# Patient Record
Sex: FEMALE | Race: WHITE | Employment: FULL TIME | URBAN - METROPOLITAN AREA
[De-identification: names, ages, dates, MRNs, and addresses within clinical notes are randomized per-mention and may not be internally consistent; named-entity substitution may affect disease eponyms.]

---

## 2017-01-17 ENCOUNTER — OFFICE VISIT (OUTPATIENT)
Dept: ENDOCRINOLOGY | Age: 37
End: 2017-01-17

## 2017-01-17 VITALS
HEIGHT: 62 IN | HEART RATE: 106 BPM | WEIGHT: 293 LBS | DIASTOLIC BLOOD PRESSURE: 99 MMHG | SYSTOLIC BLOOD PRESSURE: 147 MMHG | BODY MASS INDEX: 53.92 KG/M2

## 2017-01-17 DIAGNOSIS — E11.9 TYPE 2 DIABETES MELLITUS WITHOUT COMPLICATION, WITH LONG-TERM CURRENT USE OF INSULIN (HCC): Primary | ICD-10-CM

## 2017-01-17 DIAGNOSIS — Z79.4 TYPE 2 DIABETES MELLITUS WITHOUT COMPLICATION, WITH LONG-TERM CURRENT USE OF INSULIN (HCC): Primary | ICD-10-CM

## 2017-01-17 DIAGNOSIS — I10 ESSENTIAL HYPERTENSION: ICD-10-CM

## 2017-01-17 RX ORDER — BLOOD SUGAR DIAGNOSTIC
STRIP MISCELLANEOUS
Qty: 100 STRIP | Refills: 10 | Status: SHIPPED | OUTPATIENT
Start: 2017-01-17 | End: 2019-01-30 | Stop reason: ALTCHOICE

## 2017-01-17 RX ORDER — PEN NEEDLE, DIABETIC 31 GX3/16"
NEEDLE, DISPOSABLE MISCELLANEOUS
Qty: 150 PEN NEEDLE | Refills: 11 | Status: SHIPPED | OUTPATIENT
Start: 2017-01-17

## 2017-01-17 NOTE — PATIENT INSTRUCTIONS
Diabetes type II  - Work on diet. Avoid sugared beverages and lower carbohydrate intake     Lantus 100 units daily (two 50 unit injections) -   - Take EVERY day    Novolog -   Continue current dosing for meals    Monitor glucoses before meals and at bedtime to help determine insulin adjustments  Goals for blood sugars:  Fasting  (less than 150)  Other times -  (less than 180).     Blood pressure:  Continue losartan 50 mg twice daily

## 2017-01-17 NOTE — MR AVS SNAPSHOT
Visit Information Date & Time Provider Department Dept. Phone Encounter #  
 1/17/2017 11:50 AM Sandra Juarez, Deborah [facility] Diabetes and Endocrinology [id number] Upcoming Health Maintenance Date Due Pneumococcal 19-64 Medium Risk (1 of 1 - PPSV23) 1/11/2001 DTaP/Tdap/Td series (1 - Tdap) 1/11/2003 PAP AKA CERVICAL CYTOLOGY 1/11/2003 HEMOGLOBIN A1C Q6M 9/16/2015 MICROALBUMIN Q1 3/16/2016 LIPID PANEL Q1 3/16/2016 EYE EXAM RETINAL OR DILATED Q1 5/12/2016 FOOT EXAM Q1 6/23/2016 INFLUENZA AGE 9 TO ADULT 8/1/2016 Allergies as of 1/17/2017  Review Complete On: 1/17/2017 By: Sandra Juarez MD  
  
 Severity Noted Reaction Type Reactions Celexa [Citalopram]  06/25/2012    Anxiety Current Immunizations  Never Reviewed No immunizations on file. Not reviewed this visit You Were Diagnosed With   
  
 Codes Comments Type 2 diabetes mellitus without complication, with long-term current use of insulin (HCC)    -  Primary ICD-10-CM: E11.9, Z79.4 ICD-9-CM: 250.00, V58.67 BMI 50.0-59.9, adult [facility])     ICD-10-CM: H66.07 
ICD-9-CM: V85.43 Essential hypertension     ICD-10-CM: I10 
ICD-9-CM: 401.9 Vitals BP Pulse Height(growth percentile) Weight(growth percentile) BMI OB Status (!) 147/99 (!) 106 5' 2\" (1.575 m) 302 lb (137 kg) 55.24 kg/m2 Having regular periods Smoking Status Never Smoker Vitals History BMI and BSA Data Body Mass Index Body Surface Area  
 55.24 kg/m 2 2.45 m 2 Preferred Pharmacy Pharmacy Name Phone [id number]- [place], [place] - [address] [phone] Your Updated Medication List  
  
   
This list is accurate as of: 1/17/17 12:54 PM.  Always use your most recent med list.  
  
  
  
  
 * Blood-Glucose Meter monitoring kit Commonly known as:  CommonBond IQ METER As directed * Blood-Glucose Meter Misc Commonly known as:  [address]  
1 Each by Does Not Apply route three (3) times daily. * glucose blood VI test strips strip Commonly known as:  ONETOUCH VERIO  
by Does Not Apply route five (5) times daily. * glucose blood VI test strips strip Commonly known as:  Edith Kidney Monitor blood glucose up to 3 times daily. insulin aspart 100 unit/mL Inpn Commonly known as:  Katerin Barnacle Inject 30-50 units with each meal and for high blood glucoses-up to 150 units/day. Follow-up needed for refills. Last visit > 1 year ago. Insulin Needles (Disposable) 32 gauge x 5/32\" Ndle Commonly known as:  Trina Pen Needle 5 times daily Insulin Syringe-Needle U-100 1 mL 31 gauge x 5/16 Syrg Commonly known as:  BD INSULIN SYRINGE ULT-FINE II  
1 Each by Does Not Apply route three (3) times daily. Lancets Misc Commonly known as: One Touch Delica  
by Does Not Apply route three (3) times daily. LANTUS SOLOSTAR 100 unit/mL (3 mL) pen Generic drug:  insulin glargine INJECT 100 UNITS (TWO 50 UNIT INJECTIONS) BY SUBCUTANEOUS ROUTE DAILY AS DIRECTED  
  
 losartan 50 mg tablet Commonly known as:  COZAAR Take 1 Tab by mouth two (2) times a day. Last office visit in 6/2015. Please call to schedule follow-up * Notice: This list has 4 medication(s) that are the same as other medications prescribed for you. Read the directions carefully, and ask your doctor or other care provider to review them with you. We Performed the Following HEMOGLOBIN A1C WITH EAG [81224 CPT(R)] LIPID PANEL [66847 CPT(R)] METABOLIC PANEL, COMPREHENSIVE [90620 CPT(R)] MICROALBUMIN, UR, RAND W/ MICROALBUMIN/CREA RATIO [id number] CPT(R)] TSH 3RD GENERATION [64228 CPT(R)] Patient Instructions Diabetes type II 
- Work on diet. Avoid sugared beverages and lower carbohydrate intake Lantus 100 units daily (two 50 unit injections) -  
- Take EVERY day Novolog -  
Continue current dosing for meals Monitor glucoses before meals and at bedtime to help determine insulin adjustments Goals for blood sugars: 
Fasting  (less than 150) Other times -  (less than 180). Blood pressure: 
Continue losartan 50 mg twice daily Introducing [facility]! Dear Georges Perez: Thank you for requesting a [employer] account. Our records indicate that you already have an active D[employer] account. You can access your account anytime at [email or web address] Did you know that you can access your hospital and ER discharge instructions at any time in [employer]pinion? You can also review all of your test results from your hospital stay or ER visit. Additional Information If you have questions, please visit the Frequently Asked Questions section of the [employer] website at [email or web address] Remember, D[employer]pinion is NOT to be used for urgent needs. For medical emergencies, dial 911. Now available from your iPhone and Android! Please provide this summary of care documentation to your next provider. Your primary care clinician is listed as Manuelito Vigil. If you have any questions after today's visit, please call [phone].

## 2017-01-17 NOTE — PROGRESS NOTES
History of Present Illness: Robert Willis is a 30 y.o. female presents for follow-up of diabetes. Last visit was in 3/2015    Current diabetes regimen:  Lantus 100 units daily. Misses doses often - about 1/4 to 1/2 of the time. Novolo-60 units with meals - typically after meal    Glucoses:   Checking infrequently - 1-5 times a month. Lowest 175. Most in 300s  Denies sx of lows. Diet:   Breakfast skips typically or eggs, arriaga, sausage. Coffee + sugar - 30 units Novolog  Lunch: Fast food - may get bowl from [employer].  sweet tea or soda. May take up to 60units. Dinner: meat and 1 carb. HTN: losartan- 50 mg at bedtime    Obesity - wt stable and has been very stable over last few years  Menses are regular. Not currently using birth control    Past Medical History   Diagnosis Date    [facility] 50.[facility]-59.[facility]    Diabetes mellitus type II      Current Outpatient Prescriptions   Medication Sig    insulin aspart (NOVOLOG FLEXPEN) 100 unit/mL inpn Inject 30-50 units with each meal and for high blood glucoses-up to 150 units/day. Follow-up needed for refills. Last visit > 1 year ago.  losartan (COZAAR) 50 mg tablet Take 1 Tab by mouth two (2) times a day. Last office visit in 2015. Please call to schedule follow-up    HYDROcodone-acetaminophen (NORCO) 5-325 mg per tablet Take 1-2 Tabs by mouth every four (4) hours as needed for Pain. Max Daily Amount: 12 Tabs.  LANTUS SOLOSTAR 100 unit/mL (3 mL) pen INJECT 100 UNITS (TWO 50 UNIT INJECTIONS) BY SUBCUTANEOUS ROUTE DAILY AS DIRECTED    glucose blood VI test strips (ONETOUCH VERIO) strip Monitor blood glucose up to 3 times daily.  Blood-Glucose Meter (ONETOUCH VERIO SYSTEM) misc 1 Each by Does Not Apply route three (3) times daily.  Insulin Needles, Disposable, (PAMELLA PEN NEEDLE) 32 x 5/32 \" ndle 5 times daily    glucose blood VI test strips (ONE TOUCH VERIO) strip by Does Not Apply route five (5) times daily.     Insulin Syringe-Needle U-100 (BD INSULIN SYRINGE ULT-FINE II) 1 mL 31 x 5/16\" Syrg 1 Each by Does Not Apply route three (3) times daily.  Blood-Glucose Meter (ONE TOUCH VERIO IQ) monitoring kit As directed    Lancets (ONE TOUCH DELICA) Misc by Does Not Apply route three (3) times daily.  oxyCODONE-acetaminophen (PERCOCET) 5-325 mg per tablet Take 1 Tab by mouth every four (4) hours as needed for Pain. Max Daily Amount: 6 Tabs.  dextroamphetamine-amphetamine (ADDERALL) 10 mg tablet Take 10 mg by mouth. No current facility-administered medications for this visit. Allergies   Allergen Reactions    Celexa [Citalopram] Anxiety       Review of Systems:  - Eyes: due for exam.   - Cardiovascular: no chest pain  - Respiratory: no shortness of breath  - Musculoskeletal: no myalgias  - Neurological: no numbness/tingling in extremities    Physical Examination:  Visit Vitals    BP (!) 147/99    Pulse (!) 106    Ht 5' 2\" (1.575 m)    Wt 302 lb (137 kg)    BMI 55.24 kg/m2   -   - General: pleasant, no distress, normal gait   HEENT: hearing intact, EOMI, clear sclera without icterus  - Cardiovascular: regular, normal rate   - Respiratory: normal effort  - Integumentary: no edema. Diabetic foot exam:     Left: Filament test normal sensation with micro filament   Pulse DP: 2+ (normal)   Deformities: None    - Psychiatric: normal mood and affect    Data Reviewed:   Component      Latest Ref Rng & Units 3/16/2015 3/16/2015 3/16/2015           1:57 PM  1:57 PM  1:57 PM   Cholesterol, total      100 - 199 mg/dL 197     Triglyceride      0 - 149 mg/dL 83     HDL Cholesterol      >39 mg/dL 59     VLDL, calculated      5 - 40 mg/dL 17     LDL, calculated      0 - 99 mg/dL 121 (H)     Creatinine, urine      16.0 - 327.0 mg/dL   157.9   Microalbumin, urine      0.0 - 17.0 ug/mL   45.1 (H)   Microalbumin/Creat.  Ratio      0.0 - 30.0 mg/g creat   28.6   Hemoglobin A1c, (calculated)      4.8 - 5.6 %  10.7 (H) Assessment/Plan:   1. Type 2 diabetes mellitus without complication, with long-term current use of insulin (HCC)   - uncontrolled. - did not tolerate metformin in past  - Needs to take Lantus 100 units EVERY day. Recommend she continue novolog 20 - 60 units with meals  - glucose monitoring is needed three times daily to help us determine further adjustments needed for diabetes and    2. BMI 50.0-59.9, adult ([facility]   - encouraged dietary and exercise. Improving this would greatly help diabetes control and overall longer-term success. 3. Essential hypertension  - she is not taking losartan today. Resume and take twice daily. Greater than 50% of 25 minute visit was spent counseling the patient about above. Patient Instructions   Diabetes type II  - Work on diet. Avoid sugared beverages and lower carbohydrate intake     Lantus 100 units daily (two 50 unit injections) -   - Take EVERY day    Novolog -   Continue current dosing for meals    Monitor glucoses before meals and at bedtime to help determine insulin adjustments  Goals for blood sugars:  Fasting  (less than 150)  Other times -  (less than 180). Blood pressure:  Continue losartan 50 mg twice daily      Follow-up Disposition:  Return in about 2 months (around 3/17/2017).     Copy sent to:

## 2017-01-26 LAB
ALBUMIN SERPL-MCNC: 3.9 G/DL (ref 3.5–5.5)
ALBUMIN/CREAT UR: 34.8 MG/G CREAT (ref 0–30)
ALBUMIN/GLOB SERPL: 1.3 {RATIO} (ref 1.1–2.5)
ALP SERPL-CCNC: 100 IU/L (ref 39–117)
ALT SERPL-CCNC: 31 IU/L (ref 0–32)
AST SERPL-CCNC: 12 IU/L (ref 0–40)
BILIRUB SERPL-MCNC: 0.3 MG/DL (ref 0–1.2)
BUN SERPL-MCNC: 15 MG/DL (ref 6–20)
BUN/CREAT SERPL: 23 (ref 8–20)
CALCIUM SERPL-MCNC: 9 MG/DL (ref 8.7–10.2)
CHLORIDE SERPL-SCNC: 95 MMOL/L (ref 96–106)
CHOLEST SERPL-MCNC: 175 MG/DL (ref 100–199)
CO2 SERPL-SCNC: 24 MMOL/L (ref 18–29)
CREAT SERPL-MCNC: 0.66 MG/DL (ref 0.57–1)
CREAT UR-MCNC: 75.6 MG/DL
EST. AVERAGE GLUCOSE BLD GHB EST-MCNC: 292 MG/DL
GLOBULIN SER CALC-MCNC: 3 G/DL (ref 1.5–4.5)
GLUCOSE SERPL-MCNC: 352 MG/DL (ref 65–99)
HBA1C MFR BLD: 11.8 % (ref 4.8–5.6)
HDLC SERPL-MCNC: 63 MG/DL
LDLC SERPL CALC-MCNC: 97 MG/DL (ref 0–99)
MICROALBUMIN UR-MCNC: 26.3 UG/ML
POTASSIUM SERPL-SCNC: 4.5 MMOL/L (ref 3.5–5.2)
PROT SERPL-MCNC: 6.9 G/DL (ref 6–8.5)
SODIUM SERPL-SCNC: 135 MMOL/L (ref 134–144)
TRIGL SERPL-MCNC: 77 MG/DL (ref 0–149)
TSH SERPL DL<=0.005 MIU/L-ACNC: 1.4 UIU/ML (ref 0.45–4.5)
VLDLC SERPL CALC-MCNC: 15 MG/DL (ref 5–40)

## 2017-02-21 ENCOUNTER — HOSPITAL ENCOUNTER (OUTPATIENT)
Dept: DIABETES SERVICES | Age: 37
Discharge: HOME | End: 2017-02-21
Payer: COMMERCIAL

## 2017-02-21 DIAGNOSIS — E11.9 TYPE 2 DIABETES MELLITUS WITHOUT COMPLICATION, WITH LONG-TERM CURRENT USE OF INSULIN (HCC): Primary | ICD-10-CM

## 2017-02-21 DIAGNOSIS — Z79.4 TYPE 2 DIABETES MELLITUS WITHOUT COMPLICATION, WITH LONG-TERM CURRENT USE OF INSULIN (HCC): Primary | ICD-10-CM

## 2017-02-21 PROCEDURE — G0109 DIAB MANAGE TRN IND/GROUP: HCPCS | Performed by: DIETITIAN, REGISTERED

## 2017-02-24 NOTE — DIABETES MGMT
2/21/2017        Dear Magdiel Mccabe MD,    Thank you for your  kind referral.  Your patient, Ayad Kelley, attended  Session #1 at [place] [address] where the following topics were covered today:     *Describing diabetes disease process and treatment options    *Incorporating nutrition management into their lifestyle    *Monitoring blood glucose and other parameters and interpreting and using the results for self         management decision making    *Preventing, detecting and treating acute complications    *Incorporating physical activity into lifestyle   *Using medications safely and for maximum therapeutic effectiveness    * Developing personal strategies to promote health and behavior change    *Developing personal strategies to address psychosocial issues and concerns    Data from first visit:  Weight: 2/21/2017 305.4 #  HgbA1c:  1/25/17    11.8 %    Increased risk for diabetes: 5.7-6.4%, Diabetes >6.4%  Glycemic control for adults with diabetes:  < 7%         Elderly or multiple medical conditions  <8%    Random blood glucose: 2/21/2017 Post-Meal 203 mg/dl  Meter given: AccuChek Connect  Goal(s) set : Goal 1: eat 2 veggie servings 5 times a week    Your patient will have two (2) additional appointments to complete the ordered education. Their next visit is scheduled for 3/7/17    We look forward to assisting your patient in meeting their self-management goals.   If you have any questions, please do not hesitate to call the [facility] at [phone]    Sincerely,  Arnaud Gaines RD, [address] [facility] [address]  [place], [address]  Phone: [phone] Fax: [phone]

## 2017-03-07 ENCOUNTER — HOSPITAL ENCOUNTER (OUTPATIENT)
Dept: DIABETES SERVICES | Age: 37
Discharge: HOME | End: 2017-03-07
Payer: COMMERCIAL

## 2017-03-07 DIAGNOSIS — E11.9 TYPE 2 DIABETES MELLITUS WITHOUT COMPLICATION, UNSPECIFIED LONG TERM INSULIN USE STATUS: ICD-10-CM

## 2017-03-07 PROCEDURE — G0109 DIAB MANAGE TRN IND/GROUP: HCPCS | Performed by: DIETITIAN, REGISTERED

## 2017-03-08 NOTE — DIABETES MGMT
03/07/17     Thank you for your kind referral. Your patient Shellie Esteban attended Session #2 at [place] [address] where the following topics were covered today. * Incorporating physical activity into lifestyle  * Incorporating nutrition management into lifestyle  * Preventing , detecting and treating acute complications  * Preventing , detecting and treating chronic complications     Your patient will have one( 1) additional appointment to complete the ordered education. Their next visit is scheduled for 4/18/17      We look forward to assisting your patient in meeting their self- management goals.  If you have any questions, please do not hesitate to call the [facility] at [phone]    Sincerely,     Meme Nuñez, [address] [place] [address] , E    [address] [facility], [facility] [address]  [place]  Phone: [phone] Fax : [phone]

## 2017-03-14 RX ORDER — INSULIN ASPART 100 [IU]/ML
INJECTION, SOLUTION INTRAVENOUS; SUBCUTANEOUS
Qty: 45 ML | Refills: 5 | Status: SHIPPED | OUTPATIENT
Start: 2017-03-14 | End: 2017-04-24 | Stop reason: CLARIF

## 2017-04-11 RX ORDER — INSULIN GLARGINE 100 [IU]/ML
INJECTION, SOLUTION SUBCUTANEOUS
Qty: 30 ML | Refills: 0 | Status: SHIPPED | OUTPATIENT
Start: 2017-04-11 | End: 2017-08-14 | Stop reason: SDUPTHER

## 2017-04-24 RX ORDER — INSULIN LISPRO 100 [IU]/ML
INJECTION, SOLUTION INTRAVENOUS; SUBCUTANEOUS
Qty: 45 ML | Refills: 5 | Status: SHIPPED | OUTPATIENT
Start: 2017-04-24 | End: 2018-04-30 | Stop reason: CLARIF

## 2017-04-28 ENCOUNTER — TELEPHONE (OUTPATIENT)
Dept: DIABETES SERVICES | Age: 37
End: 2017-04-28

## 2018-06-13 RX ORDER — INSULIN GLARGINE 100 [IU]/ML
INJECTION, SOLUTION SUBCUTANEOUS
Qty: 90 ML | Refills: 0 | Status: SHIPPED | OUTPATIENT
Start: 2018-06-13 | End: 2018-10-23 | Stop reason: SDUPTHER

## 2019-01-10 DIAGNOSIS — E11.9 DIABETES MELLITUS WITHOUT COMPLICATION (HCC): Primary | ICD-10-CM

## 2019-01-10 RX ORDER — INSULIN ASPART 100 [IU]/ML
INJECTION, SOLUTION INTRAVENOUS; SUBCUTANEOUS
Qty: 45 ML | Refills: 0 | Status: SHIPPED | OUTPATIENT
Start: 2019-01-10 | End: 2019-02-18 | Stop reason: SDUPTHER

## 2019-01-10 NOTE — TELEPHONE ENCOUNTER
Called pt mobile phone. Left message in regards to making a f/u appointment since pt hasn't been seen since 1/2017.

## 2019-01-30 ENCOUNTER — OFFICE VISIT (OUTPATIENT)
Dept: ENDOCRINOLOGY | Age: 39
End: 2019-01-30

## 2019-01-30 VITALS
HEIGHT: 62 IN | SYSTOLIC BLOOD PRESSURE: 157 MMHG | DIASTOLIC BLOOD PRESSURE: 92 MMHG | WEIGHT: 293 LBS | BODY MASS INDEX: 53.92 KG/M2 | HEART RATE: 88 BPM

## 2019-01-30 DIAGNOSIS — E11.9 DIABETES MELLITUS WITHOUT COMPLICATION (HCC): Primary | ICD-10-CM

## 2019-01-30 DIAGNOSIS — I10 HTN (HYPERTENSION), BENIGN: ICD-10-CM

## 2019-01-30 LAB — HBA1C MFR BLD HPLC: 10.8 %

## 2019-01-30 RX ORDER — LOSARTAN POTASSIUM 50 MG/1
50 TABLET ORAL DAILY
Qty: 90 TAB | Refills: 3 | Status: SHIPPED | OUTPATIENT
Start: 2019-01-30 | End: 2019-01-30 | Stop reason: SDUPTHER

## 2019-01-30 RX ORDER — BLOOD-GLUCOSE METER
EACH MISCELLANEOUS
COMMUNITY

## 2019-01-30 RX ORDER — LOSARTAN POTASSIUM 50 MG/1
50 TABLET ORAL 2 TIMES DAILY
Qty: 180 TAB | Refills: 3 | Status: SHIPPED | OUTPATIENT
Start: 2019-01-30 | End: 2021-06-05 | Stop reason: SDUPTHER

## 2019-01-30 NOTE — PATIENT INSTRUCTIONS
Diabetes type II  - Work on diet. Avoid sugared beverages and lower carbohydrate intake      Lantus 100 units daily (two 50 unit injections) -   - Take EVERY day  - follow glucose trend from bedtime to fasting to see how this is working     Novolog -   Continue current dosing for meals  Follow glucose trend from before meal to 4 hours later to evaluate    Trulicity 2.16 mg weekly. This should help with glucose control around meals and lower your Novolog needs  When you add this, would take 1/2 typical Novolog dose.       Monitor glucoses before meals and at bedtime to help determine insulin adjustments  Goals for blood sugars:  Fasting  (less than 150)  Other times -  (less than 180).      Blood pressure:  Continue losartan 50 mg twice daily

## 2019-01-30 NOTE — PROGRESS NOTES
History of Present Illness: Kai Goncalves is a 42 y.o. female presents for follow-up of diabetes. Last visit was approximately 2 years ago in early 2017.      Current diabetes regimen:  Lantus 100 units daily. Novolo-40 units with meals - typically after meal    Did not tolerate metformin  Previously took Victoza, but had nausea. Bydureon - had pruritis at injection sites.      Glucoses:   170 this AM.  Not previously monitoring  Has had polyuria and some yeast infections which she suspects are due to uncontrolled diabetes.      Diet:   Admits to drinking sugared beverages.      HTN: not taking losartan due to concerns regarding recall.      BMI 54- increased. Social:      Past Medical History:   Diagnosis Date    BMI 50.0-59.9, adult ([facility] 2016    Diabetes mellitus type II      Current Outpatient Medications   Medication Sig    Blood-Glucose Meter (ACCU-CHEK SHOSHANA CONNECT METER) misc by Does Not Apply route. Checks Blood sugar once a month    glucose blood VI test strips (ACCU-CHEK SHOSHANA PLUS TEST STRP) strip by Does Not Apply route See Admin Instructions.  insulin aspart U-100 (NOVOLOG FLEXPEN U-100 INSULIN) 100 unit/mL inpn INJECT 30-50 UNITS WITH EACH MEAL AND FOR HIGH BLOOD GLUCOSES - UP  UNITS PER DAY    insulin glargine (LANTUS SOLOSTAR U-100 INSULIN) 100 unit/mL (3 mL) inpn INJECT 100 UNITS BY SUBCUTANEOUS ROUTE DAILY. FOLLOW-UP NEEDED. LAST VISIT IN 2017. No further refills without follow-up.  Insulin Needles, Disposable, (PAMELLA PEN NEEDLE) 32 gauge x 5/32\" ndle 4 times daily    Insulin Needles, Disposable, (PAMELLA PEN NEEDLE) 32 x 5/32 \" ndle 5 times daily    Insulin Syringe-Needle U-100 (BD INSULIN SYRINGE ULT-FINE II) 1 mL 31 x 5/16\" Syrg 1 Each by Does Not Apply route three (3) times daily.  Lancets (ONE TOUCH DELICA) Misc by Does Not Apply route three (3) times daily.  losartan (COZAAR) 50 mg tablet Take 1 Tab by mouth two (2) times a day.     ONETOUCH VERIO strip Monitor blood glucose 3 times daily.  Blood-Glucose Meter (ONETOUCH VERIO SYSTEM) misc 1 Each by Does Not Apply route three (3) times daily.  Blood-Glucose Meter (ONE TOUCH VERIO IQ) monitoring kit As directed     No current facility-administered medications for this visit.       Allergies   Allergen Reactions    Celexa [Citalopram] Anxiety     Review of Systems:  - Eyes: no blurry vision or double vision  - Cardiovascular: no chest pain  - Respiratory: no shortness of breath  - Musculoskeletal: no myalgias  - Neurological: no numbness/tingling in extremities    Physical Examination:  Visit Vitals  BP (!) 157/92 (BP 1 Location: Left arm, BP Patient Position: Sitting)   Pulse 88   Ht 5' 2\" (1.575 m)   Wt 297 lb 6.4 oz (134.9 kg)   BMI 54.40 kg/m²   -   - General: pleasant, no distress, normal gait   HEENT: hearing intact, EOMI, clear sclera without icterus  - Cardiovascular: regular, normal rate   - Respiratory: normal effort  - Integumentary: no edema   Diabetic foot exam:     Right Foot:   Visual Exam: normal    Pulse DP: 2+ (normal)   Filament test: normal sensation        - Psychiatric: normal mood and affect    Data Reviewed:   Component      Latest Ref Rng & Units 1/30/2019 1/25/2017 1/25/2017 1/25/2017           2:19 PM  9:52 AM  9:52 AM  9:52 AM   Glucose      65 - 99 mg/dL  352 (H)     BUN      6 - 20 mg/dL  15     Creatinine      0.57 - 1.00 mg/dL  0.66     GFR est non-AA      >59 mL/min/1.73  115     GFR est AA      >59 mL/min/1.73  132     BUN/Creatinine ratio      8 - 20  23 (H)     Sodium      134 - 144 mmol/L  135     Potassium      3.5 - 5.2 mmol/L  4.5     Chloride      96 - 106 mmol/L  95 (L)     CO2      18 - 29 mmol/L  24     Calcium      8.7 - 10.2 mg/dL  9.0     Protein, total      6.0 - 8.5 g/dL  6.9     Albumin      3.5 - 5.5 g/dL  3.9     GLOBULIN, TOTAL      1.5 - 4.5 g/dL  3.0     A-G Ratio      1.1 - 2.5  1.3     Bilirubin, total      0.0 - 1.2 mg/dL  0.3 Alk. phosphatase      39 - 117 IU/L  100     AST      0 - 40 IU/L  12     ALT (SGPT)      0 - 32 IU/L  31     Cholesterol, total      100 - 199 mg/dL       Triglyceride      0 - 149 mg/dL       HDL Cholesterol      >39 mg/dL       VLDL, calculated      5 - 40 mg/dL       LDL, calculated      0 - 99 mg/dL       Creatinine, urine      Not Estab. mg/dL   75.6    Microalbumin, urine      Not Estab. ug/mL   26.3    Microalbumin/Creat. Ratio      0.0 - 30.0 mg/g creat   34.8 (H)    Hemoglobin A1c, (calculated)      4.8 - 5.6 %    11.8 (H)   Estimated average glucose      mg/dL    292   Hemoglobin A1c (POC)      % 10.8        Component      Latest Ref Rng & Units 1/25/2017           9:52 AM   Glucose      65 - 99 mg/dL    BUN      6 - 20 mg/dL    Creatinine      0.57 - 1.00 mg/dL    GFR est non-AA      >59 mL/min/1.73    GFR est AA      >59 mL/min/1.73    BUN/Creatinine ratio      8 - 20    Sodium      134 - 144 mmol/L    Potassium      3.5 - 5.2 mmol/L    Chloride      96 - 106 mmol/L    CO2      18 - 29 mmol/L    Calcium      8.7 - 10.2 mg/dL    Protein, total      6.0 - 8.5 g/dL    Albumin      3.5 - 5.5 g/dL    GLOBULIN, TOTAL      1.5 - 4.5 g/dL    A-G Ratio      1.1 - 2.5    Bilirubin, total      0.0 - 1.2 mg/dL    Alk. phosphatase      39 - 117 IU/L    AST      0 - 40 IU/L    ALT (SGPT)      0 - 32 IU/L    Cholesterol, total      100 - 199 mg/dL 175   Triglyceride      0 - 149 mg/dL 77   HDL Cholesterol      >39 mg/dL 63   VLDL, calculated      5 - 40 mg/dL 15   LDL, calculated      0 - 99 mg/dL 97       Assessment/Plan:   1. [facility] without [facility] ([facility]   - uncontrolled  - Will check c-peptide to see if she would benefit from non-insulin therapies  - trial of Trulicity 0.38 mg once weekly. Hopefully this will be covered and she will tolerate.  If covered, advised her to decrease novolog dose in 1/2.   - continue Lantus 100 units -follow glucose trend overnight  - continue Novolog with meals  - avoid added sugars   2. BMI 50.0-59.9, adult ([facility] [address]   - will try to decrease insulin needs and doses  - adding GLP-1 will help. If glucoses improve, we can add SGLT2 inhibitor     3. HTN (hypertension), benign   - high today  - resumed losartan 50 mg BID. Patient Instructions   Diabetes type II  - Work on diet. Avoid sugared beverages and lower carbohydrate intake      Lantus 100 units daily (two 50 unit injections) -   - Take EVERY day  - follow glucose trend from bedtime to fasting to see how this is working     Novolog -   Continue current dosing for meals  Follow glucose trend from before meal to 4 hours later to evaluate    Trulicity 5.56 mg weekly. This should help with glucose control around meals and lower your Novolog needs  When you add this, would take 1/2 typical Novolog dose.       Monitor glucoses before meals and at bedtime to help determine insulin adjustments  Goals for blood sugars:  Fasting  (less than 150)  Other times -  (less than 180).    Blood pressure:  Continue losartan 50 mg twice daily    Follow-up Disposition:  Return in about 3 months (around 4/30/2019).     Copy sent to:

## 2019-01-31 LAB
ALBUMIN SERPL-MCNC: 4.2 G/DL (ref 3.5–5.5)
ALBUMIN/CREAT UR: 425.8 MG/G CREAT (ref 0–30)
ALBUMIN/GLOB SERPL: 1.3 {RATIO} (ref 1.2–2.2)
ALP SERPL-CCNC: 107 IU/L (ref 39–117)
ALT SERPL-CCNC: 33 IU/L (ref 0–32)
AST SERPL-CCNC: 12 IU/L (ref 0–40)
BILIRUB SERPL-MCNC: 0.2 MG/DL (ref 0–1.2)
BUN SERPL-MCNC: 10 MG/DL (ref 6–20)
BUN/CREAT SERPL: 14 (ref 9–23)
C PEPTIDE SERPL-MCNC: 1.1 NG/ML (ref 1.1–4.4)
CALCIUM SERPL-MCNC: 9.4 MG/DL (ref 8.7–10.2)
CHLORIDE SERPL-SCNC: 99 MMOL/L (ref 96–106)
CHOLEST SERPL-MCNC: 188 MG/DL (ref 100–199)
CO2 SERPL-SCNC: 25 MMOL/L (ref 20–29)
CREAT SERPL-MCNC: 0.7 MG/DL (ref 0.57–1)
CREAT UR-MCNC: 180.7 MG/DL
GLOBULIN SER CALC-MCNC: 3.3 G/DL (ref 1.5–4.5)
GLUCOSE SERPL-MCNC: 169 MG/DL (ref 65–99)
HDLC SERPL-MCNC: 61 MG/DL
INTERPRETATION, 910389: NORMAL
LDLC SERPL CALC-MCNC: 111 MG/DL (ref 0–99)
Lab: NORMAL
MICROALBUMIN UR-MCNC: 769.5 UG/ML
POTASSIUM SERPL-SCNC: 3.9 MMOL/L (ref 3.5–5.2)
PROT SERPL-MCNC: 7.5 G/DL (ref 6–8.5)
SODIUM SERPL-SCNC: 140 MMOL/L (ref 134–144)
TRIGL SERPL-MCNC: 81 MG/DL (ref 0–149)
VLDLC SERPL CALC-MCNC: 16 MG/DL (ref 5–40)

## 2019-02-01 ENCOUNTER — DOCUMENTATION ONLY (OUTPATIENT)
Dept: ENDOCRINOLOGY | Age: 39
End: 2019-02-01

## 2019-02-01 NOTE — PROGRESS NOTES
Started PA for Trulicity through [facility] .([id number])  Awaiting response.

## 2019-02-04 ENCOUNTER — DOCUMENTATION ONLY (OUTPATIENT)
Dept: ENDOCRINOLOGY | Age: 39
End: 2019-02-04

## 2019-04-24 ENCOUNTER — OFFICE VISIT (OUTPATIENT)
Dept: ENDOCRINOLOGY | Age: 39
End: 2019-04-24

## 2019-04-24 VITALS
DIASTOLIC BLOOD PRESSURE: 83 MMHG | HEART RATE: 110 BPM | BODY MASS INDEX: 53.37 KG/M2 | WEIGHT: 290 LBS | SYSTOLIC BLOOD PRESSURE: 129 MMHG | HEIGHT: 62 IN

## 2019-04-24 DIAGNOSIS — I10 ESSENTIAL HYPERTENSION: ICD-10-CM

## 2019-04-24 DIAGNOSIS — R80.9 TYPE 2 DIABETES MELLITUS WITH MICROALBUMINURIA, WITH LONG-TERM CURRENT USE OF INSULIN (HCC): Primary | ICD-10-CM

## 2019-04-24 DIAGNOSIS — E11.29 TYPE 2 DIABETES MELLITUS WITH MICROALBUMINURIA, WITH LONG-TERM CURRENT USE OF INSULIN (HCC): Primary | ICD-10-CM

## 2019-04-24 DIAGNOSIS — Z79.4 TYPE 2 DIABETES MELLITUS WITH MICROALBUMINURIA, WITH LONG-TERM CURRENT USE OF INSULIN (HCC): Primary | ICD-10-CM

## 2019-04-24 PROBLEM — E11.21 TYPE 2 DIABETES WITH NEPHROPATHY (HCC): Status: ACTIVE | Noted: 2019-04-24

## 2019-04-24 LAB — HBA1C MFR BLD HPLC: 9.5 %

## 2019-04-24 RX ORDER — OMEPRAZOLE 40 MG/1
20 CAPSULE, DELAYED RELEASE ORAL DAILY
Refills: 4 | COMMUNITY
Start: 2019-04-08 | End: 2022-08-17

## 2019-04-24 NOTE — PATIENT INSTRUCTIONS
Diabetes type II  - Work on diet. Avoid sugared beverages and lower carbohydrate intake      Continue Trulicity    Lantus 363 units daily (two 50 unit injections) -   - Take EVERY day  - follow glucose trend from bedtime to fasting to see how this is working     Novolog -   Continue current dosing for meals  Follow glucose trend from before meal to 4 hours later to evaluate    Monitor glucoses before meals and at bedtime to help determine insulin adjustments  Goals for blood sugars:  Fasting  (less than 150)  Other times -  (less than 180).      Blood pressure:  Continue losartan 50 mg twice daily

## 2019-04-24 NOTE — PROGRESS NOTES
History of Present Illness: Leelee Joseph is a 42 y.o. female presents for follow-up of diabetes. Current diabetes regimen:  Lantus 100 units daily. - does forget to take sometimes. Forgets 1-2 times/week. Novolo-40 units with meals if higher in carb intake. 20 units if glucoses are better. As low as 10 units. Trulicity 5.55 mg weekly. She is tolerating this and does feel like it may help her appetite. Did not tolerate metformin  Previously took Victoza, but had nausea. Bydureon - had pruritis at injection sites.      Glucoses:   No recent yeast infections. 203 is 90 day average  - checked about 33 times.      Diet:   Having less soda.  HTN: taking losartan again. 50 mg twice daily. Blood pressures improved  Reviewed her substantial microalbuminuria with her last labs.     BMI 53  Social:      Past Medical History:   Diagnosis Date    BMI 50.0-59.9, adult ([facility] [address] 2016    Diabetes mellitus type II      Current Outpatient Medications   Medication Sig    omeprazole (PRILOSEC) 40 mg capsule TAKE 1 CAPSULE BY MOUTH EVERY DAY    insulin aspart U-100 (NOVOLOG FLEXPEN U-100 INSULIN) 100 unit/mL inpn INJECT 30-50 UNITS WITH EACH MEAL AND FOR HIGH BLOOD GLUCOSES - UP  UNITS PER DAY    Blood-Glucose Meter (ACCU-CHEK SHOSHANA CONNECT METER) misc by Does Not Apply route. Checks Blood sugar once a month    glucose blood VI test strips (ACCU-CHEK SHOSHANA PLUS TEST STRP) strip by Does Not Apply route See Admin Instructions.  dulaglutide (TRULICITY) 7.72 NB/4.1 mL sub-q pen 0.5 mL by SubCUTAneous route every seven (7) days.  losartan (COZAAR) 50 mg tablet Take 1 Tab by mouth two (2) times a day.  insulin glargine (LANTUS SOLOSTAR U-100 INSULIN) 100 unit/mL (3 mL) inpn INJECT 100 UNITS BY SUBCUTANEOUS ROUTE DAILY. FOLLOW-UP NEEDED. LAST VISIT IN 2017. No further refills without follow-up.     Insulin Needles, Disposable, (PAMELLA PEN NEEDLE) 32 gauge x 5/32\" ndle 4 times daily    Insulin Needles, Disposable, (PAMELLA PEN NEEDLE) 32 x 5/32 \" ndle 5 times daily    Insulin Syringe-Needle U-100 (BD INSULIN SYRINGE ULT-FINE II) 1 mL 31 x 5/16\" Syrg 1 Each by Does Not Apply route three (3) times daily.  Lancets (ONE TOUCH DELICA) Misc by Does Not Apply route three (3) times daily. No current facility-administered medications for this visit. Allergies   Allergen Reactions    Celexa [Citalopram] Anxiety       Review of Systems:  - Eyes: no blurry vision or double vision  - Cardiovascular: no chest pain  - Respiratory: no shortness of breath  - Musculoskeletal: no myalgias  - Neurological: no numbness/tingling in extremities    Physical Examination:  Visit Vitals  /83 (BP 1 Location: Left arm, BP Patient Position: Sitting)   Pulse (!) 110   Ht 5' 2\" (1.575 m)   Wt 290 lb (131.5 kg)   BMI 53.04 kg/m²   -   - General: pleasant, no distress, normal gait   HEENT: hearing intact, EOMI, clear sclera without icterus  - Cardiovascular: regular, normal rate   - Respiratory: normal effort  - Integumentary: no edema  - Psychiatric: normal mood and affect    Data Reviewed:   No results found for: HBA1C, BAC3RPBM, EHV0TWDG   Lab Results   Component Value Date/Time    Sodium 140 01/30/2019 03:02 PM    Potassium 3.9 01/30/2019 03:02 PM    Creatinine 0.70 01/30/2019 03:02 PM    Microalb/Creat ratio (ug/mg creat.) 425.8 01/30/2019 03:02 PM        Lab Results   Component Value Date/Time    Cholesterol, total 188 01/30/2019 03:02 PM    HDL Cholesterol 61 01/30/2019 03:02 PM    LDL, calculated 111 01/30/2019 03:02 PM    Triglyceride 81 01/30/2019 03:02 PM      No results found for: TSH, FT4, TRALT, TMCLT, TSHEXT     Assessment/Plan:   1. Type 2 diabetes mellitus with microalbuminuria, with long-term current use of insulin (HCC)   Not controlled, but glucoses seem to be improving somewhat  The first of is to assure that she gets her Lantus dose every day.   Encouraged her to pare up Lantus with some other activity that she does every day, such as breast teeth, for example  Continue Trulicity. She seems to be responding well to this. Reviewed the importance of glucose monitoring. Monitor glucoses from bedtime to fasting help adjust Lantus dose  Monitor glucose before meals and 4 hours later help to adjust NovoLog dosing. With such information, I can better advise her for dose adjustments. Microhematuria: Reviewed the importance of diabetes and blood pressure control. We will reassess. 2. Essential hypertension   Improved. Continue losartan   3. BMI 50.0-59.9, adult ([address]   Encouraged smaller portions. Encouraged her to avoid liquid calories. Patient Instructions   Diabetes type II  - Work on diet. Avoid sugared beverages and lower carbohydrate intake      Continue Trulicity    Lantus 588 units daily (two 50 unit injections) -   - Take EVERY day  - follow glucose trend from bedtime to fasting to see how this is working     Novolog -   Continue current dosing for meals  Follow glucose trend from before meal to 4 hours later to evaluate    Monitor glucoses before meals and at bedtime to help determine insulin adjustments  Goals for blood sugars:  Fasting  (less than 150)  Other times -  (less than 180).    Blood pressure:  Continue losartan 50 mg twice daily    Follow-up and Dispositions    · Return in about 3 months (around 7/24/2019).            Copy sent to:

## 2019-04-25 LAB
ALBUMIN/CREAT UR: 95.8 MG/G CREAT (ref 0–30)
CREAT UR-MCNC: 338.6 MG/DL
MICROALBUMIN UR-MCNC: 324.5 UG/ML

## 2019-07-19 NOTE — TELEPHONE ENCOUNTER
7/19/2019  8:56 AM        Ms. Hailee Esteban called stating that she is completely out of herinsulin glargine (LANTUS SOLOSTAR U-100 INSULIN) 100 unit/mL (3 mL) inpn     . Please send to local pharmacy listed below. [facility] #[id number]- [address] - [address]    Thanks

## 2019-07-22 RX ORDER — INSULIN GLARGINE 100 [IU]/ML
INJECTION, SOLUTION SUBCUTANEOUS
Qty: 30 ML | Refills: 10 | Status: SHIPPED | OUTPATIENT
Start: 2019-07-22 | End: 2020-09-17 | Stop reason: SDUPTHER

## 2019-07-22 RX ORDER — INSULIN GLARGINE 100 [IU]/ML
INJECTION, SOLUTION SUBCUTANEOUS
Qty: 30 ML | Refills: 2 | OUTPATIENT
Start: 2019-07-22

## 2019-09-04 ENCOUNTER — OFFICE VISIT (OUTPATIENT)
Dept: ENDOCRINOLOGY | Age: 39
End: 2019-09-04

## 2019-09-04 VITALS
HEIGHT: 62 IN | DIASTOLIC BLOOD PRESSURE: 91 MMHG | HEART RATE: 94 BPM | WEIGHT: 288.4 LBS | SYSTOLIC BLOOD PRESSURE: 145 MMHG | BODY MASS INDEX: 53.07 KG/M2

## 2019-09-04 DIAGNOSIS — Z79.4 TYPE 2 DIABETES MELLITUS WITH MICROALBUMINURIA, WITH LONG-TERM CURRENT USE OF INSULIN (HCC): Primary | ICD-10-CM

## 2019-09-04 DIAGNOSIS — R80.9 TYPE 2 DIABETES MELLITUS WITH MICROALBUMINURIA, WITH LONG-TERM CURRENT USE OF INSULIN (HCC): Primary | ICD-10-CM

## 2019-09-04 DIAGNOSIS — E11.29 TYPE 2 DIABETES MELLITUS WITH MICROALBUMINURIA, WITH LONG-TERM CURRENT USE OF INSULIN (HCC): Primary | ICD-10-CM

## 2019-09-04 LAB — HBA1C MFR BLD HPLC: 9 %

## 2019-09-04 RX ORDER — FLASH GLUCOSE SENSOR
KIT MISCELLANEOUS
Qty: 2 KIT | Refills: 11 | Status: SHIPPED | OUTPATIENT
Start: 2019-09-04 | End: 2020-09-15 | Stop reason: SDUPTHER

## 2019-09-04 NOTE — PATIENT INSTRUCTIONS
Diabetes type II  - Work on diet. Avoid sugared beverages and lower carbohydrate intake      Continue Trulicity    Lantus 190 units daily (two 50 unit injections) -   - Take EVERY day  - follow glucose trend from bedtime to fasting to see how this is working     Novolog -   Take BEFORE you eat based on your glucose level prior to meal and anticipated carbohydrate/starch intake. Continue current dosing for meals  Follow glucose trend from before meal to 4 hours later to evaluate    Monitor glucoses before meals and at bedtime to help determine insulin adjustments  Goals for blood sugars:  Fasting  (less than 150)  Other times -  (less than 180).      Blood pressure:  Continue losartan 50 mg twice daily

## 2019-09-04 NOTE — PROGRESS NOTES
History of Present Illness: Clay Navarro is a 42 y.o. female presents for follow-up of diabetes. She has had diabetes since around 2007  In 1/2019 C-peptide was 1.1 with glucose of 169. Current diabetes regimen:  Lantus 100 units daily. Might miss once a week. May forget for 3 days every now and then. Novolog: 'winging it'  Tries to take every meal - 20-30 units/meal.   Trulicity 4.74 mg weekly. She is tolerating this and does feel like it may help her appetite. Did not tolerate metformin  Previously took Victoza, but had nausea. Bydureon - had pruritis at injection sites.      Glucoses:   Monitoring every 2-4 weeks. As low as 104 and as high as 300s. She recognizes the need to improve this. We discussed the freestyle pearl CGM. She was interested in this    Diet:   Having less soda. HTN: taking losartan again. 50 mg twice daily. Forgot this AM  Microalbuminuria-improved 3 months ago     BMI 53  Social:        Past Medical History:   Diagnosis Date    BMI 50.0-59.9, adult ([facility] [address] 5/23/2016    Diabetes mellitus type II      Current Outpatient Medications   Medication Sig    insulin glargine (LANTUS SOLOSTAR U-100 INSULIN) 100 unit/mL (3 mL) inpn INJECT 100 UNITS BY SUBCUTANEOUS ROUTE DAILY    omeprazole (PRILOSEC) 40 mg capsule TAKE 1 CAPSULE BY MOUTH EVERY DAY    insulin aspart U-100 (NOVOLOG FLEXPEN U-100 INSULIN) 100 unit/mL inpn INJECT 30-50 UNITS WITH EACH MEAL AND FOR HIGH BLOOD GLUCOSES - UP  UNITS PER DAY    Blood-Glucose Meter (ACCU-CHEK SHOSHANA CONNECT METER) misc by Does Not Apply route. Checks Blood sugar once a month    glucose blood VI test strips (ACCU-CHEK SHOSHANA PLUS TEST STRP) strip by Does Not Apply route See Admin Instructions.  dulaglutide (TRULICITY) 5.10 VU/6.6 mL sub-q pen 0.5 mL by SubCUTAneous route every seven (7) days.  losartan (COZAAR) 50 mg tablet Take 1 Tab by mouth two (2) times a day.     Insulin Needles, Disposable, (PAMELLA PEN NEEDLE) 32 gauge x 5/32\" ndle 4 times daily    Insulin Needles, Disposable, (PAMELLA PEN NEEDLE) 32 x 5/32 \" ndle 5 times daily    Insulin Syringe-Needle U-100 (BD INSULIN SYRINGE ULT-FINE II) 1 mL 31 x 5/16\" Syrg 1 Each by Does Not Apply route three (3) times daily.  Lancets (ONE TOUCH DELICA) Misc by Does Not Apply route three (3) times daily. No current facility-administered medications for this visit. Allergies   Allergen Reactions    Celexa [Citalopram] Anxiety       Review of Systems:  - Eyes: no blurry vision or double vision  - Cardiovascular: no chest pain  - Respiratory: no shortness of breath  - Musculoskeletal: no myalgias  - Neurological: no numbness/tingling in extremities    Physical Examination:  Visit Vitals  BP (!) 145/91 (BP 1 Location: Left arm, BP Patient Position: Sitting)   Pulse 94   Ht 5' 2\" (1.575 m)   Wt 288 lb 6.4 oz (130.8 kg)   BMI 52.75 kg/m²   -   - General: pleasant, no distress, normal gait   HEENT: hearing intact, EOMI, clear sclera without icterus  - Cardiovascular: regular, normal rate   - Respiratory: normal effort  - Integumentary: no edema  - Psychiatric: normal mood and affect    Data Reviewed:   Component      Latest Ref Rng & Units 9/4/2019 4/24/2019 1/30/2019           3:01 PM  4:27 PM  2:19 PM   Hemoglobin A1c (POC)      % 9.0 9.5 10.8     Lab Results   Component Value Date/Time    Sodium 140 01/30/2019 03:02 PM    Potassium 3.9 01/30/2019 03:02 PM    Creatinine 0.70 01/30/2019 03:02 PM    Microalb/Creat ratio (ug/mg creat.) 95.8 04/24/2019 04:19 PM        Lab Results   Component Value Date/Time    Cholesterol, total 188 01/30/2019 03:02 PM    HDL Cholesterol 61 01/30/2019 03:02 PM    LDL, calculated 111 01/30/2019 03:02 PM    Triglyceride 81 01/30/2019 03:02 PM      No results found for: TSH, FT4, TRALT, TMCLT, TSHEXT     Assessment/Plan:   1.  Type 2 diabetes mellitus with microalbuminuria, with long-term current use of insulin (HCC)   Not controlled  However her and I did notice the improvement in her hemoglobin A1c.,  Likely due to decreasing liquid calorie intake, and being a little bit better about consistently taking her insulin  Encouraged her to be even more consistent about taking her Lantus every day  Discussed and adjusting NovoLog prior to meals based on glucose and on anticipated carbohydrate intake  We discussed the freestyle pearl CGM. She was interested in this. Her and I agree that she has had considerable trouble monitoring her glucoses consistently over the past 5 years  Demonstrated and placed a freestyle pearl CGM on her and showed her how this will work. Hopefully the added information this will provide will help us to better adjust her her insulin and also will help her to become more aware of important variables with her diabetes management and diet   2. BMI 50.0-59.9, adult (HCC)   Weight is decreasing mildly. Encouraged continued efforts to avoid liquid calories and to improve food choices and portions   Greater than 50% of 25 minute visit was spent counseling the patient about above. Patient Instructions   Diabetes type II  - Work on diet. Avoid sugared beverages and lower carbohydrate intake      Continue Trulicity    Lantus 488 units daily (two 50 unit injections) -   - Take EVERY day  - follow glucose trend from bedtime to fasting to see how this is working     Novolog -   Take BEFORE you eat based on your glucose level prior to meal and anticipated carbohydrate/starch intake. Continue current dosing for meals  Follow glucose trend from before meal to 4 hours later to evaluate    Monitor glucoses before meals and at bedtime to help determine insulin adjustments  Goals for blood sugars:  Fasting  (less than 150)  Other times -  (less than 180).    Blood pressure:  Continue losartan 50 mg twice daily    Follow-up and Dispositions    · Return in about 3 months (around 12/4/2019).

## 2020-01-09 ENCOUNTER — TELEPHONE (OUTPATIENT)
Dept: ENDOCRINOLOGY | Age: 40
End: 2020-01-09

## 2020-01-09 NOTE — TELEPHONE ENCOUNTER
----- Message from Valeriy Carrillo sent at 1/9/2020  4:28 PM EST -----  Regarding: Dr. Bindu Arrington  Patient return call    Caller's first and last name and relationship (if not the patient):  pt    Best contact number(s):  [phone]    Whose call is being returned: The office    Details to clarify the request:  Pt returning a miss call in regards to r/s upcoming appt scheduled tomorrow for an earlier time. Pt state, she is unable to come in earlier tomorrow and would like to keep appt scheduled tomorrow.      Valeriy Carrillo

## 2020-01-10 ENCOUNTER — OFFICE VISIT (OUTPATIENT)
Dept: ENDOCRINOLOGY | Age: 40
End: 2020-01-10

## 2020-01-10 VITALS
SYSTOLIC BLOOD PRESSURE: 170 MMHG | DIASTOLIC BLOOD PRESSURE: 88 MMHG | WEIGHT: 293 LBS | HEART RATE: 82 BPM | HEIGHT: 62 IN | BODY MASS INDEX: 53.92 KG/M2

## 2020-01-10 DIAGNOSIS — E11.29 TYPE 2 DIABETES MELLITUS WITH MICROALBUMINURIA, WITH LONG-TERM CURRENT USE OF INSULIN (HCC): Primary | ICD-10-CM

## 2020-01-10 DIAGNOSIS — Z79.4 TYPE 2 DIABETES MELLITUS WITH MICROALBUMINURIA, WITH LONG-TERM CURRENT USE OF INSULIN (HCC): Primary | ICD-10-CM

## 2020-01-10 DIAGNOSIS — R80.9 TYPE 2 DIABETES MELLITUS WITH MICROALBUMINURIA, WITH LONG-TERM CURRENT USE OF INSULIN (HCC): Primary | ICD-10-CM

## 2020-01-10 DIAGNOSIS — E66.01 MORBID OBESITY (HCC): ICD-10-CM

## 2020-01-10 LAB — HBA1C MFR BLD HPLC: 9.1 %

## 2020-01-10 NOTE — PROGRESS NOTES
Stephanie Meehan is a 42 y.o. female presents for follow-up of diabetes. She has had diabetes since around 2007  In 1/2019 C-peptide was 1.1 with glucose of 169.     Current diabetes regimen:  Lantus 100 units daily. Might miss once a week. May forget for 3 days every now and then. Novolog: 'winging it'  Tries to take every meal - 20-30 units/meal.   Trulicity 3.71 mg weekly. She is tolerating this and does feel like it may help her appetite.     Did not tolerate metformin  Previously took Victoza, but had nausea. Bydureon - had pruritis at injection sites. She last saw Dr. Abrahan Bass in September. At that time her A1c was 9.0. Her A1c today is 9.1. She continues taking the above medications. She readily admits that she misses doses a lot. She forgets to take the Lantus and then she will have periods of time where there is no basal insulin and then she will try to remember to take it. She does not stack the Lantus insulin. She is tolerating all the medications without side effects. Examination  Blood pressure 170/88  Pulse 82  Weight 301    Impression type 2 diabetes mellitus and morbid obesity with elevated blood sugars on combination therapy  Plan: I have increase the Trulicity to 1.5 mg.  I hope she tolerates that. I have also suggested that she put alarms on her phone to make sure that she takes the basal insulin on a consistent basis. I will see her back in 3 to 4 months.     We spent more than 25 mintutes face to face, more than 50% was in counseling regarding diet, exercise and carbohydrate intake.

## 2020-01-23 DIAGNOSIS — Z79.4 TYPE 2 DIABETES MELLITUS WITH MICROALBUMINURIA, WITH LONG-TERM CURRENT USE OF INSULIN (HCC): ICD-10-CM

## 2020-01-23 DIAGNOSIS — E11.29 TYPE 2 DIABETES MELLITUS WITH MICROALBUMINURIA, WITH LONG-TERM CURRENT USE OF INSULIN (HCC): ICD-10-CM

## 2020-01-23 DIAGNOSIS — R80.9 TYPE 2 DIABETES MELLITUS WITH MICROALBUMINURIA, WITH LONG-TERM CURRENT USE OF INSULIN (HCC): ICD-10-CM

## 2020-03-01 DIAGNOSIS — E11.9 DIABETES MELLITUS WITHOUT COMPLICATION (HCC): ICD-10-CM

## 2020-03-01 RX ORDER — INSULIN ASPART 100 [IU]/ML
INJECTION, SOLUTION INTRAVENOUS; SUBCUTANEOUS
Qty: 135 ML | Refills: 2 | Status: SHIPPED | OUTPATIENT
Start: 2020-03-01 | End: 2020-03-02 | Stop reason: CLARIF

## 2020-03-02 DIAGNOSIS — Z79.4 TYPE 2 DIABETES MELLITUS WITH MICROALBUMINURIA, WITH LONG-TERM CURRENT USE OF INSULIN (HCC): Primary | ICD-10-CM

## 2020-03-02 DIAGNOSIS — R80.9 TYPE 2 DIABETES MELLITUS WITH MICROALBUMINURIA, WITH LONG-TERM CURRENT USE OF INSULIN (HCC): Primary | ICD-10-CM

## 2020-03-02 DIAGNOSIS — E11.29 TYPE 2 DIABETES MELLITUS WITH MICROALBUMINURIA, WITH LONG-TERM CURRENT USE OF INSULIN (HCC): Primary | ICD-10-CM

## 2020-05-08 ENCOUNTER — VIRTUAL VISIT (OUTPATIENT)
Dept: ENDOCRINOLOGY | Age: 40
End: 2020-05-08

## 2020-05-08 DIAGNOSIS — Z79.4 TYPE 2 DIABETES MELLITUS WITH MICROALBUMINURIA, WITH LONG-TERM CURRENT USE OF INSULIN (HCC): Primary | ICD-10-CM

## 2020-05-08 DIAGNOSIS — E11.29 TYPE 2 DIABETES MELLITUS WITH MICROALBUMINURIA, WITH LONG-TERM CURRENT USE OF INSULIN (HCC): Primary | ICD-10-CM

## 2020-05-08 DIAGNOSIS — E66.01 MORBID OBESITY (HCC): ICD-10-CM

## 2020-05-08 DIAGNOSIS — R80.9 TYPE 2 DIABETES MELLITUS WITH MICROALBUMINURIA, WITH LONG-TERM CURRENT USE OF INSULIN (HCC): Primary | ICD-10-CM

## 2020-05-08 NOTE — PROGRESS NOTES
This is an established visit conducted via telemedicine using Vput[employer] video. The patient has been instructed that this meets HIPAA criteria and acknowledges and agrees to this method of visitation. Meghan Santoro a 42 y. o. female presents for follow-up of diabetes.  She has had diabetes since around 2007. She was previously followed by Dr. Lianna Mora. I saw her in January. Her A1c at that visit was 9.1%. She is on a regimen of medications in someone with insulin resistance. ICurrent diabetes regimen:  Lantus 100 units daily. Might miss once a week.  May forget for 3 days every now and then. Humalog: 'winging it'  Tries to take every meal - 20-30 units/meal.   Trulicity 1.5  mg weekly. Martha Rich is not tolerating this and does feel like it may help her appetite. . She does tell me that the increase from 0.75 to 1.5 mg of Trulicity has resulted in increased nausea and she would like to go back to the lower dose.     She has a prescription for freestyle pearl but does not have any sensors so she is not been checking her blood sugar at all. She usually eats 2 meals a day. Usually that is eggs and arriaga or oatmeal.  Last night for dinner she had fried chicken corn rolls and mashed potatoes. The night before that she had a roast with mashed potatoes and vegetables. She occasionally has ice cream for dessert. She denies chest pain, shortness of breath, constipation diarrhea or neuropathic symptoms. She also denies any yeast infections.     Examination  GENERAL: NCAT, Appears well nourished   EYES: EOMI, non-icteric, no proptosis   Ear/Nose/Throat: NCAT, no visible inflammation or masses   CARDIOVASCULAR: no cyanosis, no visible JVD   RESPIRATORY: comfortable respirations observed, no cyanosis   MUSCULOSKELETAL: Normal ROM of upper extremities observed   SKIN: No edema, rash, or other significant changes observed   NEUROLOGIC:  AAOx3   PSYCHIATRIC: Normal affect, Normal insight and judgement       Impression type 2 diabetes mellitus and morbid obesity with poor blood sugar control on combination therapy including 100 units of Lantus a day, Humalog 30 with meals and Trulicity 1.5 but with side effects. Plan: I have decreased the Trulicity 8.89. I have encouraged her to get a freestyle pearl sensor so that she can download data and send it to me. Based on the data, we can make changes as needed. I do anticipate her blood sugars being quite elevated and her average blood sugar being consistent with an A1c of 9%.

## 2020-05-11 DIAGNOSIS — Z79.4 TYPE 2 DIABETES MELLITUS WITH MICROALBUMINURIA, WITH LONG-TERM CURRENT USE OF INSULIN (HCC): Primary | ICD-10-CM

## 2020-05-11 DIAGNOSIS — E11.29 TYPE 2 DIABETES MELLITUS WITH MICROALBUMINURIA, WITH LONG-TERM CURRENT USE OF INSULIN (HCC): Primary | ICD-10-CM

## 2020-05-11 DIAGNOSIS — R80.9 TYPE 2 DIABETES MELLITUS WITH MICROALBUMINURIA, WITH LONG-TERM CURRENT USE OF INSULIN (HCC): Primary | ICD-10-CM

## 2020-05-11 RX ORDER — FUROSEMIDE 20 MG/1
20 TABLET ORAL DAILY
Qty: 30 TAB | Refills: 1 | Status: SHIPPED | OUTPATIENT
Start: 2020-05-11 | End: 2020-06-03

## 2020-06-03 DIAGNOSIS — Z79.4 TYPE 2 DIABETES MELLITUS WITH MICROALBUMINURIA, WITH LONG-TERM CURRENT USE OF INSULIN (HCC): ICD-10-CM

## 2020-06-03 DIAGNOSIS — R80.9 TYPE 2 DIABETES MELLITUS WITH MICROALBUMINURIA, WITH LONG-TERM CURRENT USE OF INSULIN (HCC): ICD-10-CM

## 2020-06-03 DIAGNOSIS — E11.29 TYPE 2 DIABETES MELLITUS WITH MICROALBUMINURIA, WITH LONG-TERM CURRENT USE OF INSULIN (HCC): ICD-10-CM

## 2020-06-03 RX ORDER — FUROSEMIDE 20 MG/1
TABLET ORAL
Qty: 30 TAB | Refills: 1 | Status: SHIPPED | OUTPATIENT
Start: 2020-06-03 | End: 2020-06-29 | Stop reason: SDUPTHER

## 2020-06-11 ENCOUNTER — TELEPHONE (OUTPATIENT)
Dept: ENDOCRINOLOGY | Age: 40
End: 2020-06-11

## 2020-06-11 NOTE — TELEPHONE ENCOUNTER
I received a fax from [employer] on this patient who appears to now be followed by Dr. Joshua Mcduffie stating that her insurance does not cover the freestyle pearl sensors. I will defer to him if he thinks a prior auth will allow this to be covered or if she will need to pay cash for these. Routed this to him and his nurse, Cornelius Hooks.

## 2020-06-22 DIAGNOSIS — Z79.4 TYPE 2 DIABETES MELLITUS WITH MICROALBUMINURIA, WITH LONG-TERM CURRENT USE OF INSULIN (HCC): ICD-10-CM

## 2020-06-22 DIAGNOSIS — E11.29 TYPE 2 DIABETES MELLITUS WITH MICROALBUMINURIA, WITH LONG-TERM CURRENT USE OF INSULIN (HCC): ICD-10-CM

## 2020-06-22 DIAGNOSIS — R80.9 TYPE 2 DIABETES MELLITUS WITH MICROALBUMINURIA, WITH LONG-TERM CURRENT USE OF INSULIN (HCC): ICD-10-CM

## 2020-06-22 RX ORDER — INSULIN LISPRO 200 [IU]/ML
INJECTION, SOLUTION SUBCUTANEOUS
Qty: 20 ADJUSTABLE DOSE PRE-FILLED PEN SYRINGE | Refills: 3 | Status: SHIPPED | OUTPATIENT
Start: 2020-06-22 | End: 2021-07-01

## 2020-06-22 NOTE — TELEPHONE ENCOUNTER
Requested Prescriptions     Pending Prescriptions Disp Refills    insulin lispro (HumaLOG KwikPen Insulin) 200 unit/mL (3 mL) inpn 20 Adjustable Dose Pre-filled Pen Syringe 3     Si-40 units tid ac

## 2020-06-29 DIAGNOSIS — Z79.4 TYPE 2 DIABETES MELLITUS WITH MICROALBUMINURIA, WITH LONG-TERM CURRENT USE OF INSULIN (HCC): ICD-10-CM

## 2020-06-29 DIAGNOSIS — E11.29 TYPE 2 DIABETES MELLITUS WITH MICROALBUMINURIA, WITH LONG-TERM CURRENT USE OF INSULIN (HCC): ICD-10-CM

## 2020-06-29 DIAGNOSIS — R80.9 TYPE 2 DIABETES MELLITUS WITH MICROALBUMINURIA, WITH LONG-TERM CURRENT USE OF INSULIN (HCC): ICD-10-CM

## 2020-06-29 RX ORDER — FUROSEMIDE 20 MG/1
TABLET ORAL
Qty: 30 TAB | Refills: 1 | Status: SHIPPED | OUTPATIENT
Start: 2020-06-29 | End: 2020-10-11

## 2020-06-29 NOTE — TELEPHONE ENCOUNTER
Requested Prescriptions     Pending Prescriptions Disp Refills    furosemide (LASIX) 20 mg tablet 30 Tab      Sig: TAKE 1 TABLET BY MOUTH EVERY DAY

## 2020-08-31 ENCOUNTER — VIRTUAL VISIT (OUTPATIENT)
Dept: ENDOCRINOLOGY | Age: 40
End: 2020-08-31

## 2020-08-31 DIAGNOSIS — Z79.4 TYPE 2 DIABETES MELLITUS WITH MICROALBUMINURIA, WITH LONG-TERM CURRENT USE OF INSULIN (HCC): Primary | ICD-10-CM

## 2020-08-31 DIAGNOSIS — E11.29 TYPE 2 DIABETES MELLITUS WITH MICROALBUMINURIA, WITH LONG-TERM CURRENT USE OF INSULIN (HCC): Primary | ICD-10-CM

## 2020-08-31 DIAGNOSIS — R80.9 TYPE 2 DIABETES MELLITUS WITH MICROALBUMINURIA, WITH LONG-TERM CURRENT USE OF INSULIN (HCC): Primary | ICD-10-CM

## 2020-09-15 RX ORDER — FLASH GLUCOSE SENSOR
KIT MISCELLANEOUS
Qty: 2 KIT | Refills: 11 | Status: SHIPPED | OUTPATIENT
Start: 2020-09-15 | End: 2021-09-15

## 2020-09-15 NOTE — TELEPHONE ENCOUNTER
Requested Prescriptions     Pending Prescriptions Disp Refills    FreeStyle Reji 14 Day Sensor kit 2 Kit 11     Si sensors per 28 days.  Change every 14 days

## 2020-09-17 RX ORDER — INSULIN GLARGINE 100 [IU]/ML
INJECTION, SOLUTION SUBCUTANEOUS
Qty: 30 ML | Refills: 10 | Status: SHIPPED | OUTPATIENT
Start: 2020-09-17 | End: 2021-09-20

## 2020-09-17 NOTE — TELEPHONE ENCOUNTER
Requested Prescriptions     Pending Prescriptions Disp Refills    insulin glargine (Lantus Solostar U-100 Insulin) 100 unit/mL (3 mL) inpn 30 mL 10     Sig: INJECT 100 UNITS BY SUBCUTANEOUS ROUTE DAILY

## 2020-10-09 DIAGNOSIS — Z79.4 TYPE 2 DIABETES MELLITUS WITH MICROALBUMINURIA, WITH LONG-TERM CURRENT USE OF INSULIN (HCC): ICD-10-CM

## 2020-10-09 DIAGNOSIS — E11.29 TYPE 2 DIABETES MELLITUS WITH MICROALBUMINURIA, WITH LONG-TERM CURRENT USE OF INSULIN (HCC): ICD-10-CM

## 2020-10-09 DIAGNOSIS — R80.9 TYPE 2 DIABETES MELLITUS WITH MICROALBUMINURIA, WITH LONG-TERM CURRENT USE OF INSULIN (HCC): ICD-10-CM

## 2020-10-11 RX ORDER — FUROSEMIDE 20 MG/1
TABLET ORAL
Qty: 30 TAB | Refills: 1 | Status: SHIPPED | OUTPATIENT
Start: 2020-10-11 | End: 2020-11-13

## 2020-11-13 DIAGNOSIS — Z79.4 TYPE 2 DIABETES MELLITUS WITH MICROALBUMINURIA, WITH LONG-TERM CURRENT USE OF INSULIN (HCC): ICD-10-CM

## 2020-11-13 DIAGNOSIS — E11.29 TYPE 2 DIABETES MELLITUS WITH MICROALBUMINURIA, WITH LONG-TERM CURRENT USE OF INSULIN (HCC): ICD-10-CM

## 2020-11-13 DIAGNOSIS — R80.9 TYPE 2 DIABETES MELLITUS WITH MICROALBUMINURIA, WITH LONG-TERM CURRENT USE OF INSULIN (HCC): ICD-10-CM

## 2020-11-13 RX ORDER — FUROSEMIDE 20 MG/1
TABLET ORAL
Qty: 30 TAB | Refills: 1 | Status: SHIPPED | OUTPATIENT
Start: 2020-11-13 | End: 2021-05-09

## 2021-04-22 ENCOUNTER — TELEPHONE (OUTPATIENT)
Dept: ENDOCRINOLOGY | Age: 41
End: 2021-04-22

## 2021-04-22 DIAGNOSIS — R80.9 TYPE 2 DIABETES MELLITUS WITH MICROALBUMINURIA, WITH LONG-TERM CURRENT USE OF INSULIN (HCC): Primary | ICD-10-CM

## 2021-04-22 DIAGNOSIS — E11.29 TYPE 2 DIABETES MELLITUS WITH MICROALBUMINURIA, WITH LONG-TERM CURRENT USE OF INSULIN (HCC): Primary | ICD-10-CM

## 2021-04-22 DIAGNOSIS — Z79.4 TYPE 2 DIABETES MELLITUS WITH MICROALBUMINURIA, WITH LONG-TERM CURRENT USE OF INSULIN (HCC): Primary | ICD-10-CM

## 2021-04-22 NOTE — TELEPHONE ENCOUNTER
4/22/2021  9:27 AM      Patient requesting a call concerning refill for Trulicity. Told by pharmacy auth required.     [phone]    Thanks,  Cinthya Guevara

## 2021-04-22 NOTE — TELEPHONE ENCOUNTER
Spoke with the patient and informed her that I will initiate the PA for her Trulicity today and will inform her when I receive a response from the insurance company. Patient also scheduled a tele medicine visit with Dr. Tano Wesley on 5/24/2021 at 11:50 am.    Labs were ordered for the patient to have done at least 1 week prior to her visit.

## 2021-04-22 NOTE — TELEPHONE ENCOUNTER
PA for Trulicity has been approved by [employer]. PA is good until 4/22/2022. Patient has been notified.

## 2021-05-08 DIAGNOSIS — R80.9 TYPE 2 DIABETES MELLITUS WITH MICROALBUMINURIA, WITH LONG-TERM CURRENT USE OF INSULIN (HCC): ICD-10-CM

## 2021-05-08 DIAGNOSIS — E11.29 TYPE 2 DIABETES MELLITUS WITH MICROALBUMINURIA, WITH LONG-TERM CURRENT USE OF INSULIN (HCC): ICD-10-CM

## 2021-05-08 DIAGNOSIS — Z79.4 TYPE 2 DIABETES MELLITUS WITH MICROALBUMINURIA, WITH LONG-TERM CURRENT USE OF INSULIN (HCC): ICD-10-CM

## 2021-05-09 RX ORDER — FUROSEMIDE 20 MG/1
TABLET ORAL
Qty: 30 TAB | Refills: 1 | Status: SHIPPED | OUTPATIENT
Start: 2021-05-09 | End: 2021-06-11

## 2021-05-12 LAB
ALBUMIN SERPL-MCNC: 3.9 G/DL (ref 3.8–4.8)
ALBUMIN/CREAT UR: 432 MG/G CREAT (ref 0–29)
ALBUMIN/GLOB SERPL: 1.1 {RATIO} (ref 1.2–2.2)
ALP SERPL-CCNC: 107 IU/L (ref 39–117)
ALT SERPL-CCNC: 28 IU/L (ref 0–32)
AST SERPL-CCNC: 14 IU/L (ref 0–40)
BILIRUB SERPL-MCNC: <0.2 MG/DL (ref 0–1.2)
BUN SERPL-MCNC: 20 MG/DL (ref 6–20)
BUN/CREAT SERPL: 17 (ref 9–23)
CALCIUM SERPL-MCNC: 9.2 MG/DL (ref 8.7–10.2)
CHLORIDE SERPL-SCNC: 101 MMOL/L (ref 96–106)
CHOLEST SERPL-MCNC: 181 MG/DL (ref 100–199)
CO2 SERPL-SCNC: 26 MMOL/L (ref 20–29)
CREAT SERPL-MCNC: 1.2 MG/DL (ref 0.57–1)
CREAT UR-MCNC: 111.8 MG/DL
EST. AVERAGE GLUCOSE BLD GHB EST-MCNC: 252 MG/DL
GLOBULIN SER CALC-MCNC: 3.4 G/DL (ref 1.5–4.5)
GLUCOSE SERPL-MCNC: 180 MG/DL (ref 65–99)
HBA1C MFR BLD: 10.4 % (ref 4.8–5.6)
HDLC SERPL-MCNC: 62 MG/DL
IMP & REVIEW OF LAB RESULTS: NORMAL
INTERPRETATION: NORMAL
LDLC SERPL CALC-MCNC: 99 MG/DL (ref 0–99)
MICROALBUMIN UR-MCNC: 483 UG/ML
POTASSIUM SERPL-SCNC: 3.9 MMOL/L (ref 3.5–5.2)
PROT SERPL-MCNC: 7.3 G/DL (ref 6–8.5)
SODIUM SERPL-SCNC: 138 MMOL/L (ref 134–144)
TRIGL SERPL-MCNC: 112 MG/DL (ref 0–149)
VLDLC SERPL CALC-MCNC: 20 MG/DL (ref 5–40)

## 2021-05-24 ENCOUNTER — VIRTUAL VISIT (OUTPATIENT)
Dept: ENDOCRINOLOGY | Age: 41
End: 2021-05-24
Payer: COMMERCIAL

## 2021-05-24 DIAGNOSIS — R80.9 TYPE 2 DIABETES MELLITUS WITH MICROALBUMINURIA, WITH LONG-TERM CURRENT USE OF INSULIN (HCC): Primary | ICD-10-CM

## 2021-05-24 DIAGNOSIS — E11.29 TYPE 2 DIABETES MELLITUS WITH MICROALBUMINURIA, WITH LONG-TERM CURRENT USE OF INSULIN (HCC): Primary | ICD-10-CM

## 2021-05-24 DIAGNOSIS — Z79.4 TYPE 2 DIABETES MELLITUS WITH MICROALBUMINURIA, WITH LONG-TERM CURRENT USE OF INSULIN (HCC): Primary | ICD-10-CM

## 2021-05-24 PROCEDURE — 99214 OFFICE O/P EST MOD 30 MIN: CPT | Performed by: INTERNAL MEDICINE

## 2021-05-24 RX ORDER — MONTELUKAST SODIUM 10 MG/1
TABLET ORAL
COMMUNITY
Start: 2021-04-16 | End: 2022-08-17

## 2021-05-24 NOTE — PROGRESS NOTES
This is an established visit conducted via telemedicine using en[employer] video. The patient has been instructed that this meets HIPAA criteria and acknowledges and agrees to this method of visitation.     Yasmeen Forde a 42 y. o. female presents for follow-up of diabetes.  She has had diabetes since around 2007. She was previously followed by Dr. Kerri Vaughn. I saw her in January. Her A1c at that visit was 9.1%. She is on a regimen of medications in someone with insulin resistance. Current diabetes regimen:  Lantus 100 units daily. Might miss once a week.  May forget for 3 days every now and then. Humalog: 'winging it'  Tries to take every meal - 20-30 units/meal.   Trulicity 1.5  mg weekly. Hipolito Salcedo is not tolerating this and does feel like it may help her appetite. . She does tell me that the increase from 0.75 to 1.5 mg of Trulicity has resulted in increased nausea and she would like to go back to the lower dose.     Since I saw her last, her blood sugars have increased. Her A1c this month is 10.4% and her GFR is 60. She admits that she is missing some doses of Lantus occasionally. She does take 30 to 40 units of Humalog with her meals. We were able to download her freestyle pearl. Her average blood sugar is 233 with an estimated A1c of 8.9 which is better than the 10.4. She admits that her diet is not great. A typical meal could be a [employer] chicken meal with 30 rice or chicken tenders and dirty rice. Sometimes she will have rotisserie chicken and vegetables. Overall the blood sugars are significantly elevated. 1 trend that seems to be consistent is that about 12 or 1:00 in the afternoon she will take 100 units of Lantus +40 units of Humalog and she can for a 4.  4-hour period of time to see a blood sugar that gets into the normal range. The blood sugars then go right back up into the 250s and 300s.     GENERAL: NCAT, Appears well nourished   EYES: EOMI, non-icteric, no proptosis   Ear/Nose/Throat: NCAT, no visible inflammation or masses   CARDIOVASCULAR: no cyanosis, no visible JVD   RESPIRATORY: comfortable respirations observed, no cyanosis   MUSCULOSKELETAL: Normal ROM of upper extremities observed   SKIN: No edema, rash, or other significant changes observed   NEUROLOGIC:  AAOx3   PSYCHIATRIC: Normal affect, Normal insight and judgement       Impression  1. Type 2 diabetes mellitus with poor blood sugar control  2. Morbid obesity with a BMI of greater than 50  3. Chronic kidney disease stage III a    Plan:  1. I have asked her to take her Lantus in the morning and in the evening. She clearly is not getting 24 hours out of single dose of Lantus insulin. I also increase the dose to 60 units twice daily  2. I continue the Humalog at 30 to 40 units with meals  3. I have asked her to send me a freestyle pearl tracing in 1 month after taking the Lantus twice daily  4.   I will see her back in 2 months face-to-face

## 2021-06-05 RX ORDER — LOSARTAN POTASSIUM 50 MG/1
50 TABLET ORAL 2 TIMES DAILY
Qty: 180 TABLET | Refills: 3 | OUTPATIENT
Start: 2021-06-05

## 2021-06-06 RX ORDER — LOSARTAN POTASSIUM 50 MG/1
50 TABLET ORAL 2 TIMES DAILY
Qty: 180 TABLET | Refills: 3 | Status: SHIPPED | OUTPATIENT
Start: 2021-06-06 | End: 2022-10-28 | Stop reason: ALTCHOICE

## 2021-06-11 DIAGNOSIS — E11.29 TYPE 2 DIABETES MELLITUS WITH MICROALBUMINURIA, WITH LONG-TERM CURRENT USE OF INSULIN (HCC): ICD-10-CM

## 2021-06-11 DIAGNOSIS — Z79.4 TYPE 2 DIABETES MELLITUS WITH MICROALBUMINURIA, WITH LONG-TERM CURRENT USE OF INSULIN (HCC): ICD-10-CM

## 2021-06-11 DIAGNOSIS — R80.9 TYPE 2 DIABETES MELLITUS WITH MICROALBUMINURIA, WITH LONG-TERM CURRENT USE OF INSULIN (HCC): ICD-10-CM

## 2021-06-11 RX ORDER — FUROSEMIDE 20 MG/1
TABLET ORAL
Qty: 30 TABLET | Refills: 1 | Status: SHIPPED | OUTPATIENT
Start: 2021-06-11 | End: 2021-07-07

## 2021-07-07 DIAGNOSIS — Z79.4 TYPE 2 DIABETES MELLITUS WITH MICROALBUMINURIA, WITH LONG-TERM CURRENT USE OF INSULIN (HCC): ICD-10-CM

## 2021-07-07 DIAGNOSIS — E11.29 TYPE 2 DIABETES MELLITUS WITH MICROALBUMINURIA, WITH LONG-TERM CURRENT USE OF INSULIN (HCC): ICD-10-CM

## 2021-07-07 DIAGNOSIS — R80.9 TYPE 2 DIABETES MELLITUS WITH MICROALBUMINURIA, WITH LONG-TERM CURRENT USE OF INSULIN (HCC): ICD-10-CM

## 2021-07-07 RX ORDER — FUROSEMIDE 20 MG/1
TABLET ORAL
Qty: 30 TABLET | Refills: 1 | Status: SHIPPED | OUTPATIENT
Start: 2021-07-07 | End: 2021-08-09

## 2021-08-04 DIAGNOSIS — R80.9 TYPE 2 DIABETES MELLITUS WITH MICROALBUMINURIA, WITH LONG-TERM CURRENT USE OF INSULIN (HCC): ICD-10-CM

## 2021-08-04 DIAGNOSIS — E11.29 TYPE 2 DIABETES MELLITUS WITH MICROALBUMINURIA, WITH LONG-TERM CURRENT USE OF INSULIN (HCC): ICD-10-CM

## 2021-08-04 DIAGNOSIS — Z79.4 TYPE 2 DIABETES MELLITUS WITH MICROALBUMINURIA, WITH LONG-TERM CURRENT USE OF INSULIN (HCC): ICD-10-CM

## 2021-08-04 RX ORDER — DULAGLUTIDE 0.75 MG/.5ML
0.75 INJECTION, SOLUTION SUBCUTANEOUS
Qty: 12 SYRINGE | Refills: 3 | Status: SHIPPED | OUTPATIENT
Start: 2021-08-04 | End: 2022-08-17

## 2021-08-09 DIAGNOSIS — E11.29 TYPE 2 DIABETES MELLITUS WITH MICROALBUMINURIA, WITH LONG-TERM CURRENT USE OF INSULIN (HCC): ICD-10-CM

## 2021-08-09 DIAGNOSIS — Z79.4 TYPE 2 DIABETES MELLITUS WITH MICROALBUMINURIA, WITH LONG-TERM CURRENT USE OF INSULIN (HCC): ICD-10-CM

## 2021-08-09 DIAGNOSIS — R80.9 TYPE 2 DIABETES MELLITUS WITH MICROALBUMINURIA, WITH LONG-TERM CURRENT USE OF INSULIN (HCC): ICD-10-CM

## 2021-08-09 RX ORDER — FUROSEMIDE 20 MG/1
TABLET ORAL
Qty: 30 TABLET | Refills: 1 | Status: SHIPPED | OUTPATIENT
Start: 2021-08-09 | End: 2021-09-09

## 2021-09-09 DIAGNOSIS — R80.9 TYPE 2 DIABETES MELLITUS WITH MICROALBUMINURIA, WITH LONG-TERM CURRENT USE OF INSULIN (HCC): ICD-10-CM

## 2021-09-09 DIAGNOSIS — Z79.4 TYPE 2 DIABETES MELLITUS WITH MICROALBUMINURIA, WITH LONG-TERM CURRENT USE OF INSULIN (HCC): ICD-10-CM

## 2021-09-09 DIAGNOSIS — E11.29 TYPE 2 DIABETES MELLITUS WITH MICROALBUMINURIA, WITH LONG-TERM CURRENT USE OF INSULIN (HCC): ICD-10-CM

## 2021-09-09 RX ORDER — FUROSEMIDE 20 MG/1
TABLET ORAL
Qty: 30 TABLET | Refills: 1 | Status: SHIPPED | OUTPATIENT
Start: 2021-09-09 | End: 2021-12-16

## 2021-09-15 RX ORDER — FLASH GLUCOSE SENSOR
KIT MISCELLANEOUS
Qty: 2 KIT | Refills: 11 | Status: SHIPPED | OUTPATIENT
Start: 2021-09-15 | End: 2022-09-19

## 2021-12-16 DIAGNOSIS — R80.9 TYPE 2 DIABETES MELLITUS WITH MICROALBUMINURIA, WITH LONG-TERM CURRENT USE OF INSULIN (HCC): ICD-10-CM

## 2021-12-16 DIAGNOSIS — Z79.4 TYPE 2 DIABETES MELLITUS WITH MICROALBUMINURIA, WITH LONG-TERM CURRENT USE OF INSULIN (HCC): ICD-10-CM

## 2021-12-16 DIAGNOSIS — E11.29 TYPE 2 DIABETES MELLITUS WITH MICROALBUMINURIA, WITH LONG-TERM CURRENT USE OF INSULIN (HCC): ICD-10-CM

## 2021-12-16 RX ORDER — FUROSEMIDE 20 MG/1
TABLET ORAL
Qty: 30 TABLET | Refills: 1 | Status: SHIPPED | OUTPATIENT
Start: 2021-12-16 | End: 2022-08-17

## 2022-03-19 PROBLEM — E11.21 TYPE 2 DIABETES WITH NEPHROPATHY (HCC): Status: ACTIVE | Noted: 2019-04-24

## 2022-05-04 RX ORDER — BLOOD SUGAR DIAGNOSTIC
STRIP MISCELLANEOUS
Qty: 300 STRIP | Refills: 3 | Status: SHIPPED | OUTPATIENT
Start: 2022-05-04

## 2022-05-04 NOTE — TELEPHONE ENCOUNTER
Requested Prescriptions     Pending Prescriptions Disp Refills    glucose blood VI test strips (OneTouch Verio test strips) strip 300 Strip 3     Sig: Test blood sugars 3 times daily

## 2022-05-20 DIAGNOSIS — R80.9 TYPE 2 DIABETES MELLITUS WITH MICROALBUMINURIA, WITH LONG-TERM CURRENT USE OF INSULIN (HCC): ICD-10-CM

## 2022-05-20 DIAGNOSIS — E11.29 TYPE 2 DIABETES MELLITUS WITH MICROALBUMINURIA, WITH LONG-TERM CURRENT USE OF INSULIN (HCC): ICD-10-CM

## 2022-05-20 DIAGNOSIS — Z79.4 TYPE 2 DIABETES MELLITUS WITH MICROALBUMINURIA, WITH LONG-TERM CURRENT USE OF INSULIN (HCC): ICD-10-CM

## 2022-05-20 RX ORDER — FUROSEMIDE 20 MG/1
20 TABLET ORAL DAILY
Qty: 30 TABLET | Refills: 1 | Status: CANCELLED | OUTPATIENT
Start: 2022-05-20

## 2022-06-15 ENCOUNTER — APPOINTMENT (OUTPATIENT)
Dept: ULTRASOUND IMAGING | Age: 42
End: 2022-06-15
Attending: PHYSICIAN ASSISTANT
Payer: COMMERCIAL

## 2022-06-15 ENCOUNTER — HOSPITAL ENCOUNTER (EMERGENCY)
Age: 42
Discharge: HOME | End: 2022-06-16
Attending: EMERGENCY MEDICINE | Admitting: EMERGENCY MEDICINE
Payer: COMMERCIAL

## 2022-06-15 DIAGNOSIS — R10.816 EPIGASTRIC ABDOMINAL TENDERNESS WITHOUT REBOUND TENDERNESS: Primary | ICD-10-CM

## 2022-06-15 LAB
ALBUMIN SERPL-MCNC: 2.8 G/DL (ref 3.5–5)
ALBUMIN/GLOB SERPL: 0.6 {RATIO} (ref 1.1–2.2)
ALP SERPL-CCNC: 86 U/L (ref 45–117)
ALT SERPL-CCNC: 34 U/L (ref 12–78)
ANION GAP SERPL CALC-SCNC: 4 MMOL/L (ref 5–15)
AST SERPL-CCNC: 11 U/L (ref 15–37)
BASOPHILS # BLD: 0 K/UL (ref 0–0.1)
BASOPHILS NFR BLD: 0 % (ref 0–1)
BILIRUB SERPL-MCNC: 0.3 MG/DL (ref 0.2–1)
BUN SERPL-MCNC: 19 MG/DL (ref 6–20)
BUN/CREAT SERPL: 13 (ref 12–20)
CALCIUM SERPL-MCNC: 9.2 MG/DL (ref 8.5–10.1)
CHLORIDE SERPL-SCNC: 107 MMOL/L (ref 97–108)
CO2 SERPL-SCNC: 28 MMOL/L (ref 21–32)
COMMENT, HOLDF: NORMAL
CREAT SERPL-MCNC: 1.48 MG/DL (ref 0.55–1.02)
DIFFERENTIAL METHOD BLD: ABNORMAL
EOSINOPHIL # BLD: 0.4 K/UL (ref 0–0.4)
EOSINOPHIL NFR BLD: 3 % (ref 0–7)
ERYTHROCYTE [DISTWIDTH] IN BLOOD BY AUTOMATED COUNT: 14.2 % (ref 11.5–14.5)
GLOBULIN SER CALC-MCNC: 4.4 G/DL (ref 2–4)
GLUCOSE SERPL-MCNC: 216 MG/DL (ref 65–100)
HCT VFR BLD AUTO: 29.4 % (ref 35–47)
HGB BLD-MCNC: 9.6 G/DL (ref 11.5–16)
IMM GRANULOCYTES # BLD AUTO: 0.1 K/UL (ref 0–0.04)
IMM GRANULOCYTES NFR BLD AUTO: 1 % (ref 0–0.5)
LIPASE SERPL-CCNC: 57 U/L (ref 73–393)
LYMPHOCYTES # BLD: 3.3 K/UL (ref 0.8–3.5)
LYMPHOCYTES NFR BLD: 28 % (ref 12–49)
MCH RBC QN AUTO: 27.5 PG (ref 26–34)
MCHC RBC AUTO-ENTMCNC: 32.7 G/DL (ref 30–36.5)
MCV RBC AUTO: 84.2 FL (ref 80–99)
MONOCYTES # BLD: 0.4 K/UL (ref 0–1)
MONOCYTES NFR BLD: 4 % (ref 5–13)
NEUTS SEG # BLD: 7.6 K/UL (ref 1.8–8)
NEUTS SEG NFR BLD: 64 % (ref 32–75)
NRBC # BLD: 0 K/UL (ref 0–0.01)
NRBC BLD-RTO: 0 PER 100 WBC
PLATELET # BLD AUTO: 312 K/UL (ref 150–400)
PMV BLD AUTO: 9.3 FL (ref 8.9–12.9)
POTASSIUM SERPL-SCNC: 4.1 MMOL/L (ref 3.5–5.1)
PROT SERPL-MCNC: 7.2 G/DL (ref 6.4–8.2)
RBC # BLD AUTO: 3.49 M/UL (ref 3.8–5.2)
SAMPLES BEING HELD,HOLD: NORMAL
SODIUM SERPL-SCNC: 139 MMOL/L (ref 136–145)
WBC # BLD AUTO: 11.8 K/UL (ref 3.6–11)

## 2022-06-15 PROCEDURE — 76705 ECHO EXAM OF ABDOMEN: CPT

## 2022-06-15 PROCEDURE — 83690 ASSAY OF LIPASE: CPT

## 2022-06-15 PROCEDURE — 36415 COLL VENOUS BLD VENIPUNCTURE: CPT

## 2022-06-15 PROCEDURE — 85025 COMPLETE CBC W/AUTO DIFF WBC: CPT

## 2022-06-15 PROCEDURE — 99285 EMERGENCY DEPT VISIT HI MDM: CPT

## 2022-06-15 PROCEDURE — 80053 COMPREHEN METABOLIC PANEL: CPT

## 2022-06-16 ENCOUNTER — APPOINTMENT (OUTPATIENT)
Dept: CT IMAGING | Age: 42
End: 2022-06-16
Attending: PHYSICIAN ASSISTANT
Payer: COMMERCIAL

## 2022-06-16 VITALS
DIASTOLIC BLOOD PRESSURE: 77 MMHG | TEMPERATURE: 98.3 F | SYSTOLIC BLOOD PRESSURE: 163 MMHG | RESPIRATION RATE: 18 BRPM | OXYGEN SATURATION: 97 % | HEART RATE: 99 BPM

## 2022-06-16 LAB — HCG UR QL: NEGATIVE

## 2022-06-16 PROCEDURE — 81025 URINE PREGNANCY TEST: CPT

## 2022-06-16 PROCEDURE — 74011000636 HC RX REV CODE- 636: Performed by: RADIOLOGY

## 2022-06-16 PROCEDURE — 74177 CT ABD & PELVIS W/CONTRAST: CPT

## 2022-06-16 PROCEDURE — 74011250636 HC RX REV CODE- 250/636: Performed by: PHYSICIAN ASSISTANT

## 2022-06-16 RX ORDER — SUCRALFATE 1 G/1
1 TABLET ORAL 4 TIMES DAILY
Qty: 56 TABLET | Refills: 0 | Status: SHIPPED | OUTPATIENT
Start: 2022-06-16 | End: 2022-06-30

## 2022-06-16 RX ORDER — ONDANSETRON 4 MG/1
4 TABLET, ORALLY DISINTEGRATING ORAL
Qty: 10 TABLET | Refills: 0 | Status: SHIPPED | OUTPATIENT
Start: 2022-06-16 | End: 2022-09-19

## 2022-06-16 RX ADMIN — SODIUM CHLORIDE 1000 ML: 9 INJECTION, SOLUTION INTRAVENOUS at 01:20

## 2022-06-16 RX ADMIN — IOPAMIDOL 100 ML: 755 INJECTION, SOLUTION INTRAVENOUS at 01:06

## 2022-06-16 NOTE — ED PROVIDER NOTES
38year old F presenting to the ED for abd pain. Started intermittent, notes that she would have abd pain after eating. Within the last month the pain has been worse, will sometimes vomit.  + early satiety. Pain in the epigastrium. Pt notes that pain is only post prandial, sometimes immediately after, sometimes 10-15 minutes. Lasts for about an hour. Notes that now she cannot keep anything down.  + chills. No fever. No diarrheal constipation, melena, hematochezia. No UTI symptoms. Worse with eating meats, sometimes something cold. Does have some reflux symptoms. Takes omeprazole daily. Has never seen GI in the past.  Scheduled to see Dr. Jeannette Celestin . Denies CP or SOB. PMHx: Type II DM, HTN  PSx: appdndectomy,  x 1  Social: non-smoker. No alcohol or drugs. .   Janessa MAYA           Past Medical History:   Diagnosis Date    BMI 50.0-59.9, adult ([facility] 2016    Diabetes mellitus type II        Past Surgical History:   Procedure Laterality Date    HX APPENDECTOMY  16    Deb Lopez    HX  SECTION           Family History:   Problem Relation Age of Onset    Diabetes Paternal Grandmother     Diabetes Maternal Grandfather        Social History     Socioeconomic History    Marital status:      Spouse name: Not on file    Number of children: Not on file    Years of education: Not on file    Highest education level: Not on file   Occupational History    Not on file   Tobacco Use    Smoking status: Never Smoker    Smokeless tobacco: Never Used   Substance and Sexual Activity    Alcohol use: No    Drug use: No    Sexual activity: Not on file   Other Topics Concern    Not on file   Social History Narrative    Not on file     Social Determinants of Health     Financial Resource Strain:     Difficulty of Paying Living Expenses: Not on file   Food Insecurity:     Worried About Running Out of Food in the Last Year: Not on file    Tsering of Food in the Last Year: Not on file   Transportation Needs:     Lack of Transportation (Medical): Not on file    Lack of Transportation (Non-Medical): Not on file   Physical Activity:     Days of Exercise per Week: Not on file    Minutes of Exercise per Session: Not on file   Stress:     Feeling of Stress : Not on file   Social Connections:     Frequency of Communication with Friends and Family: Not on file    Frequency of Social Gatherings with Friends and Family: Not on file    Attends Oriental orthodox Services: Not on file    Active Member of [address] [place] [address] [employer] or Organizations: Not on file    Attends Club or Organization Meetings: Not on file    Marital Status: Not on file   Intimate Partner Violence:     Fear of Current or Ex-Partner: Not on file    Emotionally Abused: Not on file    Physically Abused: Not on file    Sexually Abused: Not on file   Housing Stability:     Unable to Pay for Housing in the Last Year: Not on file    Number of [place] in the Last Year: Not on file    Unstable Housing in the Last Year: Not on file         ALLERGIES: Amoxicillin, Celexa [citalopram], and Penicillins    Review of Systems   Constitutional: Negative for fever. HENT: Negative for facial swelling. Eyes: Negative for visual disturbance. Respiratory: Negative for shortness of breath. Cardiovascular: Negative for chest pain. Gastrointestinal: Positive for abdominal pain, nausea and vomiting. Skin: Negative for wound. Neurological: Negative for syncope. All other systems reviewed and are negative. Vitals:    06/15/22 2239   BP: (!) 201/99   Pulse: (!) 105   Resp: 20   Temp: 98.5 °F (36.9 °C)   SpO2: 98%            Physical Exam  Vitals and nursing note reviewed. Constitutional:       General: She is not in acute distress. Appearance: She is well-developed. Comments: Pleasant, well-appearing, no distress   HENT:      Head: Normocephalic and atraumatic.       Right Ear: External ear normal. Left Ear: External ear normal.   Eyes:      General: No scleral icterus. Conjunctiva/sclera: Conjunctivae normal.   Neck:      Trachea: No tracheal deviation. Cardiovascular:      Rate and Rhythm: Normal rate and regular rhythm. Heart sounds: Normal heart sounds. No murmur heard. No friction rub. No gallop. Pulmonary:      Effort: Pulmonary effort is normal. No respiratory distress. Breath sounds: Normal breath sounds. No stridor. No wheezing. Abdominal:      General: There is no distension. Palpations: Abdomen is soft. Tenderness: There is abdominal tenderness in the epigastric area. Musculoskeletal:         General: Normal range of motion. Cervical back: Neck supple. Skin:     General: Skin is warm and dry. Neurological:      Mental Status: She is alert and oriented to person, place, and time. Psychiatric:         Behavior: Behavior normal.          MDM  Number of Diagnoses or Management Options  Diagnosis management comments: 46-year-old female presenting to the ED for progressively worsening postprandial epigastric pain as well as early satiety. No weight loss, melena, fever. Mild tenderness on exam.  Initial labs overall reassuring, ultrasound normal, CT scan ordered to eval for possible mass, etc., also normal.  Discussed with patient possible causes of symptoms that would not be evident on ED work-up such as gastritis, PUD, gastroparesis, etc.  We will add Carafate to daily Prilosec, prescribed antiemetic as needed, encouraged continued follow-up with GI.        Amount and/or Complexity of Data Reviewed  Clinical lab tests: ordered and reviewed  Tests in the radiology section of CPT®: ordered and reviewed  Discuss the patient with other providers: yes (Dr. Jackie Grullon, ED attending)           Procedures

## 2022-06-16 NOTE — ED TRIAGE NOTES
Pt arrives ambulatory from home for abdominal pain that has been intermittent and ongoing for the past 3 months. States she has been trying to get in and see a GI doctor but the earliest appt she could get is August and her pain is getting worse, so she came in today. Reports few episodes of nausea. No constipation, diarrhea, dysuria. States the pain gets worse after she eats. A&Ox4.

## 2022-07-23 ENCOUNTER — HOSPITAL ENCOUNTER (EMERGENCY)
Age: 42
Discharge: HOME | End: 2022-07-24
Attending: EMERGENCY MEDICINE
Payer: COMMERCIAL

## 2022-07-23 DIAGNOSIS — R10.13 ABDOMINAL PAIN, EPIGASTRIC: ICD-10-CM

## 2022-07-23 DIAGNOSIS — K29.00 ACUTE GASTRITIS, PRESENCE OF BLEEDING UNSPECIFIED, UNSPECIFIED GASTRITIS TYPE: Primary | ICD-10-CM

## 2022-07-23 LAB
ALBUMIN SERPL-MCNC: 3 G/DL (ref 3.5–5)
ALBUMIN/GLOB SERPL: 0.7 {RATIO} (ref 1.1–2.2)
ALP SERPL-CCNC: 88 U/L (ref 45–117)
ALT SERPL-CCNC: 41 U/L (ref 12–78)
ANION GAP SERPL CALC-SCNC: 5 MMOL/L (ref 5–15)
APPEARANCE UR: CLEAR
AST SERPL-CCNC: 20 U/L (ref 15–37)
BACTERIA URNS QL MICRO: NEGATIVE /HPF
BASOPHILS # BLD: 0 K/UL (ref 0–0.1)
BASOPHILS NFR BLD: 0 % (ref 0–1)
BILIRUB SERPL-MCNC: 0.6 MG/DL (ref 0.2–1)
BILIRUB UR QL: NEGATIVE
BUN SERPL-MCNC: 26 MG/DL (ref 6–20)
BUN/CREAT SERPL: 15 (ref 12–20)
CALCIUM SERPL-MCNC: 9.3 MG/DL (ref 8.5–10.1)
CHLORIDE SERPL-SCNC: 103 MMOL/L (ref 97–108)
CO2 SERPL-SCNC: 26 MMOL/L (ref 21–32)
COLOR UR: ABNORMAL
CREAT SERPL-MCNC: 1.72 MG/DL (ref 0.55–1.02)
DIFFERENTIAL METHOD BLD: ABNORMAL
EOSINOPHIL # BLD: 0.2 K/UL (ref 0–0.4)
EOSINOPHIL NFR BLD: 2 % (ref 0–7)
EPITH CASTS URNS QL MICRO: ABNORMAL /LPF
ERYTHROCYTE [DISTWIDTH] IN BLOOD BY AUTOMATED COUNT: 14.6 % (ref 11.5–14.5)
GLOBULIN SER CALC-MCNC: 4.6 G/DL (ref 2–4)
GLUCOSE SERPL-MCNC: 248 MG/DL (ref 65–100)
GLUCOSE UR STRIP.AUTO-MCNC: NEGATIVE MG/DL
HCT VFR BLD AUTO: 31.5 % (ref 35–47)
HGB BLD-MCNC: 10.1 G/DL (ref 11.5–16)
HGB UR QL STRIP: ABNORMAL
IMM GRANULOCYTES # BLD AUTO: 0.1 K/UL (ref 0–0.04)
IMM GRANULOCYTES NFR BLD AUTO: 1 % (ref 0–0.5)
KETONES UR QL STRIP.AUTO: ABNORMAL MG/DL
LEUKOCYTE ESTERASE UR QL STRIP.AUTO: NEGATIVE
LIPASE SERPL-CCNC: 247 U/L (ref 73–393)
LYMPHOCYTES # BLD: 2.6 K/UL (ref 0.8–3.5)
LYMPHOCYTES NFR BLD: 19 % (ref 12–49)
MCH RBC QN AUTO: 27.2 PG (ref 26–34)
MCHC RBC AUTO-ENTMCNC: 32.1 G/DL (ref 30–36.5)
MCV RBC AUTO: 84.7 FL (ref 80–99)
MONOCYTES # BLD: 0.6 K/UL (ref 0–1)
MONOCYTES NFR BLD: 4 % (ref 5–13)
MUCOUS THREADS URNS QL MICRO: ABNORMAL /LPF
NEUTS SEG # BLD: 10.3 K/UL (ref 1.8–8)
NEUTS SEG NFR BLD: 74 % (ref 32–75)
NITRITE UR QL STRIP.AUTO: NEGATIVE
NRBC # BLD: 0 K/UL (ref 0–0.01)
NRBC BLD-RTO: 0 PER 100 WBC
PH UR STRIP: 5.5 [PH] (ref 5–8)
PLATELET # BLD AUTO: 321 K/UL (ref 150–400)
PMV BLD AUTO: 9.3 FL (ref 8.9–12.9)
POTASSIUM SERPL-SCNC: 4.7 MMOL/L (ref 3.5–5.1)
PROT SERPL-MCNC: 7.6 G/DL (ref 6.4–8.2)
PROT UR STRIP-MCNC: 100 MG/DL
RBC # BLD AUTO: 3.72 M/UL (ref 3.8–5.2)
RBC #/AREA URNS HPF: ABNORMAL /HPF (ref 0–5)
SODIUM SERPL-SCNC: 134 MMOL/L (ref 136–145)
SP GR UR REFRACTOMETRY: 1.02
UA: UC IF INDICATED,UAUC: ABNORMAL
UROBILINOGEN UR QL STRIP.AUTO: 1 EU/DL (ref 0.2–1)
WBC # BLD AUTO: 13.8 K/UL (ref 3.6–11)
WBC URNS QL MICRO: ABNORMAL /HPF (ref 0–4)

## 2022-07-23 PROCEDURE — 83690 ASSAY OF LIPASE: CPT

## 2022-07-23 PROCEDURE — 99284 EMERGENCY DEPT VISIT MOD MDM: CPT

## 2022-07-23 PROCEDURE — 36415 COLL VENOUS BLD VENIPUNCTURE: CPT

## 2022-07-23 PROCEDURE — 80053 COMPREHEN METABOLIC PANEL: CPT

## 2022-07-23 PROCEDURE — 85025 COMPLETE CBC W/AUTO DIFF WBC: CPT

## 2022-07-23 PROCEDURE — 81001 URINALYSIS AUTO W/SCOPE: CPT

## 2022-07-24 ENCOUNTER — APPOINTMENT (OUTPATIENT)
Dept: ULTRASOUND IMAGING | Age: 42
End: 2022-07-24
Attending: EMERGENCY MEDICINE
Payer: COMMERCIAL

## 2022-07-24 VITALS
SYSTOLIC BLOOD PRESSURE: 146 MMHG | HEIGHT: 62 IN | DIASTOLIC BLOOD PRESSURE: 68 MMHG | BODY MASS INDEX: 53.92 KG/M2 | WEIGHT: 293 LBS | RESPIRATION RATE: 18 BRPM | TEMPERATURE: 98.4 F | HEART RATE: 86 BPM | OXYGEN SATURATION: 99 %

## 2022-07-24 PROCEDURE — 74011000250 HC RX REV CODE- 250: Performed by: EMERGENCY MEDICINE

## 2022-07-24 PROCEDURE — 74011250637 HC RX REV CODE- 250/637: Performed by: EMERGENCY MEDICINE

## 2022-07-24 PROCEDURE — 96374 THER/PROPH/DIAG INJ IV PUSH: CPT

## 2022-07-24 PROCEDURE — 74011250636 HC RX REV CODE- 250/636: Performed by: EMERGENCY MEDICINE

## 2022-07-24 PROCEDURE — C9113 INJ PANTOPRAZOLE SODIUM, VIA: HCPCS | Performed by: EMERGENCY MEDICINE

## 2022-07-24 PROCEDURE — 76705 ECHO EXAM OF ABDOMEN: CPT

## 2022-07-24 RX ORDER — PANTOPRAZOLE SODIUM 40 MG/1
40 TABLET, DELAYED RELEASE ORAL DAILY
Qty: 20 TABLET | Refills: 0 | Status: SHIPPED | OUTPATIENT
Start: 2022-07-24 | End: 2022-08-13

## 2022-07-24 RX ADMIN — ALUMINUM HYDROXIDE AND MAGNESIUM HYDROXIDE 40 ML: 200; 200 SUSPENSION ORAL at 02:57

## 2022-07-24 RX ADMIN — SODIUM CHLORIDE 40 MG: 9 INJECTION, SOLUTION INTRAMUSCULAR; INTRAVENOUS; SUBCUTANEOUS at 02:56

## 2022-07-24 NOTE — ED NOTES
Patient discharged by Donovan Flores MD  - pt sent to the front lobby, with strong and steady gait, no acute distress noted at time of discharge - Discharge information / home RX / and reasons to return to the ED were reviewed by the ED provider.

## 2022-07-24 NOTE — ED TRIAGE NOTES
.  Chief Complaint   Patient presents with    Abdominal Pain     Pt presents with epigastric abdominal pain that has been ongoing for some time over the last month off and on. Pt states pain worsens after oral in take. Pt has had N/V. Pt recently seen at [place] [address] and treated for her \"gall bladder. \" Rates pain 8/10.

## 2022-07-24 NOTE — DISCHARGE INSTRUCTIONS
It was a pleasure taking care of you in our Emergency Department today. We know that when you come to [facility], you are entrusting us with your health, comfort, and safety. Our physicians and nurses honor that trust, and truly appreciate the opportunity to care for you and your loved ones. We also value your feedback. If you receive a survey about your Emergency Department experience today, please fill it out. We care about our patients' feedback, and we listen to what you have to say.   Thank you!       --- Dr. Tammi Lindo MD

## 2022-07-24 NOTE — ED PROVIDER NOTES
EMERGENCY DEPARTMENT HISTORY AND PHYSICAL EXAM    Please note that this dictation was completed with [employer], the computer voice recognition software. Quite often unanticipated grammatical, syntax, homophones, and other interpretive errors are inadvertently transcribed by the computer software. Please disregard these errors. Please excuse any errors that have escaped final proofreading. Date: 7/23/2022  Patient Name: Damon Castillo  Patient Age and Sex: 38 y.o. female    History of Presenting Illness     Chief Complaint   Patient presents with    Abdominal Pain     Pt presents with epigastric abdominal pain that has been ongoing for some time over the last month off and on. Pt states pain worsens after oral in take. Pt has had N/V. Pt recently seen at [facility] and treated for her \"gall bladder. \" Rates pain 8/10. History Provided By: Patient     Chief Complaint: abd pain      HPI: Damon Castillo, is a 38 y.o. female who presents to the ED with postprandial non-radiating upper abd pain at times assocaited with nausea and few episodes of non-bloody vomiting. Symptoms have been ongoing since April but episodes have been more frequent over the past week. Last episode was this evening after dinner. She describes it as a burnig sensation that starts in epigastrium and radiates to her esophagus and throat. Often has increased belching with these episodes. No fever, chills. Normal bowel movements. No cp, sob, cough. Has not vomitted tonight. Pt denies any other alleviating or exacerbating factors. No other associated signs or symptoms. There are no other complaints, changes or physical findings at this time.      PCP: Alyssa Srivastava MD    Past History   All documented elements of the [address] reviewed and verified by me. -Sloan Elder MD    Past Medical History:  Past Medical History:   Diagnosis Date    BMI 50.0-59.9, adult ([facility] 5/23/2016    Diabetes mellitus type II        Past Surgical History:  Past Surgical History:   Procedure Laterality Date    HX APPENDECTOMY  16    Gina Neville    HX  SECTION         Family History:   Family History   Problem Relation Age of Onset    Diabetes Paternal Grandmother     Diabetes Maternal Grandfather        Social History:  Social History     Tobacco Use    Smoking status: Never    Smokeless tobacco: Never   Substance Use Topics    Alcohol use: No    Drug use: No       Current Medications:  No current facility-administered medications on file prior to encounter. Current Outpatient Medications on File Prior to Encounter   Medication Sig Dispense Refill    ondansetron (ZOFRAN ODT) 4 mg disintegrating tablet Take 1 Tablet by mouth every eight (8) hours as needed for Nausea or Vomiting. 10 Tablet 0    glucose blood VI test strips (OneTouch Verio test strips) strip Test blood sugars 3 times daily 300 Strip 3    furosemide (LASIX) 20 mg tablet TAKE 1 TABLET BY MOUTH EVERY DAY 30 Tablet 1    insulin glargine (Lantus Solostar U-100 Insulin) 100 unit/mL (3 mL) inpn INJECT 100 UNITS SUBCUTANEOUSLY ROUTE DAILY 30 mL 10    FreeStyle Reji 14 Day Sensor kit CHANGE 1 SENSOR EVERY 14 DAYS 2 Kit 11    dulaglutide (Trulicity) 8.27 VB/9.5 mL sub-q pen 0.5 mL by SubCUTAneous route every seven (7) days. 12 Syringe 3    insulin lispro (HumaLOG KwikPen Insulin) 200 unit/mL (3 mL) inpn INJECT 20-40 UNITS THREE TIMES A DAY BEFORE MEALS 90 mL 3    losartan (COZAAR) 50 mg tablet Take 1 Tablet by mouth two (2) times a day. 180 Tablet 3    montelukast (SINGULAIR) 10 mg tablet TAKE 1 TABLET BY MOUTH EVERY DAY      Insulin Needles, Disposable, (Trina Pen Needle) 32 gauge x 5/32\" ndle For insulin up to 5 times daily 200 Pen Needle 3    omeprazole (PRILOSEC) 40 mg capsule Take 20 mg by mouth daily. 4    Blood-Glucose Meter (ACCU-CHEK SHOSHANA CONNECT METER) misc by Does Not Apply route.  Checks Blood sugar once a month      glucose blood VI test strips (ACCU-CHEK SHOSHANA PLUS TEST STRP) strip by Does Not Apply route See Admin Instructions. Insulin Needles, Disposable, (PAMELLA PEN NEEDLE) 32 gauge x 5/32\" ndle 4 times daily 150 Pen Needle 11    Insulin Syringe-Needle U-100 (BD INSULIN SYRINGE ULT-FINE II) 1 mL 31 x 5/16\" Syrg 1 Each by Does Not Apply route three (3) times daily. 100 Syringe 10    Lancets (ONE TOUCH DELICA) Misc by Does Not Apply route three (3) times daily. 100 Each 11       Allergies: Allergies   Allergen Reactions    Amoxicillin Angioedema    Celexa [Citalopram] Anxiety    Penicillins Angioedema       Review of Systems   All other systems reviewed and negative    Review of Systems   Constitutional:  Negative for fever. HENT: Negative. Eyes: Negative. Respiratory:  Negative for cough and shortness of breath. Cardiovascular:  Negative for chest pain and palpitations. Gastrointestinal:  Positive for abdominal pain, nausea and vomiting. Negative for diarrhea. Endocrine: Negative. Genitourinary:  Negative for dysuria, flank pain and hematuria. Musculoskeletal:  Negative for joint swelling. Skin:  Negative for rash. Neurological:  Negative for weakness and numbness. Psychiatric/Behavioral: Negative. Negative for confusion. All other systems reviewed and are negative. Physical Exam   Reviewed patients vital signs and nursing note    Physical Exam  Vitals and nursing note reviewed. Constitutional:       Appearance: She is not diaphoretic. HENT:      Head: Atraumatic. Nose: Nose normal.      Mouth/Throat:      Mouth: Mucous membranes are moist.   Eyes:      Extraocular Movements: Extraocular movements intact. Conjunctiva/sclera: Conjunctivae normal.      Pupils: Pupils are equal, round, and reactive to light. Cardiovascular:      Rate and Rhythm: Normal rate and regular rhythm. Pulses: Normal pulses. Heart sounds: Normal heart sounds. Pulmonary:      Effort: Pulmonary effort is normal.      Breath sounds: Normal breath sounds. Abdominal:      General: Bowel sounds are normal. There is no distension. Palpations: Abdomen is soft. Tenderness: There is abdominal tenderness in the epigastric area. There is no right CVA tenderness, left CVA tenderness, guarding or rebound. Negative signs include Carvalho's sign. Hernia: No hernia is present. Musculoskeletal:         General: No tenderness. Normal range of motion. Cervical back: Normal range of motion. No rigidity. Skin:     General: Skin is warm and dry. Capillary Refill: Capillary refill takes less than 2 seconds. Neurological:      General: No focal deficit present. Mental Status: She is alert. Psychiatric:         Mood and Affect: Mood normal.         Behavior: Behavior normal.       Diagnostic Study Results     Labs - I have personally reviewed and interpreted all laboratory results. Interpretation of available and pertinent results detailed below in MDM. Paul Gray MD, MSc  Recent Results (from the past 24 hour(s))   CBC WITH AUTOMATED DIFF    Collection Time: 07/23/22 10:30 PM   Result Value Ref Range    WBC 13.8 (H) 3.6 - 11.0 K/uL    RBC 3.72 (L) 3.80 - 5.20 M/uL    HGB 10.1 (L) 11.5 - 16.0 g/dL    HCT 31.5 (L) 35.0 - 47.0 %    MCV 84.7 80.0 - 99.0 FL    MCH 27.2 26.0 - 34.0 PG    MCHC 32.1 30.0 - 36.5 g/dL    RDW 14.6 (H) 11.5 - 14.5 %    PLATELET 217 855 - 065 K/uL    MPV 9.3 8.9 - 12.9 FL    NRBC 0.0 0  WBC    ABSOLUTE NRBC 0.00 0.00 - 0.01 K/uL    NEUTROPHILS 74 32 - 75 %    LYMPHOCYTES 19 12 - 49 %    MONOCYTES 4 (L) 5 - 13 %    EOSINOPHILS 2 0 - 7 %    BASOPHILS 0 0 - 1 %    IMMATURE GRANULOCYTES 1 (H) 0.0 - 0.5 %    ABS. NEUTROPHILS 10.3 (H) 1.8 - 8.0 K/UL    ABS. LYMPHOCYTES 2.6 0.8 - 3.5 K/UL    ABS. MONOCYTES 0.6 0.0 - 1.0 K/UL    ABS. EOSINOPHILS 0.2 0.0 - 0.4 K/UL    ABS. BASOPHILS 0.0 0.0 - 0.1 K/UL    ABS. IMM.  GRANS. 0.1 (H) 0.00 - 0.04 K/UL    DF AUTOMATED     METABOLIC PANEL, COMPREHENSIVE    Collection Time: 07/23/22 10:30 PM   Result Value Ref Range    Sodium 134 (L) 136 - 145 mmol/L    Potassium 4.7 3.5 - 5.1 mmol/L    Chloride 103 97 - 108 mmol/L    CO2 26 21 - 32 mmol/L    Anion gap 5 5 - 15 mmol/L    Glucose 248 (H) 65 - 100 mg/dL    BUN 26 (H) 6 - 20 MG/DL    Creatinine 1.72 (H) 0.55 - 1.02 MG/DL    BUN/Creatinine ratio 15 12 - 20      GFR est AA 40 (L) >60 ml/min/1.73m2    GFR est non-AA 33 (L) >60 ml/min/1.73m2    Calcium 9.3 8.5 - 10.1 MG/DL    Bilirubin, total 0.6 0.2 - 1.0 MG/DL    ALT (SGPT) 41 12 - 78 U/L    AST (SGOT) 20 15 - 37 U/L    Alk. phosphatase 88 45 - 117 U/L    Protein, total 7.6 6.4 - 8.2 g/dL    Albumin 3.0 (L) 3.5 - 5.0 g/dL    Globulin 4.6 (H) 2.0 - 4.0 g/dL    A-G Ratio 0.7 (L) 1.1 - 2.2     LIPASE    Collection Time: 07/23/22 10:30 PM   Result Value Ref Range    Lipase 247 73 - 393 U/L   URINALYSIS W/ REFLEX CULTURE    Collection Time: 07/23/22 10:42 PM    Specimen: Urine   Result Value Ref Range    Color YELLOW/STRAW      Appearance CLEAR CLEAR      Specific gravity 1.022      pH (UA) 5.5 5.0 - 8.0      Protein 100 (A) NEG mg/dL    Glucose Negative NEG mg/dL    Ketone TRACE (A) NEG mg/dL    Bilirubin Negative NEG      Blood TRACE (A) NEG      Urobilinogen 1.0 0.2 - 1.0 EU/dL    Nitrites Negative NEG      Leukocyte Esterase Negative NEG      WBC 0-4 0 - 4 /hpf    RBC 0-5 0 - 5 /hpf    Epithelial cells FEW FEW /lpf    Bacteria Negative NEG /hpf    UA:UC IF INDICATED CULTURE NOT INDICATED BY UA RESULT CNI      Mucus 1+ (A) NEG /lpf       Radiologic Studies - I have personally reviewed and interpreted (see MDM for brief interpreation of available results) all imaging studies and agree with radiology interpretation and report. Dillan Almeida MD, MSc  [facility]   Final Result   Some tenderness was noted in the gallbladder region, but the gallbladder is   normal, with no evidence of cholecystolithiasis or acute cholecystitis.             Medical Decision Making   I am the first provider for this patient. Records Reviewed:   I reviewed our electronic medical record system for any past medical records that were available that may contribute to the patient's current condition, including their PMH, surgical history, social and family history. This includes most recent ED visits, any available discharge summaries and prior ECGs, which I have reviewed and interpreted personally. I have summarized most salient findings in my HPI and MDM. Елена Nash MD, MSc    I also reviewed the nursing notes and vital signs from today's visit. Nursing notes will be reviewed as they become available in realtime while the pt has been in the ED. Елена Nash MD, MSc      Vital Signs-Reviewed the patient's vital signs. Patient Vitals for the past 24 hrs:   Temp Pulse Resp BP SpO2   07/24/22 0201 -- -- -- -- 93 %   07/24/22 0101 -- -- -- 139/77 92 %   07/24/22 0001 98.4 °F (36.9 °C) 91 18 (!) 154/77 98 %   07/23/22 2216 98.3 °F (36.8 °C) 98 20 (!) 181/81 98 %         Provider Notes (Medical Decision Making):   Patient presents with epigastric abdominal pain. Differential includes gastritis, pancreatitis, cholelithiasis, cholecystitis, hepatitis, muscular strain, renal pathology, gastroenteritis. Less likely ACS based on history and nature of pain episodes. Will obtain labs and possibly US. Will give analgesics and antiemetics PRN. ED Course:   Initial assessment performed. The patients presenting problems have been discussed, and they are in agreement with the care plan formulated and outlined with them. I have encouraged them to ask questions as they arise throughout their visit. Progress note:  Patient has been reassessed and reports feeling considerably better, has normal vital signs and feels comfortable going home. I think this is reasonable as no findings today suggest a life-threatening condition.      DISPOSITION: DISCHARGE  The patient's results have been reviewed with patient and available family and/or caregiver. They verbally convey their understanding and agreement of the patient's signs, symptoms, diagnosis, treatment and prognosis and additionally agree to follow up as recommended in the discharge instructions or to return to the Emergency Department should the patient's condition change prior to their follow-up appointment. The patient and available family and/or caregiver verbally agree with the care plan and all of their questions have been answered. The discharge instructions have also been provided to the them with educational information regarding the patient's diagnosis as well a list of reasons why the patient would want to return to the ER prior to their follow-up appointment should any concerns arise, the patient's condition change or symptoms worsen. Katie Brown MD, Msc    PLAN:  Discharge Medication List as of 7/24/2022  2:58 AM        START taking these medications    Details   pantoprazole (Protonix) 40 mg tablet Take 1 Tablet by mouth in the morning for 20 days. , Normal, Disp-20 Tablet, R-0           CONTINUE these medications which have NOT CHANGED    Details   ondansetron (ZOFRAN ODT) 4 mg disintegrating tablet Take 1 Tablet by mouth every eight (8) hours as needed for Nausea or Vomiting., Normal, Disp-10 Tablet, R-0      !! glucose blood VI test strips (OneTouch Verio test strips) strip Test blood sugars 3 times daily, Normal, Disp-300 Strip, R-3      furosemide (LASIX) 20 mg tablet TAKE 1 TABLET BY MOUTH EVERY DAY, Normal, Disp-30 Tablet, R-1      insulin glargine (Lantus Solostar U-100 Insulin) 100 unit/mL (3 mL) inpn INJECT 100 UNITS SUBCUTANEOUSLY ROUTE DAILY, Normal, Disp-30 mL, R-10      FreeStyle Reji 14 Day Sensor kit CHANGE 1 SENSOR EVERY 14 DAYS, Normal, Disp-2 Kit, R-11, TRUDI      dulaglutide (Trulicity) 4.15 QQ/5.9 mL sub-q pen 0.5 mL by SubCUTAneous route every seven (7) days. , Normal, Disp-12 Syringe, R-3      insulin lispro (HumaLOG KwikPen Insulin) 200 unit/mL (3 mL) inpn INJECT 20-40 UNITS THREE TIMES A DAY BEFORE MEALS, Normal, Disp-90 mL, R-3      losartan (COZAAR) 50 mg tablet Take 1 Tablet by mouth two (2) times a day., Normal, Disp-180 Tablet, R-3Correction to prior order. Take twice daily. montelukast (SINGULAIR) 10 mg tablet TAKE 1 TABLET BY MOUTH EVERY DAY, Historical Med      !! Insulin Needles, Disposable, (Pamella Pen Needle) 32 gauge x 5/32\" ndle For insulin up to 5 times daily, Normal, Disp-200 Pen Needle, R-3      omeprazole (PRILOSEC) 40 mg capsule Take 20 mg by mouth daily. , Historical Med, R-4      Blood-Glucose Meter (ACCU-CHEK SHOSHANA CONNECT METER) misc by Does Not Apply route. Checks Blood sugar once a month, Historical Med      !! glucose blood VI test strips (ACCU-CHEK SHOSHANA PLUS TEST STRP) strip by Does Not Apply route See Admin Instructions. , Historical Med      !! Insulin Needles, Disposable, (PAMELLA PEN NEEDLE) 32 gauge x 5/32\" ndle 4 times daily, Normal, Disp-150 Pen Needle, R-11      Insulin Syringe-Needle U-100 (BD INSULIN SYRINGE ULT-FINE II) 1 mL 31 x 5/16\" Syrg 1 Each by Does Not Apply route three (3) times daily. , Normal, Disp-100 Syringe, R-10      Lancets (ONE TOUCH DELICA) Misc by Does Not Apply route three (3) times daily. Normal, Disp-100 Each, R-11       !! - Potential duplicate medications found. Please discuss with provider. 2.     Follow-up Information       Follow up With Specialties Details Why Contact Info    gastroenterology clinic  Go to  on august 3rd as scheduled     [facility] EMERGENCY DEPT Emergency Medicine Go to  As needed, If symptoms worsen [address] [place] [address]  [phone]          3. Return to ED if worse       I, Eugene Hoffmann MD, am the attending of record for this patient encounter. Diagnosis     Clinical Impression:   1. Acute gastritis, presence of bleeding unspecified, unspecified gastritis type    2.  Abdominal pain, epigastric        Attestation:  I personally performed the services described in this documentation on this date 7/23/2022 for patient Amber Gautam.   Jm Venegas MD

## 2022-08-04 ENCOUNTER — TRANSCRIBE ORDER (OUTPATIENT)
Dept: SCHEDULING | Age: 42
End: 2022-08-04

## 2022-08-04 DIAGNOSIS — E66.9 OBESITY, UNSPECIFIED: ICD-10-CM

## 2022-08-04 DIAGNOSIS — D64.9 ANEMIA, UNSPECIFIED: ICD-10-CM

## 2022-08-04 DIAGNOSIS — E11.8 TYPE 2 DIABETES MELLITUS WITH UNSPECIFIED COMPLICATIONS (HCC): ICD-10-CM

## 2022-08-04 DIAGNOSIS — R11.2 NAUSEA AND VOMITING: Primary | ICD-10-CM

## 2022-08-10 ENCOUNTER — TRANSCRIBE ORDER (OUTPATIENT)
Dept: SCHEDULING | Age: 42
End: 2022-08-10

## 2022-08-10 DIAGNOSIS — D64.9 ANEMIA, UNSPECIFIED: Primary | ICD-10-CM

## 2022-08-10 DIAGNOSIS — E66.9 OBESITY, UNSPECIFIED: ICD-10-CM

## 2022-08-10 DIAGNOSIS — E11.8 TYPE 2 DIABETES MELLITUS WITH UNSPECIFIED COMPLICATIONS (HCC): ICD-10-CM

## 2022-08-10 DIAGNOSIS — R11.2 NAUSEA AND VOMITING: ICD-10-CM

## 2022-08-15 ENCOUNTER — HOSPITAL ENCOUNTER (OUTPATIENT)
Dept: NUCLEAR MEDICINE | Age: 42
Discharge: HOME | End: 2022-08-15
Attending: PHYSICIAN ASSISTANT
Payer: COMMERCIAL

## 2022-08-15 DIAGNOSIS — E11.8 TYPE 2 DIABETES MELLITUS WITH UNSPECIFIED COMPLICATIONS (HCC): ICD-10-CM

## 2022-08-15 DIAGNOSIS — E66.9 OBESITY, UNSPECIFIED: ICD-10-CM

## 2022-08-15 DIAGNOSIS — R11.2 NAUSEA AND VOMITING: ICD-10-CM

## 2022-08-15 DIAGNOSIS — D64.9 ANEMIA, UNSPECIFIED: ICD-10-CM

## 2022-08-15 PROCEDURE — 78226 HEPATOBILIARY SYSTEM IMAGING: CPT

## 2022-08-15 RX ORDER — KIT FOR THE PREPARATION OF TECHNETIUM TC 99M MEBROFENIN 45 MG/10ML
5.2 INJECTION, POWDER, LYOPHILIZED, FOR SOLUTION INTRAVENOUS
Status: COMPLETED | OUTPATIENT
Start: 2022-08-15 | End: 2022-08-15

## 2022-08-15 RX ADMIN — KIT FOR THE PREPARATION OF TECHNETIUM TC 99M MEBROFENIN 5.2 MILLICURIE: 45 INJECTION, POWDER, LYOPHILIZED, FOR SOLUTION INTRAVENOUS at 09:00

## 2022-08-16 PROCEDURE — 96374 THER/PROPH/DIAG INJ IV PUSH: CPT

## 2022-08-16 PROCEDURE — 96375 TX/PRO/DX INJ NEW DRUG ADDON: CPT

## 2022-08-16 PROCEDURE — 99284 EMERGENCY DEPT VISIT MOD MDM: CPT

## 2022-08-17 ENCOUNTER — HOSPITAL ENCOUNTER (EMERGENCY)
Age: 42
Discharge: HOME | End: 2022-08-17
Attending: EMERGENCY MEDICINE
Payer: COMMERCIAL

## 2022-08-17 ENCOUNTER — OFFICE VISIT (OUTPATIENT)
Dept: SURGERY | Age: 42
End: 2022-08-17
Payer: COMMERCIAL

## 2022-08-17 VITALS
OXYGEN SATURATION: 96 % | RESPIRATION RATE: 20 BRPM | HEIGHT: 62 IN | WEIGHT: 286 LBS | SYSTOLIC BLOOD PRESSURE: 127 MMHG | DIASTOLIC BLOOD PRESSURE: 81 MMHG | HEART RATE: 92 BPM | TEMPERATURE: 97.3 F | BODY MASS INDEX: 52.63 KG/M2

## 2022-08-17 VITALS
HEIGHT: 62 IN | OXYGEN SATURATION: 97 % | DIASTOLIC BLOOD PRESSURE: 69 MMHG | BODY MASS INDEX: 53.92 KG/M2 | WEIGHT: 293 LBS | HEART RATE: 88 BPM | RESPIRATION RATE: 12 BRPM | SYSTOLIC BLOOD PRESSURE: 176 MMHG | TEMPERATURE: 97.9 F

## 2022-08-17 DIAGNOSIS — K82.8 DYSFUNCTIONAL GALLBLADDER: Primary | ICD-10-CM

## 2022-08-17 DIAGNOSIS — R03.0 ELEVATED BP WITHOUT DIAGNOSIS OF HYPERTENSION: ICD-10-CM

## 2022-08-17 DIAGNOSIS — K82.8 BILIARY DYSKINESIA: Primary | ICD-10-CM

## 2022-08-17 DIAGNOSIS — R10.11 RUQ ABDOMINAL PAIN: ICD-10-CM

## 2022-08-17 DIAGNOSIS — E11.65 HYPERGLYCEMIA DUE TO DIABETES MELLITUS (HCC): ICD-10-CM

## 2022-08-17 DIAGNOSIS — E11.21 TYPE 2 DIABETES WITH NEPHROPATHY (HCC): ICD-10-CM

## 2022-08-17 LAB
ALBUMIN SERPL-MCNC: 3.4 G/DL (ref 3.5–5)
ALBUMIN/GLOB SERPL: 0.7 {RATIO} (ref 1.1–2.2)
ALP SERPL-CCNC: 92 U/L (ref 45–117)
ALT SERPL-CCNC: 34 U/L (ref 12–78)
ANION GAP SERPL CALC-SCNC: 8 MMOL/L (ref 5–15)
APPEARANCE UR: CLEAR
AST SERPL-CCNC: 20 U/L (ref 15–37)
BACTERIA URNS QL MICRO: NEGATIVE /HPF
BASOPHILS # BLD: 0 K/UL (ref 0–0.1)
BASOPHILS NFR BLD: 0 % (ref 0–1)
BILIRUB SERPL-MCNC: 0.4 MG/DL (ref 0.2–1)
BILIRUB UR QL: NEGATIVE
BUN SERPL-MCNC: 25 MG/DL (ref 6–20)
BUN/CREAT SERPL: 15 (ref 12–20)
CALCIUM SERPL-MCNC: 9.6 MG/DL (ref 8.5–10.1)
CHLORIDE SERPL-SCNC: 100 MMOL/L (ref 97–108)
CO2 SERPL-SCNC: 25 MMOL/L (ref 21–32)
COLOR UR: ABNORMAL
CREAT SERPL-MCNC: 1.65 MG/DL (ref 0.55–1.02)
DIFFERENTIAL METHOD BLD: ABNORMAL
EOSINOPHIL # BLD: 0 K/UL (ref 0–0.4)
EOSINOPHIL NFR BLD: 0 % (ref 0–7)
EPITH CASTS URNS QL MICRO: ABNORMAL /LPF
ERYTHROCYTE [DISTWIDTH] IN BLOOD BY AUTOMATED COUNT: 14.1 % (ref 11.5–14.5)
GLOBULIN SER CALC-MCNC: 4.8 G/DL (ref 2–4)
GLUCOSE SERPL-MCNC: 379 MG/DL (ref 65–100)
GLUCOSE UR STRIP.AUTO-MCNC: >1000 MG/DL
HCT VFR BLD AUTO: 34.1 % (ref 35–47)
HGB BLD-MCNC: 11.2 G/DL (ref 11.5–16)
HGB UR QL STRIP: ABNORMAL
HYALINE CASTS URNS QL MICRO: ABNORMAL /LPF (ref 0–2)
IMM GRANULOCYTES # BLD AUTO: 0 K/UL (ref 0–0.04)
IMM GRANULOCYTES NFR BLD AUTO: 0 % (ref 0–0.5)
KETONES UR QL STRIP.AUTO: 40 MG/DL
LEUKOCYTE ESTERASE UR QL STRIP.AUTO: NEGATIVE
LIPASE SERPL-CCNC: 64 U/L (ref 73–393)
LYMPHOCYTES # BLD: 1 K/UL (ref 0.8–3.5)
LYMPHOCYTES NFR BLD: 7 % (ref 12–49)
MCH RBC QN AUTO: 27.3 PG (ref 26–34)
MCHC RBC AUTO-ENTMCNC: 32.8 G/DL (ref 30–36.5)
MCV RBC AUTO: 83.2 FL (ref 80–99)
MONOCYTES # BLD: 0.3 K/UL (ref 0–1)
MONOCYTES NFR BLD: 2 % (ref 5–13)
NEUTS SEG # BLD: 13.6 K/UL (ref 1.8–8)
NEUTS SEG NFR BLD: 91 % (ref 32–75)
NITRITE UR QL STRIP.AUTO: NEGATIVE
NRBC # BLD: 0 K/UL (ref 0–0.01)
NRBC BLD-RTO: 0 PER 100 WBC
PH UR STRIP: 6.5 [PH] (ref 5–8)
PLATELET # BLD AUTO: 345 K/UL (ref 150–400)
PMV BLD AUTO: 9.7 FL (ref 8.9–12.9)
POTASSIUM SERPL-SCNC: 4.2 MMOL/L (ref 3.5–5.1)
PROT SERPL-MCNC: 8.2 G/DL (ref 6.4–8.2)
PROT UR STRIP-MCNC: 300 MG/DL
RBC # BLD AUTO: 4.1 M/UL (ref 3.8–5.2)
RBC #/AREA URNS HPF: ABNORMAL /HPF (ref 0–5)
RBC MORPH BLD: ABNORMAL
SODIUM SERPL-SCNC: 133 MMOL/L (ref 136–145)
SP GR UR REFRACTOMETRY: 1.03
UA: UC IF INDICATED,UAUC: ABNORMAL
UROBILINOGEN UR QL STRIP.AUTO: 1 EU/DL (ref 0.2–1)
WBC # BLD AUTO: 14.9 K/UL (ref 3.6–11)
WBC URNS QL MICRO: ABNORMAL /HPF (ref 0–4)

## 2022-08-17 PROCEDURE — 80053 COMPREHEN METABOLIC PANEL: CPT

## 2022-08-17 PROCEDURE — 81001 URINALYSIS AUTO W/SCOPE: CPT

## 2022-08-17 PROCEDURE — 83690 ASSAY OF LIPASE: CPT

## 2022-08-17 PROCEDURE — 74011250636 HC RX REV CODE- 250/636

## 2022-08-17 PROCEDURE — 36415 COLL VENOUS BLD VENIPUNCTURE: CPT

## 2022-08-17 PROCEDURE — 85025 COMPLETE CBC W/AUTO DIFF WBC: CPT

## 2022-08-17 PROCEDURE — 99204 OFFICE O/P NEW MOD 45 MIN: CPT | Performed by: SURGERY

## 2022-08-17 PROCEDURE — 74011250636 HC RX REV CODE- 250/636: Performed by: EMERGENCY MEDICINE

## 2022-08-17 RX ORDER — VORTIOXETINE 10 MG/1
10 TABLET, FILM COATED ORAL DAILY
COMMUNITY
Start: 2022-07-05

## 2022-08-17 RX ORDER — MORPHINE SULFATE 2 MG/ML
4 INJECTION, SOLUTION INTRAMUSCULAR; INTRAVENOUS ONCE
Status: COMPLETED | OUTPATIENT
Start: 2022-08-17 | End: 2022-08-17

## 2022-08-17 RX ORDER — HYDROMORPHONE HYDROCHLORIDE 1 MG/ML
INJECTION, SOLUTION INTRAMUSCULAR; INTRAVENOUS; SUBCUTANEOUS
Status: COMPLETED
Start: 2022-08-17 | End: 2022-08-17

## 2022-08-17 RX ORDER — ONDANSETRON 2 MG/ML
8 INJECTION INTRAMUSCULAR; INTRAVENOUS ONCE
Status: COMPLETED | OUTPATIENT
Start: 2022-08-17 | End: 2022-08-17

## 2022-08-17 RX ORDER — PANTOPRAZOLE SODIUM 40 MG/1
40 TABLET, DELAYED RELEASE ORAL DAILY
COMMUNITY
End: 2022-10-28 | Stop reason: SDUPTHER

## 2022-08-17 RX ORDER — HYDROMORPHONE HYDROCHLORIDE 1 MG/ML
0.5 INJECTION, SOLUTION INTRAMUSCULAR; INTRAVENOUS; SUBCUTANEOUS ONCE
Status: COMPLETED | OUTPATIENT
Start: 2022-08-17 | End: 2022-08-17

## 2022-08-17 RX ORDER — SUCRALFATE 1 G/1
1 TABLET ORAL
COMMUNITY
Start: 2022-08-06 | End: 2022-08-22

## 2022-08-17 RX ORDER — SODIUM CHLORIDE 9 MG/ML
1000 INJECTION, SOLUTION INTRAVENOUS ONCE
Status: COMPLETED | OUTPATIENT
Start: 2022-08-17 | End: 2022-08-17

## 2022-08-17 RX ORDER — MORPHINE SULFATE 2 MG/ML
INJECTION, SOLUTION INTRAMUSCULAR; INTRAVENOUS
Status: DISCONTINUED
Start: 2022-08-17 | End: 2022-08-17 | Stop reason: HOSPADM

## 2022-08-17 RX ORDER — METOCLOPRAMIDE 10 MG/1
10 TABLET ORAL
Qty: 12 TABLET | Refills: 0 | Status: SHIPPED | OUTPATIENT
Start: 2022-08-17 | End: 2022-08-17

## 2022-08-17 RX ORDER — DICYCLOMINE HYDROCHLORIDE 10 MG/1
10 CAPSULE ORAL
COMMUNITY
Start: 2022-08-06 | End: 2022-09-19

## 2022-08-17 RX ORDER — ONDANSETRON 2 MG/ML
INJECTION INTRAMUSCULAR; INTRAVENOUS
Status: DISCONTINUED
Start: 2022-08-17 | End: 2022-08-17 | Stop reason: HOSPADM

## 2022-08-17 RX ADMIN — ONDANSETRON HYDROCHLORIDE 8 MG: 2 INJECTION, SOLUTION INTRAMUSCULAR; INTRAVENOUS at 05:25

## 2022-08-17 RX ADMIN — SODIUM CHLORIDE 1000 ML/HR: 0.9 INJECTION, SOLUTION INTRAVENOUS at 05:25

## 2022-08-17 RX ADMIN — MORPHINE SULFATE 4 MG: 2 INJECTION, SOLUTION INTRAMUSCULAR; INTRAVENOUS at 05:25

## 2022-08-17 RX ADMIN — HYDROMORPHONE HYDROCHLORIDE 0.5 MG: 1 INJECTION, SOLUTION INTRAMUSCULAR; INTRAVENOUS; SUBCUTANEOUS at 04:47

## 2022-08-17 NOTE — ED NOTES
39 y/o F presents to ED with c/o mid abdominal pain since 20:00 yesterday. Patient took pain medication and zofran prior to ED arrival with no relief. Patient nauseous at bedside, unable to lay down. Hx of sleep apnea. Oxygen saturation decreases to 80% when laying down. Patient sat up to 30% semi-fowlers, oxygen saturation 100% on room air.

## 2022-08-17 NOTE — ED PROVIDER NOTES
EMERGENCY DEPARTMENT HISTORY AND PHYSICAL EXAM       Please note that this dictation was completed with Followap, the computer voice recognition software. Quite often unanticipated grammatical, syntax, homophones, and other interpretive errors are inadvertently transcribed by the computer software. Please disregard these errors. Please excuse any errors that have escaped final proofreading. Date: 8/17/2022  Patient Name: Livan Nuñez  Patient Age and Sex: 38 y.o. female    History of Presenting Illness     Chief Complaint   Patient presents with    Abdominal Pain     Patient arrives with complaint of \"gall bladder attack\". Patient reports vomiting and pain that will not go away. Patient also states that she feels like she will pass out. History Provided By: Patient     Chief Complaint: ruq and epigastric pain      HPI: Livan Nuñez, is a 38 y.o. female whose history is noted below and includes obesity, htn, DM,  presents to the ED with ruq and epigastric abd pain that started around 8pm, about one hour after she ate dinner, and was constant since onset. Pain is sharp, non-radiating, 9/10. Associated with nausea and non-bilious, non-bloody emesis x 2. Patient reports onset of epigastric pain approximately 4 months ago. Pain is associated only with eating and will began anytime from after immediately eating to up to an hour after eating. It is often associated with nausea and vomiting. She reports multiple prior visits to the ER for these symptoms where labs and imaging were normal. She was diagnosed with gastritis or gastroparesis. She has been started on sulcrafate and Protonix without change. She had a hida scan recently but has not been notified of results yet. Pt denies any other alleviating or exacerbating factors. No other associated signs or symptoms. There are no other complaints, changes or physical findings at this time.      PCP: Caroline Lockhart MD    Past History   All documented elements of the [facility] reviewed and verified by me. -Dileep Casanova MD    Past Medical History:  Past Medical History:   Diagnosis Date    BMI 50.0-59.9, adult ([address] 2016    Diabetes mellitus type II        Past Surgical History:  Past Surgical History:   Procedure Laterality Date    HX APPENDECTOMY  16    Shirley Rodriguez MD    HX  SECTION         Family History:   Family History   Problem Relation Age of Onset    Diabetes Paternal Grandmother     Diabetes Maternal Grandfather        Social History:  Social History     Tobacco Use    Smoking status: Never    Smokeless tobacco: Never   Substance Use Topics    Alcohol use: No    Drug use: No       Current Medications:  No current facility-administered medications on file prior to encounter. Current Outpatient Medications on File Prior to Encounter   Medication Sig Dispense Refill    ondansetron (ZOFRAN ODT) 4 mg disintegrating tablet Take 1 Tablet by mouth every eight (8) hours as needed for Nausea or Vomiting. 10 Tablet 0    glucose blood VI test strips (OneTouch Verio test strips) strip Test blood sugars 3 times daily 300 Strip 3    furosemide (LASIX) 20 mg tablet TAKE 1 TABLET BY MOUTH EVERY DAY 30 Tablet 1    insulin glargine (Lantus Solostar U-100 Insulin) 100 unit/mL (3 mL) inpn INJECT 100 UNITS SUBCUTANEOUSLY ROUTE DAILY 30 mL 10    FreeStyle Reji 14 Day Sensor kit CHANGE 1 SENSOR EVERY 14 DAYS 2 Kit 11    dulaglutide (Trulicity) 3.74 LI/3.4 mL sub-q pen 0.5 mL by SubCUTAneous route every seven (7) days. 12 Syringe 3    insulin lispro (HumaLOG KwikPen Insulin) 200 unit/mL (3 mL) inpn INJECT 20-40 UNITS THREE TIMES A DAY BEFORE MEALS 90 mL 3    losartan (COZAAR) 50 mg tablet Take 1 Tablet by mouth two (2) times a day.  180 Tablet 3    montelukast (SINGULAIR) 10 mg tablet TAKE 1 TABLET BY MOUTH EVERY DAY      Insulin Needles, Disposable, (Trina Pen Needle) 32 gauge x 5/32\" ndle For insulin up to 5 times daily 200 Pen Needle 3    omeprazole (PRILOSEC) 40 mg capsule Take 20 mg by mouth daily. 4    Blood-Glucose Meter (ACCU-CHEK SHOSHANA CONNECT METER) misc by Does Not Apply route. Checks Blood sugar once a month      glucose blood VI test strips (ACCU-CHEK SHOSHANA PLUS TEST STRP) strip by Does Not Apply route See Admin Instructions. Insulin Needles, Disposable, (PAMELLA PEN NEEDLE) 32 gauge x 5/32\" ndle 4 times daily 150 Pen Needle 11    Insulin Syringe-Needle U-100 (BD INSULIN SYRINGE ULT-FINE II) 1 mL 31 x 5/16\" Syrg 1 Each by Does Not Apply route three (3) times daily. 100 Syringe 10    Lancets (ONE TOUCH DELICA) Misc by Does Not Apply route three (3) times daily. 100 Each 11       Allergies: Allergies   Allergen Reactions    Amoxicillin Angioedema    Celexa [Citalopram] Anxiety    Penicillins Angioedema       Review of Systems   All other systems reviewed and negative    Review of Systems   Constitutional:  Negative for fever. HENT: Negative. Eyes: Negative. Respiratory:  Negative for cough and shortness of breath. Cardiovascular:  Negative for chest pain and palpitations. Gastrointestinal:  Positive for abdominal pain, nausea and vomiting. Negative for constipation and diarrhea. Endocrine: Negative. Genitourinary:  Negative for dysuria and hematuria. Musculoskeletal:  Negative for joint swelling. Skin:  Negative for rash. Neurological:  Negative for weakness and numbness. Psychiatric/Behavioral: Negative. Negative for confusion. All other systems reviewed and are negative. Physical Exam   Reviewed patients vital signs and nursing note    Physical Exam  Vitals and nursing note reviewed. Constitutional:       Appearance: She is obese. She is not diaphoretic. HENT:      Head: Atraumatic. Nose: Nose normal.      Mouth/Throat:      Mouth: Mucous membranes are moist.   Eyes:      Extraocular Movements: Extraocular movements intact.       Conjunctiva/sclera: Conjunctivae normal.      Pupils: Pupils are equal, round, and reactive to light. Cardiovascular:      Rate and Rhythm: Normal rate and regular rhythm. Pulses: Normal pulses. Heart sounds: Normal heart sounds. Pulmonary:      Effort: Pulmonary effort is normal.      Breath sounds: Normal breath sounds. Abdominal:      General: Bowel sounds are normal. There is no distension. Palpations: Abdomen is soft. Tenderness: There is abdominal tenderness in the epigastric area. There is no right CVA tenderness or left CVA tenderness. Negative signs include Carvalho's sign. Musculoskeletal:         General: No tenderness. Normal range of motion. Cervical back: Normal range of motion. No rigidity. Skin:     General: Skin is warm and dry. Capillary Refill: Capillary refill takes less than 2 seconds. Neurological:      General: No focal deficit present. Mental Status: She is alert and oriented to person, place, and time. Psychiatric:         Mood and Affect: Mood normal.         Behavior: Behavior normal.       Diagnostic Study Results     Labs - I have personally reviewed and interpreted all laboratory results. Interpretation of available and pertinent results detailed below in MDM. Hesham Varma MD, MSc  Recent Results (from the past 24 hour(s))   METABOLIC PANEL, COMPREHENSIVE    Collection Time: 08/17/22 12:13 AM   Result Value Ref Range    Sodium 133 (L) 136 - 145 mmol/L    Potassium 4.2 3.5 - 5.1 mmol/L    Chloride 100 97 - 108 mmol/L    CO2 25 21 - 32 mmol/L    Anion gap 8 5 - 15 mmol/L    Glucose 379 (H) 65 - 100 mg/dL    BUN 25 (H) 6 - 20 MG/DL    Creatinine 1.65 (H) 0.55 - 1.02 MG/DL    BUN/Creatinine ratio 15 12 - 20      GFR est AA 42 (L) >60 ml/min/1.73m2    GFR est non-AA 34 (L) >60 ml/min/1.73m2    Calcium 9.6 8.5 - 10.1 MG/DL    Bilirubin, total 0.4 0.2 - 1.0 MG/DL    ALT (SGPT) 34 12 - 78 U/L    AST (SGOT) 20 15 - 37 U/L    Alk.  phosphatase 92 45 - 117 U/L    Protein, total 8.2 6.4 - 8.2 g/dL    Albumin 3.4 (L) 3.5 - 5.0 g/dL Globulin 4.8 (H) 2.0 - 4.0 g/dL    A-G Ratio 0.7 (L) 1.1 - 2.2     LIPASE    Collection Time: 08/17/22 12:13 AM   Result Value Ref Range    Lipase 64 (L) 73 - 393 U/L   URINALYSIS W/ REFLEX CULTURE    Collection Time: 08/17/22  4:00 AM    Specimen: Urine   Result Value Ref Range    Color YELLOW/STRAW      Appearance CLEAR CLEAR      Specific gravity 1.028      pH (UA) 6.5 5.0 - 8.0      Protein 300 (A) NEG mg/dL    Glucose >1,000 (A) NEG mg/dL    Ketone 40 (A) NEG mg/dL    Bilirubin Negative NEG      Blood MODERATE (A) NEG      Urobilinogen 1.0 0.2 - 1.0 EU/dL    Nitrites Negative NEG      Leukocyte Esterase Negative NEG      UA:UC IF INDICATED CULTURE NOT INDICATED BY UA RESULT CNI      WBC 0-4 0 - 4 /hpf    RBC 10-20 0 - 5 /hpf    Epithelial cells MODERATE (A) FEW /lpf    Bacteria Negative NEG /hpf    Hyaline cast 0-2 0 - 2 /lpf   CBC WITH AUTOMATED DIFF    Collection Time: 08/17/22  4:00 AM   Result Value Ref Range    WBC 14.9 (H) 3.6 - 11.0 K/uL    RBC 4.10 3.80 - 5.20 M/uL    HGB 11.2 (L) 11.5 - 16.0 g/dL    HCT 34.1 (L) 35.0 - 47.0 %    MCV 83.2 80.0 - 99.0 FL    MCH 27.3 26.0 - 34.0 PG    MCHC 32.8 30.0 - 36.5 g/dL    RDW 14.1 11.5 - 14.5 %    PLATELET 402 072 - 144 K/uL    MPV 9.7 8.9 - 12.9 FL    NRBC 0.0 0  WBC    ABSOLUTE NRBC 0.00 0.00 - 0.01 K/uL    NEUTROPHILS 91 (H) 32 - 75 %    LYMPHOCYTES 7 (L) 12 - 49 %    MONOCYTES 2 (L) 5 - 13 %    EOSINOPHILS 0 0 - 7 %    BASOPHILS 0 0 - 1 %    IMMATURE GRANULOCYTES 0 0.0 - 0.5 %    ABS. NEUTROPHILS 13.6 (H) 1.8 - 8.0 K/UL    ABS. LYMPHOCYTES 1.0 0.8 - 3.5 K/UL    ABS. MONOCYTES 0.3 0.0 - 1.0 K/UL    ABS. EOSINOPHILS 0.0 0.0 - 0.4 K/UL    ABS. BASOPHILS 0.0 0.0 - 0.1 K/UL    ABS. IMM.  GRANS. 0.0 0.00 - 0.04 K/UL    DF MANUAL      RBC COMMENTS NORMOCYTIC, NORMOCHROMIC         Radiologic Studies - I have personally reviewed and interpreted (see MDM for brief interpreation of available results) all imaging studies and agree with radiology interpretation and report. - Paul Gray MD, MSc  No orders to display         Medical Decision Making   I am the first provider for this patient. Records Reviewed:   I reviewed our electronic medical record system for any past medical records that were available that may contribute to the patient's current condition, including their PMH, surgical history, social and family history. This includes most recent ED visits, any available discharge summaries and prior ECGs, which I have reviewed and interpreted personally. I have summarized most salient findings in my HPI and MDM. Paul Gray MD, MSc    I also reviewed the nursing notes and vital signs from today's visit. Nursing notes will be reviewed as they become available in realtime while the pt has been in the ED. Paul Gray MD, MSc      Vital Signs-Reviewed the patient's vital signs. Patient Vitals for the past 24 hrs:   Temp Pulse Resp BP SpO2   08/17/22 0418 -- -- 12 (!) 176/69 97 %   08/17/22 0332 97.9 °F (36.6 °C) 88 24 (!) 191/106 98 %   08/16/22 2356 97.8 °F (36.6 °C) 90 24 (!) 160/112 100 %           Provider Notes (Medical Decision Making):   Patient presents with epigastric, RUQ abdominal pain. Differential includes gastritis, pancreatitis, cholelithiasis, cholecystitis, hepatitis, muscular strain, renal pathology, gastroenteritis. Less likely ACS. Will obtain labs and possibly US. Will give analgesics and antiemetics PRN. ED Course:   Initial assessment performed. The patients presenting problems have been discussed, and they are in agreement with the care plan formulated and outlined with them. I have encouraged them to ask questions as they arise throughout their visit. Awa reviewed her medical record including results of recent hida scan which is showing low EF. concern for functional gallblader disorder. She is currently completely pain free. I personally reviewed and interpreted the patient's laboratory studies.  Notable for elevated glucose without evidence of dka. Creatinine at baseline. Repeat abd exam without any tenderness to palpation. No indication for emergent hospitalization. Will refer to outpatient surgery clinic. Discussed elevated bp here with patient. This may be driven by discomfort she had. No signs or concern for hypertensive emergency. Prior to dc bp taken by me at bedside down to 149/72. Recommend close follow up and monitoring. Progress note:  Patient has been reassessed and reports feeling considerably better, has normal vital signs and feels comfortable going home. I think this is reasonable as no findings today suggest a life-threatening condition. DISPOSITION: DISCHARGE  The patient's results have been reviewed with patient and available family and/or caregiver. They verbally convey their understanding and agreement of the patient's signs, symptoms, diagnosis, treatment and prognosis and additionally agree to follow up as recommended in the discharge instructions or to return to the Emergency Department should the patient's condition change prior to their follow-up appointment. The patient and available family and/or caregiver verbally agree with the care plan and all of their questions have been answered. The discharge instructions have also been provided to the them with educational information regarding the patient's diagnosis as well a list of reasons why the patient would want to return to the ER prior to their follow-up appointment should any concerns arise, the patient's condition change or symptoms worsen. Ezequiel Cheek MD, Msc    PLAN:  Discharge Medication List as of 8/17/2022  6:34 AM        START taking these medications    Details   metoclopramide HCl (Reglan) 10 mg tablet Take 1 Tablet by mouth every six (6) hours as needed for Nausea (abdominal pain) for up to 10 days. , Normal, Disp-12 Tablet, R-0           CONTINUE these medications which have NOT CHANGED    Details   ondansetron (ZOFRAN ODT) 4 mg disintegrating tablet Take 1 Tablet by mouth every eight (8) hours as needed for Nausea or Vomiting., Normal, Disp-10 Tablet, R-0      !! glucose blood VI test strips (OneTouch Verio test strips) strip Test blood sugars 3 times daily, Normal, Disp-300 Strip, R-3      furosemide (LASIX) 20 mg tablet TAKE 1 TABLET BY MOUTH EVERY DAY, Normal, Disp-30 Tablet, R-1      insulin glargine (Lantus Solostar U-100 Insulin) 100 unit/mL (3 mL) inpn INJECT 100 UNITS SUBCUTANEOUSLY ROUTE DAILY, Normal, Disp-30 mL, R-10      FreeStyle Reji 14 Day Sensor kit CHANGE 1 SENSOR EVERY 14 DAYS, Normal, Disp-2 Kit, R-11, TRUDI      dulaglutide (Trulicity) 1.19 VR/1.3 mL sub-q pen 0.5 mL by SubCUTAneous route every seven (7) days. , Normal, Disp-12 Syringe, R-3      insulin lispro (HumaLOG KwikPen Insulin) 200 unit/mL (3 mL) inpn INJECT 20-40 UNITS THREE TIMES A DAY BEFORE MEALS, Normal, Disp-90 mL, R-3      losartan (COZAAR) 50 mg tablet Take 1 Tablet by mouth two (2) times a day., Normal, Disp-180 Tablet, R-3Correction to prior order. Take twice daily. montelukast (SINGULAIR) 10 mg tablet TAKE 1 TABLET BY MOUTH EVERY DAY, Historical Med      !! Insulin Needles, Disposable, (Pamella Pen Needle) 32 gauge x 5/32\" ndle For insulin up to 5 times daily, Normal, Disp-200 Pen Needle, R-3      omeprazole (PRILOSEC) 40 mg capsule Take 20 mg by mouth daily. , Historical Med, R-4      Blood-Glucose Meter misc by Does Not Apply route. Checks Blood sugar once a month, Historical Med      !! glucose blood VI test strips strip by Does Not Apply route See Admin Instructions. , Historical Med      !! Insulin Needles, Disposable, (PAMELLA PEN NEEDLE) 32 gauge x 5/32\" ndle 4 times daily, Normal, Disp-150 Pen Needle, R-11      Insulin Syringe-Needle U-100 (BD INSULIN SYRINGE ULT-FINE II) 1 mL 31 x 5/16\" Syrg 1 Each by Does Not Apply route three (3) times daily. , Normal, Disp-100 Syringe, R-10      Lancets (ONE TOUCH DELICA) Misc by Does Not Apply route three (3) times daily. Normal, Disp-100 Each, R-11       !! - Potential duplicate medications found. Please discuss with provider. 2.     Follow-up Information       Follow up With Specialties Details Why Contact Info    Etienne Jacobs MD General Surgery Call today schedule an appointment as soon as possible Joan GARCIA [phone]      [facility] EMERGENCY DEPT Emergency Medicine Go to  As needed, If symptoms worsen [address]  [address] [facility]  [phone]          3. Return to ED if worse       I, Cathryn Araujo MD, am the attending of record for this patient encounter. Diagnosis     Clinical Impression:   1. Dysfunctional gallbladder    2. RUQ abdominal pain    3. Elevated BP without diagnosis of hypertension    4. Hyperglycemia due to diabetes mellitus ([facility] [address]        Attestation:  I personally performed the services described in this documentation on this date 8/17/2022 for patient Ann Keene.   Shala Echeverria MD

## 2022-08-17 NOTE — PROGRESS NOTES
Identified pt with two pt identifiers(name and ). Reviewed record in preparation for visit and have obtained necessary documentation. All patient medications has been reviewed. Chief Complaint   Patient presents with    Abdominal Pain     Seen at the request of Dr Dasha Garcia for evaluation of her gallbladder       Health Maintenance Due   Topic    Hepatitis C Screening     Depression Screen     COVID-19 Vaccine (1)    Pneumococcal 0-64 years (1 - PCV)    DTaP/Tdap/Td series (1 - Tdap)    Cervical cancer screen     Eye Exam Retinal or Dilated     Foot Exam Q1     A1C test (Diabetic or Prediabetic)     MICROALBUMIN Q1     Lipid Screen        Vitals:    22 1048   BP: 127/81   Pulse: 92   Resp: 20   Temp: 97.3 °F (36.3 °C)   TempSrc: Temporal   SpO2: 96%   Weight: 129.7 kg (286 lb)   Height: 5' 2\" (1.575 m)   PainSc:   0 - No pain       4. Have you been to the ER, urgent care clinic since your last visit? Hospitalized since your last visit? ED 22 abd pain    5. Have you seen or consulted any other health care providers outside of the [address] [place] [address] since your last visit? Include any pap smears or colon screening. No      Patient is accompanied by friend I have received verbal consent from Estevan Bright to discuss any/all medical information while they are present in the room.

## 2022-08-17 NOTE — DISCHARGE INSTRUCTIONS
It was a pleasure taking care of you in our Emergency Department today. We know that when you come to [facility], you are entrusting us with your health, comfort, and safety. Our physicians and nurses honor that trust, and truly appreciate the opportunity to care for you and your loved ones. We also value your feedback. If you receive a survey about your Emergency Department experience today, please fill it out. We care about our patients' feedback, and we listen to what you have to say.   Thank you!       --- Dr. Елена Nash MD

## 2022-08-17 NOTE — H&P (VIEW-ONLY)
Surgery History and Physical    Subjective:      Taye Sunshine is a 38 y.o. female who presents for evaluation of gallbladder issues. In April, after eating, she wound report epigastric abdominal pain. They would usually go away after an hour. More recently, its getting worse. Half of a glucerna or small amounts of food is causing pain. The pain would last 12-15 hours. Yesterday, she had pain for 8-9 hours. She went to the ER. She has been to the 5 ER visits and 2 hospital visits. Last A1c was 10.4. No fevers. RUQ US: IMPRESSION  Some tenderness was noted in the gallbladder region, but the gallbladder is  normal, with no evidence of cholecystolithiasis or acute cholecystitis. HIDA: IMPRESSION  Abnormal gallbladder emptying study utilizing ensure with an EF of  only 19%. Past Medical History:   Diagnosis Date    BMI 50.0-59.9, adult ([facility] [address] 2016    Diabetes mellitus type II      Past Surgical History:   Procedure Laterality Date    HX APPENDECTOMY  16    Corona Rodriguez MD    HX  SECTION        Family History   Problem Relation Age of Onset    Diabetes Paternal Grandmother     Diabetes Maternal Grandfather      Social History     Tobacco Use    Smoking status: Never    Smokeless tobacco: Never   Substance Use Topics    Alcohol use: No      Prior to Admission medications    Medication Sig Start Date End Date Taking? Authorizing Provider   Trintellix 10 mg tablet Take 10 mg by mouth daily.  22  Yes Provider, Historical   glucose blood VI test strips (OneTouch Verio test strips) strip Test blood sugars 3 times daily 22  Yes Jen Gomez MD   insulin glargine (Lantus Solostar U-100 Insulin) 100 unit/mL (3 mL) inpn INJECT 100 UNITS SUBCUTANEOUSLY ROUTE DAILY 21  Yes Jen Gomez MD   FreeStyle Reji 14 Day Sensor kit CHANGE 1 SENSOR EVERY 14 DAYS 9/15/21  Yes Jen Gomez MD   insulin lispro (HumaLOG KwikPen Insulin) 200 unit/mL (3 mL) inpn INJECT 20-40 UNITS THREE TIMES A DAY BEFORE MEALS 7/1/21  Yes Sallyann Frankel, MD   losartan (COZAAR) 50 mg tablet Take 1 Tablet by mouth two (2) times a day. 6/6/21  Yes Sallyann Frankel, MD   Insulin Needles, Disposable, (Trina Pen Needle) 32 gauge x 5/32\" ndle For insulin up to 5 times daily 3/18/20  Yes Sallyann Frankel, MD   Blood-Glucose Meter misc by Does Not Apply route. Checks Blood sugar once a month   Yes Provider, Historical   glucose blood VI test strips strip by Does Not Apply route See Admin Instructions. Yes Provider, Historical   Insulin Needles, Disposable, (TRINA PEN NEEDLE) 32 gauge x 5/32\" ndle 4 times daily 1/17/17  Yes Modesto Mistry MD   Insulin Syringe-Needle U-100 (BD INSULIN SYRINGE ULT-FINE II) 1 mL 31 x 5/16\" Syrg 1 Each by Does Not Apply route three (3) times daily. 10/14/13  Yes Modesto Mistry MD   Lancets ([facility]) Misc by Does Not Apply route three (3) times daily. 10/31/12  Yes Modesto Mistry MD   metoclopramide HCl (Reglan) 10 mg tablet Take 1 Tablet by mouth every six (6) hours as needed for Nausea (abdominal pain) for up to 10 days. 8/17/22 8/27/22  Devang Reyna MD   ondansetron (ZOFRAN ODT) 4 mg disintegrating tablet Take 1 Tablet by mouth every eight (8) hours as needed for Nausea or Vomiting. 6/16/22   GIOVANA Langley   furosemide (LASIX) 20 mg tablet TAKE 1 TABLET BY MOUTH EVERY DAY  Patient not taking: Reported on 8/17/2022 12/16/21   Sallyann Frankel, MD   dulaglutide (Trulicity) 1.37 UL/6.7 mL sub-q pen 0.5 mL by SubCUTAneous route every seven (7) days. Patient not taking: Reported on 8/17/2022 8/4/21   Sallyann Frankel, MD   montelukast (SINGULAIR) 10 mg tablet TAKE 1 TABLET BY MOUTH EVERY DAY 4/16/21   Provider, Historical   omeprazole (PRILOSEC) 40 mg capsule Take 20 mg by mouth daily.   Patient not taking: Reported on 8/17/2022 4/8/19   Provider, Historical      Allergies   Allergen Reactions    Amoxicillin Angioedema    Celexa [Citalopram] Anxiety    Penicillins Angioedema       Review of Systems:  A comprehensive review of systems was negative except for that written in the History of Present Illness. Objective:     Visit Vitals  /81 (BP 1 Location: Left upper arm, BP Patient Position: Sitting)   Pulse 92   Temp 97.3 °F (36.3 °C) (Temporal)   Resp 20   Ht 5' 2\" (1.575 m)   Wt 129.7 kg (286 lb)   SpO2 96%   BMI 52.31 kg/m²       Physical Exam:  Physical Exam:  General:  Alert, cooperative, no distress, appears stated age. Eyes:  Conjunctivae/corneas clear. Ears:  Normal external ear canals both ears. Nose: Nares normal. Septum midline. Mouth/Throat: Lips, mucosa, and tongue normal. Teeth and gums normal.   Neck: Supple, symmetrical, trachea midline   Back:   Symmetric, no curvature. ROM normal.    Lungs:   Clear to auscultation bilaterally. Heart:  Regular rate and rhythm   Abdomen:   Soft, non-tender. Bowel sounds normal. No masses,  No organomegaly. Extremities: Extremities normal, atraumatic, no cyanosis or edema. Skin: Skin color, texture, turgor normal. No rashes or lesions         Assessment:     38year old uncontrolled diabetic with biliary dyskinesia    Plan:     I Recommend we proceed with Laparoscopic Cholecystectomy with Intraoperative Cholangiogram.  Risks, Benefits, and Alternatives of the procedure were discussed with the patient and family including:  the risk of the anesthesia, bleeding, infection, injury to the intestines, injury to the ducts, and the lack of symptomatic improvement.   The patient is agreeable to proceed.  -patient understands that she is at higher risk of complications and infections due to uncontrolled blood sugar

## 2022-08-17 NOTE — LETTER
8/17/2022    Patient: Panfilo Nuñez   YOB: 1980   Date of Visit: 8/17/2022     Neha Sevilla MD  [address] [address]  [address] [facility] [address]    Dear Neha Sevilla MD,      Thank you for referring Ms. Panfilo Nuñez to [address] [place] [address] for evaluation. My notes for this consultation are attached. If you have questions, please do not hesitate to call me. I look forward to following your patient along with you.       Sincerely,    Laura Frazier MD

## 2022-08-17 NOTE — ED NOTES
I have reviewed discharge instructions with the patient. The patient verbalized understanding. Patient to report to the ED with worsening symptoms. States her pain is now controlled.

## 2022-08-17 NOTE — PROGRESS NOTES
Surgery History and Physical    Subjective:      Desire Palmer is a 38 y.o. female who presents for evaluation of gallbladder issues. In April, after eating, she wound report epigastric abdominal pain. They would usually go away after an hour. More recently, its getting worse. Half of a glucerna or small amounts of food is causing pain. The pain would last 12-15 hours. Yesterday, she had pain for 8-9 hours. She went to the ER. She has been to the 5 ER visits and 2 hospital visits. Last A1c was 10.4. No fevers. RUQ US: IMPRESSION  Some tenderness was noted in the gallbladder region, but the gallbladder is  normal, with no evidence of cholecystolithiasis or acute cholecystitis. HIDA: IMPRESSION  Abnormal gallbladder emptying study utilizing ensure with an EF of  only 19%. Past Medical History:   Diagnosis Date    BMI 50.0-59.9, adult ([facility] [address] 2016    Diabetes mellitus type II      Past Surgical History:   Procedure Laterality Date    HX APPENDECTOMY  16    Abdiel Rodriguez MD    HX  SECTION        Family History   Problem Relation Age of Onset    Diabetes Paternal Grandmother     Diabetes Maternal Grandfather      Social History     Tobacco Use    Smoking status: Never    Smokeless tobacco: Never   Substance Use Topics    Alcohol use: No      Prior to Admission medications    Medication Sig Start Date End Date Taking? Authorizing Provider   Trintellix 10 mg tablet Take 10 mg by mouth daily.  22  Yes Provider, Historical   glucose blood VI test strips (OneTouch Verio test strips) strip Test blood sugars 3 times daily 22  Yes Molinda Snellen, MD   insulin glargine (Lantus Solostar U-100 Insulin) 100 unit/mL (3 mL) inpn INJECT 100 UNITS SUBCUTANEOUSLY ROUTE DAILY 21  Yes Molinda Snellen, MD   FreeStyle Reji 14 Day Sensor kit CHANGE 1 SENSOR EVERY 14 DAYS 9/15/21  Yes Molinda Snellen, MD   insulin lispro (HumaLOG KwikPen Insulin) 200 unit/mL (3 mL) inpn INJECT 20-40 UNITS THREE TIMES A DAY BEFORE MEALS 7/1/21  Yes Des Smith MD   losartan (COZAAR) 50 mg tablet Take 1 Tablet by mouth two (2) times a day. 6/6/21  Yes Des Smith MD   Insulin Needles, Disposable, (Trina Pen Needle) 32 gauge x 5/32\" ndle For insulin up to 5 times daily 3/18/20  Yes Des Smith MD   Blood-Glucose Meter misc by Does Not Apply route. Checks Blood sugar once a month   Yes Provider, Historical   glucose blood VI test strips strip by Does Not Apply route See Admin Instructions. Yes Provider, Historical   Insulin Needles, Disposable, (TRINA PEN NEEDLE) 32 gauge x 5/32\" ndle 4 times daily 1/17/17  Yes Sangita Astudillo MD   Insulin Syringe-Needle U-100 (BD INSULIN SYRINGE ULT-FINE II) 1 mL 31 x 5/16\" Syrg 1 Each by Does Not Apply route three (3) times daily. 10/14/13  Yes Sangita Astudillo MD   Lancets ([facility]) Misc by Does Not Apply route three (3) times daily. 10/31/12  Yes Sangita Astudillo MD   metoclopramide HCl (Reglan) 10 mg tablet Take 1 Tablet by mouth every six (6) hours as needed for Nausea (abdominal pain) for up to 10 days. 8/17/22 8/27/22  Tanisha Ramey MD   ondansetron (ZOFRAN ODT) 4 mg disintegrating tablet Take 1 Tablet by mouth every eight (8) hours as needed for Nausea or Vomiting. 6/16/22   GIOVANA Schwartz   furosemide (LASIX) 20 mg tablet TAKE 1 TABLET BY MOUTH EVERY DAY  Patient not taking: Reported on 8/17/2022 12/16/21   Des Smith MD   dulaglutide (Trulicity) 3.26 EC/4.4 mL sub-q pen 0.5 mL by SubCUTAneous route every seven (7) days. Patient not taking: Reported on 8/17/2022 8/4/21   Des Smith MD   montelukast (SINGULAIR) 10 mg tablet TAKE 1 TABLET BY MOUTH EVERY DAY 4/16/21   Provider, Historical   omeprazole (PRILOSEC) 40 mg capsule Take 20 mg by mouth daily.   Patient not taking: Reported on 8/17/2022 4/8/19   Provider, Historical      Allergies   Allergen Reactions    Amoxicillin Angioedema    Celexa [Citalopram] Anxiety    Penicillins Angioedema       Review of Systems:  A comprehensive review of systems was negative except for that written in the History of Present Illness. Objective:     Visit Vitals  /81 (BP 1 Location: Left upper arm, BP Patient Position: Sitting)   Pulse 92   Temp 97.3 °F (36.3 °C) (Temporal)   Resp 20   Ht 5' 2\" (1.575 m)   Wt 129.7 kg (286 lb)   SpO2 96%   BMI 52.31 kg/m²       Physical Exam:  Physical Exam:  General:  Alert, cooperative, no distress, appears stated age. Eyes:  Conjunctivae/corneas clear. Ears:  Normal external ear canals both ears. Nose: Nares normal. Septum midline. Mouth/Throat: Lips, mucosa, and tongue normal. Teeth and gums normal.   Neck: Supple, symmetrical, trachea midline   Back:   Symmetric, no curvature. ROM normal.    Lungs:   Clear to auscultation bilaterally. Heart:  Regular rate and rhythm   Abdomen:   Soft, non-tender. Bowel sounds normal. No masses,  No organomegaly. Extremities: Extremities normal, atraumatic, no cyanosis or edema. Skin: Skin color, texture, turgor normal. No rashes or lesions         Assessment:     38year old uncontrolled diabetic with biliary dyskinesia    Plan:     I Recommend we proceed with Laparoscopic Cholecystectomy with Intraoperative Cholangiogram.  Risks, Benefits, and Alternatives of the procedure were discussed with the patient and family including:  the risk of the anesthesia, bleeding, infection, injury to the intestines, injury to the ducts, and the lack of symptomatic improvement.   The patient is agreeable to proceed.  -patient understands that she is at higher risk of complications and infections due to uncontrolled blood sugar

## 2022-08-18 NOTE — PERIOP NOTES
[facility]  Preoperative Instructions    Surgery Date August 22          Time of Arrival to be called  Contact#[phone]    1. On the day of your surgery, please report to the Surgical Services Registration Desk and sign in at your designated time. The Surgery Center is located to the right of the Emergency Room. 2. You must have someone with you to drive you home. You should not drive a car for 24 hours following surgery. Please make arrangements for a friend or family member to stay with you for the first 24 hours after your surgery. 3. Do not have anything to eat or drink (including water, gum, mints, coffee, juice) after midnight ? August 21? Liss Helton ? This may not apply to medications prescribed by your physician. ?(Please note below the special instructions with medications to take the morning of your procedure.)    4. We recommend you do not drink any alcoholic beverages for 24 hours before and after your surgery. 5. Contact your surgeons office for instructions on the following medications: non-steroidal anti-inflammatory drugs (i.e. Advil, Aleve), vitamins, and supplements. (Some surgeons will want you to stop these medications prior to surgery and others may allow you to take them)  **If you are currently taking Plavix, Coumadin, Aspirin and/or other blood-thinning agents, contact your surgeon for instructions. ** Your surgeon will partner with the physician prescribing these medications to determine if it is safe to stop or if you need to continue taking. Please do not stop taking these medications without instructions from your surgeon    6. Wear comfortable clothes. Wear glasses instead of contacts. Do not bring any money or jewelry. Please bring picture ID, insurance card, and any prearranged co-payment or hospital payment. Do not wear make-up, particularly mascara the morning of your surgery.   Do not wear nail polish, particularly if you are having foot /hand surgery. Wear your hair loose or down, no ponytails, buns, clara pins or clips. All body piercings must be removed. Please shower with antibacterial soap for three consecutive days before and on the morning of surgery, but do not apply any lotions, powders or deodorants after the shower on the day of surgery. Please use a fresh towels after each shower. Please sleep in clean clothes and change bed linens the night before surgery. Please do not shave for 48 hours prior to surgery. Shaving of the face is acceptable. 7. You should understand that if you do not follow these instructions your surgery may be cancelled. If your physical condition changes (I.e. fever, cold or flu) please contact your surgeon as soon as possible. 8. It is important that you be on time. If a situation occurs where you may be late, please call [phone] (OR Holding Area). 9. If you have any questions and or problems, please call [phone] (Pre-admission Testing). 10. Your surgery time may be subject to change. You will receive a phone call the evening prior if your time changes. 11.  If having outpatient surgery, you must have someone to drive you here, stay with you during the duration of your stay, and to drive you home at time of discharge. 12.  Due to current COVID restrictions, only ONE adult may accompany you the day of the procedure. We have limited seating available. If our waiting room is at capacity, your ride may be asked to remain in their vehicle. No children are allowed in the waiting room    Special Instructions:   Check BS morning of surgery and if over 200 or any symptoms that sugar is off please call [phone]    TAKE ALL MEDICATIONS DAY OF SURGERY Haynes Cushing understand a pre-operative phone call will be made to verify my surgery time. In the event that I am not available, I give permission for a message to be left on my answering service and/or with another person? yes         ___________________      __________   _________    (Signature of Patient)             (Witness)                (Date and Time)

## 2022-08-22 ENCOUNTER — APPOINTMENT (OUTPATIENT)
Dept: GENERAL RADIOLOGY | Age: 42
End: 2022-08-22
Attending: SURGERY
Payer: COMMERCIAL

## 2022-08-22 ENCOUNTER — ANESTHESIA EVENT (OUTPATIENT)
Dept: SURGERY | Age: 42
End: 2022-08-22
Payer: COMMERCIAL

## 2022-08-22 ENCOUNTER — ANESTHESIA (OUTPATIENT)
Dept: SURGERY | Age: 42
End: 2022-08-22
Payer: COMMERCIAL

## 2022-08-22 ENCOUNTER — HOSPITAL ENCOUNTER (OUTPATIENT)
Age: 42
Setting detail: OUTPATIENT SURGERY
Discharge: HOME | End: 2022-08-22
Attending: SURGERY | Admitting: SURGERY
Payer: COMMERCIAL

## 2022-08-22 VITALS
WEIGHT: 277.78 LBS | HEIGHT: 63 IN | BODY MASS INDEX: 49.22 KG/M2 | SYSTOLIC BLOOD PRESSURE: 144 MMHG | DIASTOLIC BLOOD PRESSURE: 71 MMHG | OXYGEN SATURATION: 96 % | RESPIRATION RATE: 17 BRPM | HEART RATE: 93 BPM | TEMPERATURE: 97.8 F

## 2022-08-22 DIAGNOSIS — K82.8 BILIARY DYSKINESIA: Primary | ICD-10-CM

## 2022-08-22 LAB
GLUCOSE BLD STRIP.AUTO-MCNC: 206 MG/DL (ref 65–117)
GLUCOSE BLD STRIP.AUTO-MCNC: 286 MG/DL (ref 65–117)
GLUCOSE BLD STRIP.AUTO-MCNC: 323 MG/DL (ref 65–117)
SERVICE CMNT-IMP: ABNORMAL

## 2022-08-22 PROCEDURE — 74300 X-RAY BILE DUCTS/PANCREAS: CPT

## 2022-08-22 PROCEDURE — 74300 X-RAY BILE DUCTS/PANCREAS: CPT | Performed by: SURGERY

## 2022-08-22 PROCEDURE — 74011636637 HC RX REV CODE- 636/637: Performed by: STUDENT IN AN ORGANIZED HEALTH CARE EDUCATION/TRAINING PROGRAM

## 2022-08-22 PROCEDURE — 74011250636 HC RX REV CODE- 250/636: Performed by: SURGERY

## 2022-08-22 PROCEDURE — 74011000250 HC RX REV CODE- 250: Performed by: NURSE ANESTHETIST, CERTIFIED REGISTERED

## 2022-08-22 PROCEDURE — 74011250636 HC RX REV CODE- 250/636: Performed by: NURSE ANESTHETIST, CERTIFIED REGISTERED

## 2022-08-22 PROCEDURE — 76210000020 HC REC RM PH II FIRST 0.5 HR: Performed by: SURGERY

## 2022-08-22 PROCEDURE — 76210000017 HC OR PH I REC 1.5 TO 2 HR: Performed by: SURGERY

## 2022-08-22 PROCEDURE — 77030012770 HC TRCR OPT FX AMR -B: Performed by: SURGERY

## 2022-08-22 PROCEDURE — 77030010507 HC ADH SKN DERMBND J&J -B: Performed by: SURGERY

## 2022-08-22 PROCEDURE — 77030004818 HC CATH CHOLGM TELE -B: Performed by: SURGERY

## 2022-08-22 PROCEDURE — 88304 TISSUE EXAM BY PATHOLOGIST: CPT

## 2022-08-22 PROCEDURE — 82962 GLUCOSE BLOOD TEST: CPT

## 2022-08-22 PROCEDURE — 74011250637 HC RX REV CODE- 250/637

## 2022-08-22 PROCEDURE — 77030011280 HC ELECTRD MPLR J&J -B: Performed by: SURGERY

## 2022-08-22 PROCEDURE — 77030008756 HC TU IRR SUC STRY -B: Performed by: SURGERY

## 2022-08-22 PROCEDURE — 76060000033 HC ANESTHESIA 1 TO 1.5 HR: Performed by: SURGERY

## 2022-08-22 PROCEDURE — 74011250637 HC RX REV CODE- 250/637: Performed by: SURGERY

## 2022-08-22 PROCEDURE — 47563 LAPARO CHOLECYSTECTOMY/GRAPH: CPT | Performed by: SURGERY

## 2022-08-22 PROCEDURE — 77030010513 HC APPL CLP LIG J&J -C: Performed by: SURGERY

## 2022-08-22 PROCEDURE — 74011000636 HC RX REV CODE- 636: Performed by: SURGERY

## 2022-08-22 PROCEDURE — 77030009848 HC PASSR SUT SET COOP -C: Performed by: SURGERY

## 2022-08-22 PROCEDURE — 77030020829: Performed by: SURGERY

## 2022-08-22 PROCEDURE — 74011000250 HC RX REV CODE- 250: Performed by: SURGERY

## 2022-08-22 PROCEDURE — 74011250636 HC RX REV CODE- 250/636: Performed by: ANESTHESIOLOGY

## 2022-08-22 PROCEDURE — 77030002933 HC SUT MCRYL J&J -A: Performed by: SURGERY

## 2022-08-22 PROCEDURE — 77030026438 HC STYL ET INTUB CARD -A: Performed by: STUDENT IN AN ORGANIZED HEALTH CARE EDUCATION/TRAINING PROGRAM

## 2022-08-22 PROCEDURE — 76010000149 HC OR TIME 1 TO 1.5 HR: Performed by: SURGERY

## 2022-08-22 PROCEDURE — 77030008603 HC TRCR ENDOSC EPATH J&J -C: Performed by: SURGERY

## 2022-08-22 PROCEDURE — 77030031139 HC SUT VCRL2 J&J -A: Performed by: SURGERY

## 2022-08-22 PROCEDURE — 74011250636 HC RX REV CODE- 250/636: Performed by: STUDENT IN AN ORGANIZED HEALTH CARE EDUCATION/TRAINING PROGRAM

## 2022-08-22 PROCEDURE — 2709999900 HC NON-CHARGEABLE SUPPLY: Performed by: SURGERY

## 2022-08-22 PROCEDURE — 77030013079 HC BLNKT BAIR HGGR 3M -A: Performed by: STUDENT IN AN ORGANIZED HEALTH CARE EDUCATION/TRAINING PROGRAM

## 2022-08-22 PROCEDURE — 77030016151 HC PROTCTR LNS DFOG COVD -B: Performed by: SURGERY

## 2022-08-22 PROCEDURE — 77030008684 HC TU ET CUF COVD -B: Performed by: STUDENT IN AN ORGANIZED HEALTH CARE EDUCATION/TRAINING PROGRAM

## 2022-08-22 RX ORDER — DIPHENHYDRAMINE HYDROCHLORIDE 50 MG/ML
12.5 INJECTION, SOLUTION INTRAMUSCULAR; INTRAVENOUS AS NEEDED
Status: DISCONTINUED | OUTPATIENT
Start: 2022-08-22 | End: 2022-08-22 | Stop reason: HOSPADM

## 2022-08-22 RX ORDER — ONDANSETRON 2 MG/ML
INJECTION INTRAMUSCULAR; INTRAVENOUS AS NEEDED
Status: DISCONTINUED | OUTPATIENT
Start: 2022-08-22 | End: 2022-08-22 | Stop reason: HOSPADM

## 2022-08-22 RX ORDER — OXYCODONE HYDROCHLORIDE 5 MG/1
5 TABLET ORAL
Qty: 12 TABLET | Refills: 0 | Status: SHIPPED | OUTPATIENT
Start: 2022-08-22 | End: 2022-08-25

## 2022-08-22 RX ORDER — SODIUM CHLORIDE 0.9 % (FLUSH) 0.9 %
5-40 SYRINGE (ML) INJECTION EVERY 8 HOURS
Status: DISCONTINUED | OUTPATIENT
Start: 2022-08-22 | End: 2022-08-22 | Stop reason: HOSPADM

## 2022-08-22 RX ORDER — LIDOCAINE HYDROCHLORIDE 20 MG/ML
INJECTION, SOLUTION EPIDURAL; INFILTRATION; INTRACAUDAL; PERINEURAL AS NEEDED
Status: DISCONTINUED | OUTPATIENT
Start: 2022-08-22 | End: 2022-08-22 | Stop reason: HOSPADM

## 2022-08-22 RX ORDER — INSULIN LISPRO 100 [IU]/ML
7 INJECTION, SOLUTION INTRAVENOUS; SUBCUTANEOUS ONCE
Status: COMPLETED | OUTPATIENT
Start: 2022-08-22 | End: 2022-08-22

## 2022-08-22 RX ORDER — MIDAZOLAM HYDROCHLORIDE 1 MG/ML
INJECTION, SOLUTION INTRAMUSCULAR; INTRAVENOUS AS NEEDED
Status: DISCONTINUED | OUTPATIENT
Start: 2022-08-22 | End: 2022-08-22 | Stop reason: HOSPADM

## 2022-08-22 RX ORDER — SODIUM CHLORIDE, SODIUM LACTATE, POTASSIUM CHLORIDE, CALCIUM CHLORIDE 600; 310; 30; 20 MG/100ML; MG/100ML; MG/100ML; MG/100ML
25 INJECTION, SOLUTION INTRAVENOUS CONTINUOUS
Status: DISCONTINUED | OUTPATIENT
Start: 2022-08-22 | End: 2022-08-22 | Stop reason: HOSPADM

## 2022-08-22 RX ORDER — GLYCOPYRROLATE 0.2 MG/ML
INJECTION INTRAMUSCULAR; INTRAVENOUS AS NEEDED
Status: DISCONTINUED | OUTPATIENT
Start: 2022-08-22 | End: 2022-08-22 | Stop reason: HOSPADM

## 2022-08-22 RX ORDER — HYDROMORPHONE HYDROCHLORIDE 1 MG/ML
0.2 INJECTION, SOLUTION INTRAMUSCULAR; INTRAVENOUS; SUBCUTANEOUS
Status: DISCONTINUED | OUTPATIENT
Start: 2022-08-22 | End: 2022-08-22 | Stop reason: HOSPADM

## 2022-08-22 RX ORDER — DEXMEDETOMIDINE HYDROCHLORIDE 100 UG/ML
INJECTION, SOLUTION INTRAVENOUS AS NEEDED
Status: DISCONTINUED | OUTPATIENT
Start: 2022-08-22 | End: 2022-08-22 | Stop reason: HOSPADM

## 2022-08-22 RX ORDER — INSULIN LISPRO 100 [IU]/ML
INJECTION, SOLUTION INTRAVENOUS; SUBCUTANEOUS
Status: DISCONTINUED
Start: 2022-08-22 | End: 2022-08-22 | Stop reason: HOSPADM

## 2022-08-22 RX ORDER — VANCOMYCIN/0.9 % SOD CHLORIDE 1.5G/250ML
1500 PLASTIC BAG, INJECTION (ML) INTRAVENOUS ONCE
Status: COMPLETED | OUTPATIENT
Start: 2022-08-22 | End: 2022-08-22

## 2022-08-22 RX ORDER — SODIUM CHLORIDE 0.9 % (FLUSH) 0.9 %
5-40 SYRINGE (ML) INJECTION AS NEEDED
Status: DISCONTINUED | OUTPATIENT
Start: 2022-08-22 | End: 2022-08-22 | Stop reason: HOSPADM

## 2022-08-22 RX ORDER — FENTANYL CITRATE 50 UG/ML
INJECTION, SOLUTION INTRAMUSCULAR; INTRAVENOUS AS NEEDED
Status: DISCONTINUED | OUTPATIENT
Start: 2022-08-22 | End: 2022-08-22 | Stop reason: HOSPADM

## 2022-08-22 RX ORDER — OXYCODONE HYDROCHLORIDE 5 MG/1
5 TABLET ORAL
Status: DISCONTINUED | OUTPATIENT
Start: 2022-08-22 | End: 2022-08-22 | Stop reason: HOSPADM

## 2022-08-22 RX ORDER — ROCURONIUM BROMIDE 10 MG/ML
INJECTION, SOLUTION INTRAVENOUS AS NEEDED
Status: DISCONTINUED | OUTPATIENT
Start: 2022-08-22 | End: 2022-08-22 | Stop reason: HOSPADM

## 2022-08-22 RX ORDER — DEXAMETHASONE SODIUM PHOSPHATE 4 MG/ML
INJECTION, SOLUTION INTRA-ARTICULAR; INTRALESIONAL; INTRAMUSCULAR; INTRAVENOUS; SOFT TISSUE AS NEEDED
Status: DISCONTINUED | OUTPATIENT
Start: 2022-08-22 | End: 2022-08-22 | Stop reason: HOSPADM

## 2022-08-22 RX ORDER — PROPOFOL 10 MG/ML
INJECTION, EMULSION INTRAVENOUS AS NEEDED
Status: DISCONTINUED | OUTPATIENT
Start: 2022-08-22 | End: 2022-08-22 | Stop reason: HOSPADM

## 2022-08-22 RX ORDER — LIDOCAINE HYDROCHLORIDE 10 MG/ML
0.1 INJECTION, SOLUTION EPIDURAL; INFILTRATION; INTRACAUDAL; PERINEURAL AS NEEDED
Status: DISCONTINUED | OUTPATIENT
Start: 2022-08-22 | End: 2022-08-22 | Stop reason: HOSPADM

## 2022-08-22 RX ORDER — FENTANYL CITRATE 50 UG/ML
25 INJECTION, SOLUTION INTRAMUSCULAR; INTRAVENOUS
Status: DISCONTINUED | OUTPATIENT
Start: 2022-08-22 | End: 2022-08-22 | Stop reason: HOSPADM

## 2022-08-22 RX ORDER — OXYCODONE HYDROCHLORIDE 5 MG/1
TABLET ORAL
Status: COMPLETED
Start: 2022-08-22 | End: 2022-08-22

## 2022-08-22 RX ORDER — BUPIVACAINE HYDROCHLORIDE 5 MG/ML
INJECTION, SOLUTION EPIDURAL; INTRACAUDAL AS NEEDED
Status: DISCONTINUED | OUTPATIENT
Start: 2022-08-22 | End: 2022-08-22 | Stop reason: HOSPADM

## 2022-08-22 RX ORDER — NEOSTIGMINE METHYLSULFATE 1 MG/ML
INJECTION, SOLUTION INTRAVENOUS AS NEEDED
Status: DISCONTINUED | OUTPATIENT
Start: 2022-08-22 | End: 2022-08-22 | Stop reason: HOSPADM

## 2022-08-22 RX ORDER — SUCCINYLCHOLINE CHLORIDE 20 MG/ML
INJECTION INTRAMUSCULAR; INTRAVENOUS AS NEEDED
Status: DISCONTINUED | OUTPATIENT
Start: 2022-08-22 | End: 2022-08-22 | Stop reason: HOSPADM

## 2022-08-22 RX ADMIN — MIDAZOLAM HYDROCHLORIDE 2 MG: 1 INJECTION, SOLUTION INTRAMUSCULAR; INTRAVENOUS at 14:51

## 2022-08-22 RX ADMIN — FENTANYL CITRATE 50 MCG: 50 INJECTION, SOLUTION INTRAMUSCULAR; INTRAVENOUS at 15:50

## 2022-08-22 RX ADMIN — DEXMEDETOMIDINE HYDROCHLORIDE 6 MCG: 100 INJECTION, SOLUTION, CONCENTRATE INTRAVENOUS at 15:34

## 2022-08-22 RX ADMIN — Medication 3.5 MG: at 15:39

## 2022-08-22 RX ADMIN — FENTANYL CITRATE 25 MCG: 0.05 INJECTION, SOLUTION INTRAMUSCULAR; INTRAVENOUS at 16:29

## 2022-08-22 RX ADMIN — FENTANYL CITRATE 50 MCG: 50 INJECTION, SOLUTION INTRAMUSCULAR; INTRAVENOUS at 14:51

## 2022-08-22 RX ADMIN — DEXMEDETOMIDINE HYDROCHLORIDE 4 MCG: 100 INJECTION, SOLUTION, CONCENTRATE INTRAVENOUS at 15:24

## 2022-08-22 RX ADMIN — Medication 7 UNITS: at 14:32

## 2022-08-22 RX ADMIN — DEXAMETHASONE SODIUM PHOSPHATE 4 MG: 4 INJECTION, SOLUTION INTRAMUSCULAR; INTRAVENOUS at 15:24

## 2022-08-22 RX ADMIN — PROPOFOL 150 MG: 10 INJECTION, EMULSION INTRAVENOUS at 15:00

## 2022-08-22 RX ADMIN — MEPERIDINE HYDROCHLORIDE 12.5 MG: 25 INJECTION INTRAMUSCULAR; INTRAVENOUS; SUBCUTANEOUS at 16:21

## 2022-08-22 RX ADMIN — HYDROMORPHONE HYDROCHLORIDE 0.2 MG: 1 INJECTION, SOLUTION INTRAMUSCULAR; INTRAVENOUS; SUBCUTANEOUS at 16:47

## 2022-08-22 RX ADMIN — MEPERIDINE HYDROCHLORIDE 12.5 MG: 25 INJECTION INTRAMUSCULAR; INTRAVENOUS; SUBCUTANEOUS at 16:26

## 2022-08-22 RX ADMIN — Medication 10 UNITS: at 16:17

## 2022-08-22 RX ADMIN — SUCCINYLCHOLINE CHLORIDE 180 MG: 20 INJECTION, SOLUTION INTRAMUSCULAR; INTRAVENOUS at 15:00

## 2022-08-22 RX ADMIN — Medication 3 AMPULE: at 14:19

## 2022-08-22 RX ADMIN — FENTANYL CITRATE 25 MCG: 0.05 INJECTION, SOLUTION INTRAMUSCULAR; INTRAVENOUS at 16:34

## 2022-08-22 RX ADMIN — OXYCODONE HYDROCHLORIDE 5 MG: 5 TABLET ORAL at 17:11

## 2022-08-22 RX ADMIN — GLYCOPYRROLATE 0.4 MG: 0.2 INJECTION, SOLUTION INTRAMUSCULAR; INTRAVENOUS at 15:39

## 2022-08-22 RX ADMIN — SODIUM CHLORIDE, POTASSIUM CHLORIDE, SODIUM LACTATE AND CALCIUM CHLORIDE 25 ML/HR: 600; 310; 30; 20 INJECTION, SOLUTION INTRAVENOUS at 14:20

## 2022-08-22 RX ADMIN — LIDOCAINE HYDROCHLORIDE 40 MG: 20 INJECTION, SOLUTION EPIDURAL; INFILTRATION; INTRACAUDAL; PERINEURAL at 15:00

## 2022-08-22 RX ADMIN — OXYCODONE 5 MG: 5 TABLET ORAL at 17:11

## 2022-08-22 RX ADMIN — FENTANYL CITRATE 25 MCG: 0.05 INJECTION, SOLUTION INTRAMUSCULAR; INTRAVENOUS at 16:13

## 2022-08-22 RX ADMIN — FENTANYL CITRATE 25 MCG: 0.05 INJECTION, SOLUTION INTRAMUSCULAR; INTRAVENOUS at 16:20

## 2022-08-22 RX ADMIN — FENTANYL CITRATE 50 MCG: 50 INJECTION, SOLUTION INTRAMUSCULAR; INTRAVENOUS at 15:09

## 2022-08-22 RX ADMIN — SODIUM CHLORIDE 1500 MG: 9 INJECTION, SOLUTION INTRAVENOUS at 14:19

## 2022-08-22 RX ADMIN — ONDANSETRON HYDROCHLORIDE 4 MG: 2 INJECTION, SOLUTION INTRAMUSCULAR; INTRAVENOUS at 15:24

## 2022-08-22 RX ADMIN — ROCURONIUM BROMIDE 30 MG: 10 INJECTION INTRAVENOUS at 15:09

## 2022-08-22 NOTE — ANESTHESIA POSTPROCEDURE EVALUATION
Procedure(s):  LAPAROSCOPIC CHOLECYSTECTOMY WITH GRAMS. general    Anesthesia Post Evaluation      Multimodal analgesia: multimodal analgesia used between 6 hours prior to anesthesia start to PACU discharge  Patient location during evaluation: PACU  Patient participation: complete - patient participated  Level of consciousness: awake and alert  Pain management: adequate  Airway patency: patent  Anesthetic complications: no  Cardiovascular status: acceptable, hemodynamically stable and blood pressure returned to baseline  Respiratory status: acceptable and room air  Hydration status: euvolemic  Comments: Preop glucose 320, given insulin. PACU glucose 280. Given additional insulin. Last glucose 200. Stable for discharge home. Patient instructed to recheck glucose when she gets home. Post anesthesia nausea and vomiting:  none  Final Post Anesthesia Temperature Assessment:  Normothermia (36.0-37.5 degrees C)      INITIAL Post-op Vital signs:   Vitals Value Taken Time   /71 08/22/22 1731   Temp 36.6 °C (97.8 °F) 08/22/22 1715   Pulse 84 08/22/22 1733   Resp 13 08/22/22 1733   SpO2 92 % 08/22/22 1733   Vitals shown include unvalidated device data.

## 2022-08-22 NOTE — OP NOTES
CHOLECYSTECTOMY OPERATIVE REPORT    8/22/2022        Pre-operative Diagnosis: BILIARY DYSKINESIA    Post-operative Diagnosis: biliary dyskinesia      OPERATIVE PROCEDURE:  1. Laparoscopic cholecystectomy. 2.  Intraoperative cholangiogram with intraoperative interpretation. Surgeon: Landon Plata MD    Assistant: Circ-1: Yosef Costa RN  Scrub Tech-1: Taty Anne  Surg Asst-1: Vear Hashimoto, RN  Surg Asst-Relief: Nona Livers    Anesthesia: General plus Local    Estimated Blood Loss: Minimal    FINDINGS:   The patient was noted to have a gallbladder with thin wall. Intraoperative cholangiogram was obtained. Radiologist was not present during the case. Intraoperative interpretation revealed filling of a normal length cystic duct, a common bile duct, and all proximal and distal structures with free flow of contrast into the duodenum without any filling defects noted. yes and There was limited filling of the proximal ducts due to rapid flow of contrast into the duodenum. The liver appeared normal, and the remaining abdominal cavity appeared normal.    SPECIMEN:    ID Type Source Tests Collected by Time Destination   1 : Gallbladder and contents  Preservative Gallbladder  Isreal Mueller MD 8/22/2022 1522 Pathology        DRAINS:  None. COMPLICATIONS:  None. DESCRIPTION OF PROCEDURE:   Patient was taken to the operating room and placed on the table in supine position. Time-outs were performed using both preinduction and pre-incision safety checklist to verify correct patient, procedure, site, and additional critical information prior to beginning the procedure. General anesthesia was initiated and patient intubated. Patient was then prepped and draped in a sterile fashion. A periumbilical incision was then made. A 5-mm 0-degree laparoscope was inserted into a 5-mm Optiview Trocar. The peritoneum was entered under direct visualization.  The abdomen was then insufflated with carbon dioxide to a pressure of 15 mmHg. The patient tolerated insufflation well. The laparoscope was inserted and the abdomen inspected to ensure no injuries occurred with initial port placement. The table was placed in reverse Trendelenburg with right side up. Additional ports were then placed as follows: a 5-mm subxiphoid port and two percutaneous ports along the right subcostal margin. Inspection of the abdomen showed normal gallbladder. The gallbladder fundus was grasped and retracted cephalad over the liver. Adhesions between the gallbladder and omentum were taken down carefully. The infundibulum was then retracted inferior-laterally to expose Calots triangle. Peritoneum overlying the infundibulum was incised and stripped inferiorly. The cystic duct and artery were identified and dissected circumferentially until the critical view of safety was obtained. A clip was placed on the cystic duct close to the neck of the gallbladder. A nick was made in the cystic duct and a cholangiogram catheter inserted. A cholangiogram was obtained under dynamic fluoroscopy showing good flow of bile into the duodenum, an intact biliary tree, and absence of any filling defects. The cystic duct and artery were then doubly clipped and divided. The gallbladder was then dissected off the liver bed using electrocautery. Hemostasis was achieved. The gallbladder was placed in an endoscopic retrieval bag and removed through the umbilical incision. Irrigation was performed and any spilled stones/bile was suctioned. A Dave-Toby needle was used to pass an 0-Vicryl under direct vision to close the fascia at the umbilical port site. The pneumoperitoneum was evacuated and the ports were removed. The incisions were injected with local anesthetic. The skin of all incisions was re-approximated with subcuticular 4-0 monocryl suture and Dermabond was applied.   A debriefing checklist was completed to share information critical to postoperative care of the patient. The patient was extubated in the OR and transferred to PACU in stable condition. Counts: Instrument, sponge, and needle counts were correct prior to closure and at the conclusion of the case.      Signed By:  Maddi Jimenez MD     August 22, 2022

## 2022-08-22 NOTE — INTERVAL H&P NOTE
Update History & Physical    The Patient's History and Physical was reviewed with the patient and I examined the patient. There was no change. The surgical site was confirmed by the patient and me. Plan:  The risk, benefits, expected outcome, and alternative to the recommended procedure have been discussed with the patient. Patient understands and wants to proceed with the procedure.     Electronically signed by Jakob San MD on 8/22/2022 at 2:09 PM

## 2022-08-22 NOTE — ANESTHESIA PREPROCEDURE EVALUATION
Anesthetic History   No history of anesthetic complications            Review of Systems / Medical History  Patient summary reviewed, nursing notes reviewed and pertinent labs reviewed    Pulmonary        Sleep apnea: CPAP    Asthma : well controlled       Neuro/Psych         Psychiatric history     Cardiovascular    Hypertension              Exercise tolerance: <4 METS  Comments: No ECG on file   GI/Hepatic/Renal         Renal disease: CRI      Comments: Biliary Dyskinesia    Hx Appendectomy (5/23/16) Endo/Other    Diabetes: type 2, using insulin    Morbid obesity     Other Findings            Physical Exam    Airway  Mallampati: I    Neck ROM: normal range of motion   Mouth opening: Normal     Cardiovascular  Regular rate and rhythm,  S1 and S2 normal,  no murmur, click, rub, or gallop             Dental      Comments: 1 missing, none loose   Pulmonary  Breath sounds clear to auscultation               Abdominal  GI exam deferred       Other Findings            Anesthetic Plan    ASA: 3  Anesthesia type: general    Monitoring Plan: BIS      Induction: Intravenous  Anesthetic plan and risks discussed with: Patient      POC glucose 320. Given insulin lispro 7 units.  Will recheck in PACU

## 2022-08-22 NOTE — PERIOP NOTES
Handoff Report from Operating Room to PACU    Report received from [address] and [facility] RN regarding Ann Keene. Surgeon(s):  Coletta Meigs, MD  And Procedure(s) (LRB):  LAPAROSCOPIC CHOLECYSTECTOMY WITH GRAMS (N/A)  confirmed   with allergies and dressings discussed. Anesthesia type, drugs, patient history, complications, estimated blood loss, vital signs, intake and output, and last pain medication, lines, reversal medications, and temperature were reviewed.     1600  Report off to primary nurse Natalie Clark RN

## 2022-08-22 NOTE — DISCHARGE INSTRUCTIONS
Dr. Jody Ruvalcaba Discharge Instructions after  Laparoscopic Cholecystectomy      Mayo Mirza   [id number] : 1980    Admitted 2022 Discharged: 2022       What to do at Home    Recommended diet: Low Fat Diet for 1 month    Recommended activity: as tolerated, do not drive for 3-5 days while on pain medicine. Follow-up with Dr. Heladio Mccrary  in 2 weeks. Call [phone] for an appointment. Cholecystectomy: What to Expect at [facility]    After your surgery, it is normal to feel weak and tired for several days after you return home. Your belly may be swollen. If you had laparoscopic surgery, you may also have pain in your shoulder for about 24 hours. You may have gas or need to burp a lot at first, and a few people get diarrhea. The diarrhea usually goes away in 2 to 4 weeks, but it may last longer. How quickly you recover depends on whether you had a laparoscopic or open surgery. For a laparoscopic surgery, most people can go back to work or their normal routine in 1 to 2 weeks, but it may take longer, depending on the type of work you do. For an open surgery, it will probably take 4 to 6 weeks before you get back to your normal routine. This care sheet gives you a general idea about how long it will take for you to recover. However, each person recovers at a different pace. Follow the steps below to get better as quickly as possible. How can you care for yourself at home? Activity  Rest when you feel tired. Getting enough sleep will help you recover. Try to walk each day. Start out by walking a little more than you did the day before. Gradually increase the amount you walk. Walking boosts blood flow and helps prevent pneumonia and constipation. Avoid strenuous activities, such as biking, jogging, weightlifting, and aerobic exercise, for 1-2 weeks. You may shower 24 hours after surgery. Pat the cut (incision) dry.  Do not take a bath for the first 2 weeks, or until your doctor tells you it is okay. You may drive when you are no longer taking pain medicine and can quickly move your foot from the gas pedal to the brake. You must also be able to sit comfortably for a long period of time, even if you do not plan to go far. For a laparoscopic surgery, most people can go back to work or their normal routine in 1 to 2 weeks, but it may take longer. For an open surgery, it will probably take 4 to 6 weeks before you get back to your normal routine. Diet  Eat smaller meals more often instead of fewer larger meals. You can eat a normal diet, but avoid eating fatty foods for about 1 month. Fatty foods include hamburger, whole milk, cheese, and many snack foods. If your stomach is upset, try bland, low-fat foods like plain rice, broiled chicken, toast, and yogurt. Drink plenty of fluids (unless your doctor tells you not to). If you have diarrhea, try avoiding spicy foods, dairy products, fatty foods, and alcohol. You can also watch to see if specific foods cause it, and stop eating them. If the diarrhea continues for more than 2 weeks, talk to your doctor. You may notice that your bowel movements are not regular right after your surgery. This is common. Try to avoid constipation and straining with bowel movements. You may want to take a fiber supplement every day. If you have not had a bowel movement after a couple of days, ask your doctor about taking a mild laxative. Medicines  Take pain medicines exactly as directed. If the doctor gave you a prescription medicine for pain, take it as prescribed. If you are not taking a prescription pain medicine, take an over-the-counter medicine such as acetaminophen (Tylenol), ibuprofen (Advil, Motrin), or naproxen (Aleve). Read and follow all instructions on the label. Do not take two or more pain medicines at the same time unless the doctor told you to. Many pain medicines contain acetaminophen, which is Tylenol.  Too much Tylenol can be harmful. If you think your pain medicine is making you sick to your stomach: Take your medicine after meals (unless your doctor tells you not to). Ask your doctor for a different pain medicine. If your doctor prescribed antibiotics, take them as directed. Do not stop taking them just because you feel better. You need to take the full course of antibiotics. Incision care  After 24 to 48 hours, wash the area daily with warm, soapy water, and pat it dry. You may have staples to hold the cut together. Keep them dry until your doctor takes them out. This is usually in 7 to 10 days. Keep the area clean and dry. You may cover it with a gauze bandage if it weeps or rubs against clothing. Change the bandage every day. Ice  To reduce swelling and pain, put ice or a cold pack on your belly for 10 to 15 minutes at a time. Do this every 1 to 2 hours. Put a thin cloth between the ice and your skin. Follow-up care is a key part of your treatment and safety. Be sure to make and go to all appointments, and call your doctor if you are having problems. Its also a good idea to know your test results and keep a list of the medicines you take. When should you call for help? Call 911 anytime you think you may need emergency care. For example, call if:  You pass out (lose consciousness). You have severe trouble breathing. You have sudden chest pain and shortness of breath, or you cough up blood. Call your doctor now or seek immediate medical care if:  You are sick to your stomach and cannot drink fluids. You have pain that does not get better when you take your pain medicine. You have signs of infection, such as: Increased pain, warmth, or excessive (>1inch) redness. Red streaks leading from the incision. Pus draining from the incision. Swollen lymph nodes in your neck, armpits, or groin. A fever. Your urine turns dark brown or your stool is light-colored or jer-colored.   Your skin turns yellow. Bright red blood has soaked through a large bandage over your incision. You have signs of a blood clot, such as:  Pain in your calf, back of knee, thigh, or groin. Redness and swelling in your leg or groin. You have trouble passing urine or stool, especially if you have mild pain or swelling in your lower belly. Watch closely for any changes in your health, and be sure to contact your doctor if:  You do not have a bowel movement after taking a laxative. DISCHARGE SUMMARY from Nurse    PATIENT INSTRUCTIONS:    After general anesthesia or intravenous sedation, for 24 hours or while taking prescription narcotics:    Have someone responsible help you with your care  Limit your activities  Do not drive and operate hazardous machinery  Do not make important personal, legal or business decisions  Do not drink alcoholic beverages  If you have not urinated within 8 hours after discharge, please contact your surgeon on call  Resume your medications unless otherwise instructed    From general anesthesia, intravenous sedation, or while taking prescription narcotics, you may experience:    Drowsiness, dizziness, sleepiness, or confusion  Difficulty remembering or delayed reaction times  Difficulty with your balance, especially while walking, move slowly and carefully, do not make sudden position changes  Difficulty focusing or blurred vision    You may not be aware of slight changes in your behavior and/or your reaction time because of the medication used during and after your procedure.     Report the following to your surgeon:  Excessive pain, swelling, redness or odor of or around the surgical area  Temperature over 100.5  Nausea and vomiting lasting longer than 4 hours or if unable to take medications  Any signs of decreased circulation or nerve impairment to extremity: change in color, persistent numbness, tingling, coldness or increase pain  Any questions or concerns         IF YOU REPORT TO AN EMERGENCY ROOM, DOCTOR'S OFFICE OR HOSPITAL WITHIN 24 HOURS AFTER YOUR PROCEDURE, BRING THIS SHEET AND YOUR AFTER VISIT SUMMARY WITH YOU AND GIVE IT TO THE PHYSICIAN OR NURSE ATTENDING YOU. These are general instructions for a healthy lifestyle (if applicable): No smoking/ No tobacco products/ Avoid exposure to secondhand smoke  Surgeon General's Warning:  Quitting smoking now greatly reduces serious risk to your health. Obesity, smoking, and sedentary lifestyle greatly increases your risk for illness    A healthy diet, regular physical exercise & weight monitoring are important for maintaining a healthy lifestyle    You may be retaining fluid if you have a history of heart failure or if you experience any of the following symptoms:  Weight gain of 3 pounds or more overnight or 5 pounds in a week, increased swelling in our hands or feet or shortness of breath while lying flat in bed. Please call your doctor as soon as you notice any of these symptoms; do not wait until your next office visit. A common side effect of anesthesia following surgery is nausea and/or vomiting. In order to decrease symptoms, it is wise to avoid foods that are high in fat, greasy foods, milk products, and spicy foods for the first 24 hours. Acceptable foods for the first 24 hours following surgery include but are not limited to:    soup  broth  toast   crackers   applesauce  bananas   mashed potatoes,  soft or scrambled eggs  oatmeal  jello    It is important to eat when taking your pain medication. This will help to prevent nausea. If possible, please try to time your meals with your medications. It is very important to stay hydrated following surgery. Sip fluids frequently while awake. Avoid acidic drinks such as citrus juices and soda for 24 hours. Carbonated beverages may cause bloating and gas.  Acceptable fluids include:    water (flavor packets may add variety)  coffee or tea (in moderation)  Gatorade  Walter-Aid  apple juice  cranberry juice    You are encouraged to cough and deep breathe every hour when awake. This will help to prevent respiratory complications following anesthesia. You may want to hug a pillow when coughing and sneezing to add additional support to the surgical area and to decrease discomfort if you had abdominal or chest surgery. If you are discharged home with support stockings, you may remove them after 24 hours. Support stockings are used to help prevent blood clots in the legs following surgery. TO PREVENT AN INFECTION      [facility] YOUR HANDS    To prevent infection, good handwashing is the most important thing you or your caregiver can do. Wash your hands with soap and water or use the hand  we gave you before you touch any wounds. SHOWER    Use the antibacterial soap we gave you when you take a shower. Shower with this soap until your wounds are healed. To reach all areas of your body, you may need someone to help you. Dont forget to clean your belly button with every shower. USE CLEAN SHEETS    Use freshly cleaned sheets on your bed after surgery. To keep the surgery site clean, do not allow pets to sleep with you while your wound is still healing. STOP SMOKING    Stop smoking, or at least cut back on smoking    Smoking slows your healing. CONTROL YOUR BLOOD SUGAR    High blood sugars slow wound healing. If you are diabetic, control your blood sugar levels before and after your surgery. Please take time to review all of your Home Care Instructions and Medication Information sheets provided in your discharge packet. If you have any questions, please contact your surgeon's office. Thank you. The discharge information has been reviewed with the patient and instruction recipient. The patient and instruction recipient verbalized understanding.   Discharge medications reviewed with the patient and instruction recipient and appropriate educational materials and side effects teaching were provided. Please provide this summary of care documentation to your next provider.

## 2022-09-06 ENCOUNTER — OFFICE VISIT (OUTPATIENT)
Dept: SURGERY | Age: 42
End: 2022-09-06
Payer: COMMERCIAL

## 2022-09-06 VITALS
DIASTOLIC BLOOD PRESSURE: 74 MMHG | HEIGHT: 62 IN | SYSTOLIC BLOOD PRESSURE: 139 MMHG | RESPIRATION RATE: 18 BRPM | HEART RATE: 90 BPM | TEMPERATURE: 96.8 F | OXYGEN SATURATION: 96 % | BODY MASS INDEX: 53.92 KG/M2 | WEIGHT: 293 LBS

## 2022-09-06 DIAGNOSIS — K82.8 BILIARY DYSKINESIA: Primary | ICD-10-CM

## 2022-09-06 PROCEDURE — 99024 POSTOP FOLLOW-UP VISIT: CPT | Performed by: SURGERY

## 2022-09-06 RX ORDER — BLOOD-GLUCOSE TRANSMITTER
EACH MISCELLANEOUS
COMMUNITY
Start: 2022-08-17 | End: 2022-09-19

## 2022-09-06 RX ORDER — BLOOD-GLUCOSE,RECEIVER,CONT
EACH MISCELLANEOUS SEE ADMIN INSTRUCTIONS
COMMUNITY
Start: 2022-08-19

## 2022-09-06 RX ORDER — BLOOD-GLUCOSE SENSOR
EACH MISCELLANEOUS
COMMUNITY
Start: 2022-08-26 | End: 2022-09-19

## 2022-09-06 NOTE — PROGRESS NOTES
Subjective:      Willy Johnson is a 38 y.o. female presents for postop care s/p lap shayan with ioc. Pathology reviewed. Tolerating a diet and having bowel function. Preoperative symptoms resolved. Objective:     Visit Vitals  /74 (BP 1 Location: Right upper arm, BP Patient Position: Sitting, BP Cuff Size: Large adult)   Pulse 90   Temp 96.8 °F (36 °C) (Temporal)   Resp 18   Ht 5' 2\" (1.575 m)   Wt 133.2 kg (293 lb 9.6 oz)   LMP 09/02/2022   SpO2 96%   BMI 53.70 kg/m²       General:  alert, cooperative, no distress, appears stated age   Abdomen: soft, bowel sounds active, non-tender   Incision:   healing well, no drainage, no erythema, no hernia, no seroma, no swelling, no dehiscence, incision well approximated     Assessment:     Doing well postoperatively. Plan:     1. Continue any current medications. 2. Wound care discussed. 3. Pt is to increase activities as tolerated.    4. Follow up osman Hernandez MD

## 2022-09-06 NOTE — LETTER
9/6/2022    Patient: Anita Barrett   YOB: 1980   Date of Visit: 9/6/2022     Richmond Johnson, [address]  [address] [id number]  [address] [facility] [address] [address]  [address] [facility] [address]    Dear MD Zeyad Clark MD,      Thank you for referring Ms. Anita Barrett to [address] for evaluation. My notes for this consultation are attached. If you have questions, please do not hesitate to call me. I look forward to following your patient along with you.       Sincerely,    Wilian Parra MD

## 2022-09-06 NOTE — PROGRESS NOTES
Chief Complaint   Patient presents with    Surgical Follow-up     LAPAROSCOPIC CHOLECYSTECTOMY WITH GRAMS 8/22/22       1. Have you been to the ER, urgent care clinic since your last visit? Hospitalized since your last visit? No    2. Have you seen or consulted any other health care providers outside of the [address] [place] [address] since your last visit? Include any pap smears or colon screening.  No

## 2022-09-18 ENCOUNTER — HOSPITAL ENCOUNTER (INPATIENT)
Age: 42
LOS: 5 days | Discharge: HOME | DRG: 074 | End: 2022-09-23
Attending: EMERGENCY MEDICINE | Admitting: INTERNAL MEDICINE
Payer: COMMERCIAL

## 2022-09-18 ENCOUNTER — APPOINTMENT (OUTPATIENT)
Dept: CT IMAGING | Age: 42
DRG: 074 | End: 2022-09-18
Attending: EMERGENCY MEDICINE
Payer: COMMERCIAL

## 2022-09-18 DIAGNOSIS — R11.2 INTRACTABLE NAUSEA AND VOMITING: Primary | ICD-10-CM

## 2022-09-18 DIAGNOSIS — R73.9 HYPERGLYCEMIA: ICD-10-CM

## 2022-09-18 DIAGNOSIS — R10.13 ABDOMINAL PAIN, EPIGASTRIC: ICD-10-CM

## 2022-09-18 DIAGNOSIS — N17.9 AKI (ACUTE KIDNEY INJURY) (HCC): ICD-10-CM

## 2022-09-18 LAB
ALBUMIN SERPL-MCNC: 3.2 G/DL (ref 3.5–5)
ALBUMIN/GLOB SERPL: 0.7 {RATIO} (ref 1.1–2.2)
ALP SERPL-CCNC: 98 U/L (ref 45–117)
ALT SERPL-CCNC: 28 U/L (ref 12–78)
ANION GAP SERPL CALC-SCNC: 10 MMOL/L (ref 5–15)
APPEARANCE UR: CLEAR
AST SERPL-CCNC: 13 U/L (ref 15–37)
BACTERIA URNS QL MICRO: NEGATIVE /HPF
BASOPHILS # BLD: 0.1 K/UL (ref 0–0.1)
BASOPHILS NFR BLD: 0 % (ref 0–1)
BILIRUB SERPL-MCNC: 0.5 MG/DL (ref 0.2–1)
BILIRUB UR QL: NEGATIVE
BUN SERPL-MCNC: 35 MG/DL (ref 6–20)
BUN/CREAT SERPL: 19 (ref 12–20)
CALCIUM SERPL-MCNC: 9.3 MG/DL (ref 8.5–10.1)
CHLORIDE SERPL-SCNC: 103 MMOL/L (ref 97–108)
CO2 SERPL-SCNC: 22 MMOL/L (ref 21–32)
COLOR UR: ABNORMAL
CREAT SERPL-MCNC: 1.89 MG/DL (ref 0.55–1.02)
DIFFERENTIAL METHOD BLD: ABNORMAL
EOSINOPHIL # BLD: 0 K/UL (ref 0–0.4)
EOSINOPHIL NFR BLD: 0 % (ref 0–7)
EPITH CASTS URNS QL MICRO: ABNORMAL /LPF
ERYTHROCYTE [DISTWIDTH] IN BLOOD BY AUTOMATED COUNT: 14.3 % (ref 11.5–14.5)
GLOBULIN SER CALC-MCNC: 4.9 G/DL (ref 2–4)
GLUCOSE SERPL-MCNC: 361 MG/DL (ref 65–100)
GLUCOSE UR STRIP.AUTO-MCNC: >1000 MG/DL
HCT VFR BLD AUTO: 33.6 % (ref 35–47)
HGB BLD-MCNC: 11 G/DL (ref 11.5–16)
HGB UR QL STRIP: ABNORMAL
HYALINE CASTS URNS QL MICRO: ABNORMAL /LPF (ref 0–2)
IMM GRANULOCYTES # BLD AUTO: 0.1 K/UL (ref 0–0.04)
IMM GRANULOCYTES NFR BLD AUTO: 1 % (ref 0–0.5)
KETONES UR QL STRIP.AUTO: 40 MG/DL
LEUKOCYTE ESTERASE UR QL STRIP.AUTO: NEGATIVE
LIPASE SERPL-CCNC: 133 U/L (ref 73–393)
LYMPHOCYTES # BLD: 2.5 K/UL (ref 0.8–3.5)
LYMPHOCYTES NFR BLD: 13 % (ref 12–49)
MCH RBC QN AUTO: 27.3 PG (ref 26–34)
MCHC RBC AUTO-ENTMCNC: 32.7 G/DL (ref 30–36.5)
MCV RBC AUTO: 83.4 FL (ref 80–99)
MONOCYTES # BLD: 0.5 K/UL (ref 0–1)
MONOCYTES NFR BLD: 3 % (ref 5–13)
NEUTS SEG # BLD: 15.8 K/UL (ref 1.8–8)
NEUTS SEG NFR BLD: 83 % (ref 32–75)
NITRITE UR QL STRIP.AUTO: NEGATIVE
NRBC # BLD: 0 K/UL (ref 0–0.01)
NRBC BLD-RTO: 0 PER 100 WBC
PH UR STRIP: 5.5 [PH] (ref 5–8)
PLATELET # BLD AUTO: 403 K/UL (ref 150–400)
PMV BLD AUTO: 9.3 FL (ref 8.9–12.9)
POTASSIUM SERPL-SCNC: 4.5 MMOL/L (ref 3.5–5.1)
PROT SERPL-MCNC: 8.1 G/DL (ref 6.4–8.2)
PROT UR STRIP-MCNC: 300 MG/DL
RBC # BLD AUTO: 4.03 M/UL (ref 3.8–5.2)
RBC #/AREA URNS HPF: ABNORMAL /HPF (ref 0–5)
SODIUM SERPL-SCNC: 135 MMOL/L (ref 136–145)
SP GR UR REFRACTOMETRY: 1.02
UA: UC IF INDICATED,UAUC: ABNORMAL
UROBILINOGEN UR QL STRIP.AUTO: 1 EU/DL (ref 0.2–1)
WBC # BLD AUTO: 19 K/UL (ref 3.6–11)
WBC URNS QL MICRO: ABNORMAL /HPF (ref 0–4)

## 2022-09-18 PROCEDURE — 85025 COMPLETE CBC W/AUTO DIFF WBC: CPT

## 2022-09-18 PROCEDURE — 36415 COLL VENOUS BLD VENIPUNCTURE: CPT

## 2022-09-18 PROCEDURE — 81001 URINALYSIS AUTO W/SCOPE: CPT

## 2022-09-18 PROCEDURE — 83690 ASSAY OF LIPASE: CPT

## 2022-09-18 PROCEDURE — 74176 CT ABD & PELVIS W/O CONTRAST: CPT

## 2022-09-18 PROCEDURE — 96375 TX/PRO/DX INJ NEW DRUG ADDON: CPT

## 2022-09-18 PROCEDURE — 99285 EMERGENCY DEPT VISIT HI MDM: CPT

## 2022-09-18 PROCEDURE — 74011250636 HC RX REV CODE- 250/636: Performed by: EMERGENCY MEDICINE

## 2022-09-18 PROCEDURE — 65270000029 HC RM PRIVATE

## 2022-09-18 PROCEDURE — 96361 HYDRATE IV INFUSION ADD-ON: CPT

## 2022-09-18 PROCEDURE — 96374 THER/PROPH/DIAG INJ IV PUSH: CPT

## 2022-09-18 PROCEDURE — 80053 COMPREHEN METABOLIC PANEL: CPT

## 2022-09-18 PROCEDURE — 5A09357 ASSISTANCE WITH RESPIRATORY VENTILATION, LESS THAN 24 CONSECUTIVE HOURS, CONTINUOUS POSITIVE AIRWAY PRESSURE: ICD-10-PCS | Performed by: INTERNAL MEDICINE

## 2022-09-18 RX ORDER — DROPERIDOL 2.5 MG/ML
1.25 INJECTION, SOLUTION INTRAMUSCULAR; INTRAVENOUS ONCE
Status: COMPLETED | OUTPATIENT
Start: 2022-09-18 | End: 2022-09-18

## 2022-09-18 RX ORDER — ONDANSETRON 2 MG/ML
4 INJECTION INTRAMUSCULAR; INTRAVENOUS
Status: COMPLETED | OUTPATIENT
Start: 2022-09-18 | End: 2022-09-18

## 2022-09-18 RX ADMIN — SODIUM CHLORIDE 1000 ML: 9 INJECTION, SOLUTION INTRAVENOUS at 20:10

## 2022-09-18 RX ADMIN — DROPERIDOL 1.25 MG: 2.5 INJECTION, SOLUTION INTRAMUSCULAR; INTRAVENOUS at 21:02

## 2022-09-18 RX ADMIN — ONDANSETRON 4 MG: 2 INJECTION INTRAMUSCULAR; INTRAVENOUS at 20:10

## 2022-09-19 PROBLEM — R11.2 INTRACTABLE NAUSEA AND VOMITING: Status: ACTIVE | Noted: 2022-09-19

## 2022-09-19 LAB
ANION GAP SERPL CALC-SCNC: 6 MMOL/L (ref 5–15)
ATRIAL RATE: 95 BPM
BASOPHILS # BLD: 0.1 K/UL (ref 0–0.1)
BASOPHILS NFR BLD: 0 % (ref 0–1)
BUN SERPL-MCNC: 39 MG/DL (ref 6–20)
BUN/CREAT SERPL: 17 (ref 12–20)
CALCIUM SERPL-MCNC: 8.9 MG/DL (ref 8.5–10.1)
CALCULATED P AXIS, ECG09: 54 DEGREES
CALCULATED R AXIS, ECG10: 7 DEGREES
CALCULATED T AXIS, ECG11: 27 DEGREES
CHLORIDE SERPL-SCNC: 103 MMOL/L (ref 97–108)
CO2 SERPL-SCNC: 27 MMOL/L (ref 21–32)
COMMENT, HOLDF: NORMAL
CREAT SERPL-MCNC: 2.31 MG/DL (ref 0.55–1.02)
DIAGNOSIS, 93000: NORMAL
DIFFERENTIAL METHOD BLD: ABNORMAL
EOSINOPHIL # BLD: 0 K/UL (ref 0–0.4)
EOSINOPHIL NFR BLD: 0 % (ref 0–7)
ERYTHROCYTE [DISTWIDTH] IN BLOOD BY AUTOMATED COUNT: 14.2 % (ref 11.5–14.5)
EST. AVERAGE GLUCOSE BLD GHB EST-MCNC: 186 MG/DL
GLUCOSE BLD STRIP.AUTO-MCNC: 104 MG/DL (ref 65–117)
GLUCOSE BLD STRIP.AUTO-MCNC: 149 MG/DL (ref 65–117)
GLUCOSE BLD STRIP.AUTO-MCNC: 248 MG/DL (ref 65–117)
GLUCOSE BLD STRIP.AUTO-MCNC: 291 MG/DL (ref 65–117)
GLUCOSE BLD STRIP.AUTO-MCNC: 87 MG/DL (ref 65–117)
GLUCOSE SERPL-MCNC: 328 MG/DL (ref 65–100)
HBA1C MFR BLD: 8.1 % (ref 4–5.6)
HCT VFR BLD AUTO: 32.5 % (ref 35–47)
HGB BLD-MCNC: 10.3 G/DL (ref 11.5–16)
IMM GRANULOCYTES # BLD AUTO: 0.2 K/UL (ref 0–0.04)
IMM GRANULOCYTES NFR BLD AUTO: 1 % (ref 0–0.5)
LYMPHOCYTES # BLD: 4.1 K/UL (ref 0.8–3.5)
LYMPHOCYTES NFR BLD: 22 % (ref 12–49)
MAGNESIUM SERPL-MCNC: 2.1 MG/DL (ref 1.6–2.4)
MCH RBC QN AUTO: 27 PG (ref 26–34)
MCHC RBC AUTO-ENTMCNC: 31.7 G/DL (ref 30–36.5)
MCV RBC AUTO: 85.3 FL (ref 80–99)
MONOCYTES # BLD: 1.1 K/UL (ref 0–1)
MONOCYTES NFR BLD: 6 % (ref 5–13)
NEUTS SEG # BLD: 13.5 K/UL (ref 1.8–8)
NEUTS SEG NFR BLD: 71 % (ref 32–75)
NRBC # BLD: 0 K/UL (ref 0–0.01)
NRBC BLD-RTO: 0 PER 100 WBC
P-R INTERVAL, ECG05: 120 MS
PHOSPHATE SERPL-MCNC: 3.9 MG/DL (ref 2.6–4.7)
PLATELET # BLD AUTO: 388 K/UL (ref 150–400)
PMV BLD AUTO: 9 FL (ref 8.9–12.9)
POTASSIUM SERPL-SCNC: 4.1 MMOL/L (ref 3.5–5.1)
Q-T INTERVAL, ECG07: 372 MS
QRS DURATION, ECG06: 82 MS
QTC CALCULATION (BEZET), ECG08: 467 MS
RBC # BLD AUTO: 3.81 M/UL (ref 3.8–5.2)
SAMPLES BEING HELD,HOLD: NORMAL
SERVICE CMNT-IMP: ABNORMAL
SERVICE CMNT-IMP: NORMAL
SERVICE CMNT-IMP: NORMAL
SODIUM SERPL-SCNC: 136 MMOL/L (ref 136–145)
VENTRICULAR RATE, ECG03: 95 BPM
WBC # BLD AUTO: 18.9 K/UL (ref 3.6–11)

## 2022-09-19 PROCEDURE — 94760 N-INVAS EAR/PLS OXIMETRY 1: CPT

## 2022-09-19 PROCEDURE — 96375 TX/PRO/DX INJ NEW DRUG ADDON: CPT

## 2022-09-19 PROCEDURE — 36415 COLL VENOUS BLD VENIPUNCTURE: CPT

## 2022-09-19 PROCEDURE — 74011636637 HC RX REV CODE- 636/637: Performed by: STUDENT IN AN ORGANIZED HEALTH CARE EDUCATION/TRAINING PROGRAM

## 2022-09-19 PROCEDURE — 77010033678 HC OXYGEN DAILY

## 2022-09-19 PROCEDURE — 80048 BASIC METABOLIC PNL TOTAL CA: CPT

## 2022-09-19 PROCEDURE — 83036 HEMOGLOBIN GLYCOSYLATED A1C: CPT

## 2022-09-19 PROCEDURE — 83735 ASSAY OF MAGNESIUM: CPT

## 2022-09-19 PROCEDURE — 74011250636 HC RX REV CODE- 250/636: Performed by: FAMILY MEDICINE

## 2022-09-19 PROCEDURE — 94660 CPAP INITIATION&MGMT: CPT

## 2022-09-19 PROCEDURE — G0378 HOSPITAL OBSERVATION PER HR: HCPCS

## 2022-09-19 PROCEDURE — 65270000029 HC RM PRIVATE

## 2022-09-19 PROCEDURE — 74011250637 HC RX REV CODE- 250/637: Performed by: STUDENT IN AN ORGANIZED HEALTH CARE EDUCATION/TRAINING PROGRAM

## 2022-09-19 PROCEDURE — 74011000250 HC RX REV CODE- 250: Performed by: STUDENT IN AN ORGANIZED HEALTH CARE EDUCATION/TRAINING PROGRAM

## 2022-09-19 PROCEDURE — 82962 GLUCOSE BLOOD TEST: CPT

## 2022-09-19 PROCEDURE — 74011250636 HC RX REV CODE- 250/636: Performed by: SPECIALIST

## 2022-09-19 PROCEDURE — 74011250636 HC RX REV CODE- 250/636: Performed by: EMERGENCY MEDICINE

## 2022-09-19 PROCEDURE — 93005 ELECTROCARDIOGRAM TRACING: CPT

## 2022-09-19 PROCEDURE — 84100 ASSAY OF PHOSPHORUS: CPT

## 2022-09-19 PROCEDURE — 85025 COMPLETE CBC W/AUTO DIFF WBC: CPT

## 2022-09-19 RX ORDER — INSULIN LISPRO 100 [IU]/ML
20 INJECTION, SOLUTION INTRAVENOUS; SUBCUTANEOUS
Status: DISCONTINUED | OUTPATIENT
Start: 2022-09-19 | End: 2022-09-19

## 2022-09-19 RX ORDER — ACETAMINOPHEN 325 MG/1
650 TABLET ORAL
Status: DISCONTINUED | OUTPATIENT
Start: 2022-09-19 | End: 2022-09-23 | Stop reason: HOSPADM

## 2022-09-19 RX ORDER — POLYETHYLENE GLYCOL 3350 17 G/17G
17 POWDER, FOR SOLUTION ORAL DAILY PRN
Status: DISCONTINUED | OUTPATIENT
Start: 2022-09-19 | End: 2022-09-23 | Stop reason: HOSPADM

## 2022-09-19 RX ORDER — DEXTROSE MONOHYDRATE 100 MG/ML
0-250 INJECTION, SOLUTION INTRAVENOUS AS NEEDED
Status: DISCONTINUED | OUTPATIENT
Start: 2022-09-19 | End: 2022-09-19

## 2022-09-19 RX ORDER — INSULIN LISPRO 100 [IU]/ML
INJECTION, SOLUTION INTRAVENOUS; SUBCUTANEOUS EVERY 4 HOURS
Status: DISCONTINUED | OUTPATIENT
Start: 2022-09-19 | End: 2022-09-19

## 2022-09-19 RX ORDER — ENOXAPARIN SODIUM 100 MG/ML
40 INJECTION SUBCUTANEOUS DAILY
Status: DISCONTINUED | OUTPATIENT
Start: 2022-09-19 | End: 2022-09-20 | Stop reason: DRUGHIGH

## 2022-09-19 RX ORDER — METOCLOPRAMIDE HYDROCHLORIDE 5 MG/ML
10 INJECTION INTRAMUSCULAR; INTRAVENOUS EVERY 6 HOURS
Status: DISCONTINUED | OUTPATIENT
Start: 2022-09-19 | End: 2022-09-23 | Stop reason: HOSPADM

## 2022-09-19 RX ORDER — MAGNESIUM SULFATE 100 %
4 CRYSTALS MISCELLANEOUS AS NEEDED
Status: DISCONTINUED | OUTPATIENT
Start: 2022-09-19 | End: 2022-09-19

## 2022-09-19 RX ORDER — ONDANSETRON 4 MG/1
4 TABLET, ORALLY DISINTEGRATING ORAL
Status: DISCONTINUED | OUTPATIENT
Start: 2022-09-19 | End: 2022-09-20

## 2022-09-19 RX ORDER — SODIUM CHLORIDE 9 MG/ML
100 INJECTION, SOLUTION INTRAVENOUS CONTINUOUS
Status: DISCONTINUED | OUTPATIENT
Start: 2022-09-19 | End: 2022-09-23 | Stop reason: HOSPADM

## 2022-09-19 RX ORDER — INSULIN GLARGINE 100 [IU]/ML
60 INJECTION, SOLUTION SUBCUTANEOUS
Status: DISCONTINUED | OUTPATIENT
Start: 2022-09-19 | End: 2022-09-23 | Stop reason: HOSPADM

## 2022-09-19 RX ORDER — ACETAMINOPHEN 650 MG/1
650 SUPPOSITORY RECTAL
Status: DISCONTINUED | OUTPATIENT
Start: 2022-09-19 | End: 2022-09-23 | Stop reason: HOSPADM

## 2022-09-19 RX ORDER — METOCLOPRAMIDE HYDROCHLORIDE 5 MG/ML
10 INJECTION INTRAMUSCULAR; INTRAVENOUS
Status: COMPLETED | OUTPATIENT
Start: 2022-09-19 | End: 2022-09-19

## 2022-09-19 RX ORDER — INSULIN LISPRO 100 [IU]/ML
INJECTION, SOLUTION INTRAVENOUS; SUBCUTANEOUS
Status: DISCONTINUED | OUTPATIENT
Start: 2022-09-19 | End: 2022-09-23 | Stop reason: HOSPADM

## 2022-09-19 RX ORDER — DEXTROSE MONOHYDRATE 100 MG/ML
0-250 INJECTION, SOLUTION INTRAVENOUS AS NEEDED
Status: DISCONTINUED | OUTPATIENT
Start: 2022-09-19 | End: 2022-09-23 | Stop reason: HOSPADM

## 2022-09-19 RX ORDER — METOCLOPRAMIDE 10 MG/1
5 TABLET ORAL
Status: DISCONTINUED | OUTPATIENT
Start: 2022-09-19 | End: 2022-09-19

## 2022-09-19 RX ORDER — SODIUM CHLORIDE 9 MG/ML
100 INJECTION, SOLUTION INTRAVENOUS CONTINUOUS
Status: DISCONTINUED | OUTPATIENT
Start: 2022-09-19 | End: 2022-09-19

## 2022-09-19 RX ORDER — INSULIN LISPRO 100 [IU]/ML
INJECTION, SOLUTION INTRAVENOUS; SUBCUTANEOUS
Status: DISCONTINUED | OUTPATIENT
Start: 2022-09-19 | End: 2022-09-19

## 2022-09-19 RX ORDER — PANTOPRAZOLE SODIUM 40 MG/1
40 TABLET, DELAYED RELEASE ORAL 2 TIMES DAILY
Status: DISCONTINUED | OUTPATIENT
Start: 2022-09-19 | End: 2022-09-23 | Stop reason: HOSPADM

## 2022-09-19 RX ORDER — ONDANSETRON 2 MG/ML
4 INJECTION INTRAMUSCULAR; INTRAVENOUS
Status: DISCONTINUED | OUTPATIENT
Start: 2022-09-19 | End: 2022-09-20

## 2022-09-19 RX ORDER — SODIUM CHLORIDE 0.9 % (FLUSH) 0.9 %
5-40 SYRINGE (ML) INJECTION EVERY 8 HOURS
Status: DISCONTINUED | OUTPATIENT
Start: 2022-09-19 | End: 2022-09-23 | Stop reason: HOSPADM

## 2022-09-19 RX ORDER — ONDANSETRON 2 MG/ML
4 INJECTION INTRAMUSCULAR; INTRAVENOUS
Status: DISCONTINUED | OUTPATIENT
Start: 2022-09-19 | End: 2022-09-19 | Stop reason: SDUPTHER

## 2022-09-19 RX ORDER — MAGNESIUM SULFATE 100 %
4 CRYSTALS MISCELLANEOUS AS NEEDED
Status: DISCONTINUED | OUTPATIENT
Start: 2022-09-19 | End: 2022-09-23 | Stop reason: HOSPADM

## 2022-09-19 RX ORDER — SODIUM CHLORIDE 0.9 % (FLUSH) 0.9 %
5-40 SYRINGE (ML) INJECTION AS NEEDED
Status: DISCONTINUED | OUTPATIENT
Start: 2022-09-19 | End: 2022-09-23 | Stop reason: HOSPADM

## 2022-09-19 RX ORDER — INSULIN LISPRO 100 [IU]/ML
INJECTION, SOLUTION INTRAVENOUS; SUBCUTANEOUS
Status: DISCONTINUED | OUTPATIENT
Start: 2022-09-19 | End: 2022-09-19 | Stop reason: SDUPTHER

## 2022-09-19 RX ADMIN — PANTOPRAZOLE SODIUM 40 MG: 40 TABLET, DELAYED RELEASE ORAL at 08:45

## 2022-09-19 RX ADMIN — METOCLOPRAMIDE 5 MG: 10 TABLET ORAL at 13:24

## 2022-09-19 RX ADMIN — SODIUM CHLORIDE, PRESERVATIVE FREE 10 ML: 5 INJECTION INTRAVENOUS at 13:25

## 2022-09-19 RX ADMIN — Medication 4 UNITS: at 13:24

## 2022-09-19 RX ADMIN — VORTIOXETINE 10 MG: 10 TABLET, FILM COATED ORAL at 09:50

## 2022-09-19 RX ADMIN — SODIUM CHLORIDE 100 ML/HR: 9 INJECTION, SOLUTION INTRAVENOUS at 13:25

## 2022-09-19 RX ADMIN — METOCLOPRAMIDE 5 MG: 10 TABLET ORAL at 08:44

## 2022-09-19 RX ADMIN — METOCLOPRAMIDE 10 MG: 5 INJECTION, SOLUTION INTRAMUSCULAR; INTRAVENOUS at 21:18

## 2022-09-19 RX ADMIN — PANTOPRAZOLE SODIUM 40 MG: 40 TABLET, DELAYED RELEASE ORAL at 21:18

## 2022-09-19 RX ADMIN — INSULIN GLARGINE 60 UNITS: 100 INJECTION, SOLUTION SUBCUTANEOUS at 07:06

## 2022-09-19 RX ADMIN — SODIUM CHLORIDE, PRESERVATIVE FREE 10 ML: 5 INJECTION INTRAVENOUS at 08:45

## 2022-09-19 RX ADMIN — Medication 4 UNITS: at 07:09

## 2022-09-19 RX ADMIN — Medication 20 UNITS: at 08:46

## 2022-09-19 RX ADMIN — METOCLOPRAMIDE 10 MG: 5 INJECTION, SOLUTION INTRAMUSCULAR; INTRAVENOUS at 00:33

## 2022-09-19 NOTE — ED PROVIDER NOTES
EMERGENCY DEPARTMENT HISTORY AND PHYSICAL EXAM      Date: 9/18/2022  Patient Name: Guy Franklin    History of Presenting Illness     Chief Complaint   Patient presents with    Vomiting     Vomiting and nausea and pain onset Friday afternoon; pt had her gallbladder removed three weeks ago. Pt had a Gastric Emptying study on Friday at [facility]. She called on call for GI and the doctor advised to come to Ed. Pt is hyperventilating. Abdominal Pain       History Provided By: Patient    HPI: Guy Franklin, 38 y.o. female with history of type 2 diabetes, obesity, hypertension, asthma, anxiety, biliary dyskinesia status post lap cholecystectomy August 2022 presents to the ED with cc of nausea, vomiting, epigastric abdominal pain. Symptoms have been persistent over the past 2 days. Symptoms are after she had a gastric emptying study at [facility] this past Friday. She did not receive the results of this gastric emptying study. She denies prior history of gastroparesis. She endorses persistent epigastric pain associate with nausea and vomiting, states this feels similar to her biliary dyskinesia. Denies fevers, chills, urinary symptoms, chest pain, shortness of breath. She follows with [facility]. There are no other complaints, changes, or physical findings at this time. PCP: Dustin Ruiz MD    No current facility-administered medications on file prior to encounter. Current Outpatient Medications on File Prior to Encounter   Medication Sig Dispense Refill    Dexcom G6  misc See Admin Instructions. Dexcom G6 Sensor leonie       Dexcom G6 Transmitter leonie       Trintellix 10 mg tablet Take 10 mg by mouth daily. dicyclomine (BENTYL) 10 mg capsule Take 10 mg by mouth four (4) times daily as needed. pantoprazole (PROTONIX) 40 mg tablet Take 40 mg by mouth two (2) times a day.       ondansetron (ZOFRAN ODT) 4 mg disintegrating tablet Take 1 Tablet by mouth every eight (8) hours as needed for Nausea or Vomiting. 10 Tablet 0    glucose blood VI test strips (OneTouch Verio test strips) strip Test blood sugars 3 times daily 300 Strip 3    insulin glargine (Lantus Solostar U-100 Insulin) 100 unit/mL (3 mL) inpn INJECT 100 UNITS SUBCUTANEOUSLY ROUTE DAILY 30 mL 10    FreeStyle Reji 14 Day Sensor kit CHANGE 1 SENSOR EVERY 14 DAYS (Patient not taking: Reported on 2022) 2 Kit 11    insulin lispro (HumaLOG KwikPen Insulin) 200 unit/mL (3 mL) inpn INJECT 20-40 UNITS THREE TIMES A DAY BEFORE MEALS 90 mL 3    losartan (COZAAR) 50 mg tablet Take 1 Tablet by mouth two (2) times a day. 180 Tablet 3    Insulin Needles, Disposable, (Trina Pen Needle) 32 gauge x 5/32\" ndle For insulin up to 5 times daily 200 Pen Needle 3    Blood-Glucose Meter misc by Does Not Apply route. Checks Blood sugar once a month (Patient not taking: Reported on 2022)      Insulin Needles, Disposable, (TRINA PEN NEEDLE) 32 gauge x 5/32\" ndle 4 times daily 150 Pen Needle 11    Insulin Syringe-Needle U-100 (BD INSULIN SYRINGE ULT-FINE II) 1 mL 31 x 5/16\" Syrg 1 Each by Does Not Apply route three (3) times daily. 100 Syringe 10    Lancets (ONE TOUCH DELICA) Misc by Does Not Apply route three (3) times daily.  100 Each 11       Past History     Past Medical History:  Past Medical History:   Diagnosis Date    Asthma     BMI 50.0-59.9, adult ([facility] [address] 2016    Diabetes mellitus type II     Hypertension     Psychiatric disorder     anxiety/depression    Sleep apnea     cpap 5 YEARS       Past Surgical History:  Past Surgical History:   Procedure Laterality Date    HX APPENDECTOMY  2016    Kevin Rodriguez MD    HX  SECTION      HX CHOLECYSTECTOMY      HX ENDOSCOPY      HX LAP CHOLECYSTECTOMY  2022    lap shayan, Intraoperative cholangiogram with intraoperative interpretation       Family History:  Family History   Problem Relation Age of Onset    Cancer Mother         BREAST No Known Problems Father     Diabetes Maternal Grandfather     Diabetes Paternal Grandmother        Social History:  Social History     Tobacco Use    Smoking status: Never    Smokeless tobacco: Never   Vaping Use    Vaping Use: Former   Substance Use Topics    Alcohol use: No    Drug use: No       Allergies: Allergies   Allergen Reactions    Amoxicillin Angioedema    Celexa [Citalopram] Anxiety    Penicillins Angioedema     As an adult         Review of Systems   Review of Systems   Constitutional:  Negative for chills and fever. Respiratory:  Negative for cough and shortness of breath. Cardiovascular:  Negative for chest pain and leg swelling. Gastrointestinal:  Positive for abdominal pain, nausea and vomiting. Genitourinary: Negative. Musculoskeletal:  Negative for back pain and gait problem. Skin:  Negative for color change and rash. Neurological:  Negative for dizziness, weakness, light-headedness and headaches. All other systems reviewed and are negative. Physical Exam   Physical Exam  Vitals and nursing note reviewed. Constitutional:       General: She is in acute distress. Appearance: Normal appearance. She is not ill-appearing or toxic-appearing. Comments: Patient appears uncomfortable sitting upright in bed, rocking back and forth. Hyperventilating   HENT:      Head: Normocephalic and atraumatic. Nose: Nose normal.      Mouth/Throat:      Mouth: Mucous membranes are moist.   Eyes:      Extraocular Movements: Extraocular movements intact. Pupils: Pupils are equal, round, and reactive to light. Cardiovascular:      Rate and Rhythm: Normal rate and regular rhythm. Heart sounds: No murmur heard. Pulmonary:      Effort: Pulmonary effort is normal. No respiratory distress. Breath sounds: Normal breath sounds. No wheezing. Abdominal:      General: There is no distension. Palpations: Abdomen is soft. Tenderness:  There is abdominal tenderness in the epigastric area. There is no guarding or rebound. Musculoskeletal:         General: No swelling or tenderness. Normal range of motion. Cervical back: Normal range of motion and neck supple. Right lower leg: No edema. Left lower leg: No edema. Skin:     General: Skin is warm and dry. Coloration: Skin is not pale. Findings: No erythema. Neurological:      General: No focal deficit present. Mental Status: She is alert and oriented to person, place, and time. Diagnostic Study Results     Labs -     Recent Results (from the past 12 hour(s))   CBC WITH AUTOMATED DIFF    Collection Time: 09/18/22  4:21 PM   Result Value Ref Range    WBC 19.0 (H) 3.6 - 11.0 K/uL    RBC 4.03 3.80 - 5.20 M/uL    HGB 11.0 (L) 11.5 - 16.0 g/dL    HCT 33.6 (L) 35.0 - 47.0 %    MCV 83.4 80.0 - 99.0 FL    MCH 27.3 26.0 - 34.0 PG    MCHC 32.7 30.0 - 36.5 g/dL    RDW 14.3 11.5 - 14.5 %    PLATELET 185 (H) 248 - 400 K/uL    MPV 9.3 8.9 - 12.9 FL    NRBC 0.0 0  WBC    ABSOLUTE NRBC 0.00 0.00 - 0.01 K/uL    NEUTROPHILS 83 (H) 32 - 75 %    LYMPHOCYTES 13 12 - 49 %    MONOCYTES 3 (L) 5 - 13 %    EOSINOPHILS 0 0 - 7 %    BASOPHILS 0 0 - 1 %    IMMATURE GRANULOCYTES 1 (H) 0.0 - 0.5 %    ABS. NEUTROPHILS 15.8 (H) 1.8 - 8.0 K/UL    ABS. LYMPHOCYTES 2.5 0.8 - 3.5 K/UL    ABS. MONOCYTES 0.5 0.0 - 1.0 K/UL    ABS. EOSINOPHILS 0.0 0.0 - 0.4 K/UL    ABS. BASOPHILS 0.1 0.0 - 0.1 K/UL    ABS. IMM.  GRANS. 0.1 (H) 0.00 - 0.04 K/UL    DF AUTOMATED     URINALYSIS W/ REFLEX CULTURE    Collection Time: 09/18/22  4:21 PM    Specimen: Urine   Result Value Ref Range    Color YELLOW/STRAW      Appearance CLEAR CLEAR      Specific gravity 1.023      pH (UA) 5.5 5.0 - 8.0      Protein 300 (A) NEG mg/dL    Glucose >1,000 (A) NEG mg/dL    Ketone 40 (A) NEG mg/dL    Bilirubin Negative NEG      Blood MODERATE (A) NEG      Urobilinogen 1.0 0.2 - 1.0 EU/dL    Nitrites Negative NEG      Leukocyte Esterase Negative NEG      UA:UC IF INDICATED CULTURE NOT INDICATED BY UA RESULT CNI      WBC 0-4 0 - 4 /hpf    RBC 5-10 0 - 5 /hpf    Epithelial cells MANY (A) FEW /lpf    Bacteria Negative NEG /hpf    Hyaline cast 5-10 0 - 2 /lpf   LIPASE    Collection Time: 09/18/22  5:30 PM   Result Value Ref Range    Lipase 133 73 - 777 U/L   METABOLIC PANEL, COMPREHENSIVE    Collection Time: 09/18/22  5:30 PM   Result Value Ref Range    Sodium 135 (L) 136 - 145 mmol/L    Potassium 4.5 3.5 - 5.1 mmol/L    Chloride 103 97 - 108 mmol/L    CO2 22 21 - 32 mmol/L    Anion gap 10 5 - 15 mmol/L    Glucose 361 (H) 65 - 100 mg/dL    BUN 35 (H) 6 - 20 MG/DL    Creatinine 1.89 (H) 0.55 - 1.02 MG/DL    BUN/Creatinine ratio 19 12 - 20      GFR est AA 36 (L) >60 ml/min/1.73m2    GFR est non-AA 29 (L) >60 ml/min/1.73m2    Calcium 9.3 8.5 - 10.1 MG/DL    Bilirubin, total 0.5 0.2 - 1.0 MG/DL    ALT (SGPT) 28 12 - 78 U/L    AST (SGOT) 13 (L) 15 - 37 U/L    Alk. phosphatase 98 45 - 117 U/L    Protein, total 8.1 6.4 - 8.2 g/dL    Albumin 3.2 (L) 3.5 - 5.0 g/dL    Globulin 4.9 (H) 2.0 - 4.0 g/dL    A-G Ratio 0.7 (L) 1.1 - 2.2         Radiologic Studies -   CT ABD PELV WO CONT   Final Result   No acute abdominal or pelvic process identified        CT Results  (Last 48 hours)                 09/18/22 2050  CT ABD PELV WO CONT Final result    Impression:  No acute abdominal or pelvic process identified       Narrative:  EXAM: CT ABD PELV WO CONT       INDICATION: epigastric pain, intractible n/v, recent lap shayan       COMPARISON: 6.16.2022       IV CONTRAST: None. ORAL CONTRAST: 0       TECHNIQUE:    Thin axial images were obtained through the abdomen and pelvis. Coronal and   sagittal reformats were generated. CT dose reduction was achieved through use of   a standardized protocol tailored for this examination and automatic exposure   control for dose modulation.         The absence of intravenous contrast material reduces the sensitivity for   evaluation of the vasculature and solid organs. FINDINGS:    LOWER THORAX: No significant abnormality in the incidentally imaged lower chest.   LIVER: No mass. BILIARY TREE: Prior cholecystectomy CBD is not dilated. SPLEEN: within normal limits. PANCREAS: No focal abnormality. ADRENALS: Unremarkable. KIDNEYS/URETERS: No calculus or hydronephrosis. STOMACH: Unremarkable. SMALL BOWEL: No dilatation or wall thickening. COLON: No dilatation or wall thickening. APPENDIX: Not visualized   PERITONEUM: No ascites or pneumoperitoneum. RETROPERITONEUM: No lymphadenopathy or aortic aneurysm. REPRODUCTIVE ORGANS: Anteverted uterus   URINARY BLADDER: No mass or calculus. BONES: No destructive bone lesion. 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 ABDOMINAL WALL: No mass or hernia. ADDITIONAL COMMENTS: N/A                 CXR Results  (Last 48 hours)      None            Medical Decision Making   I am the first provider for this patient. I reviewed the vital signs, available nursing notes, past medical history, past surgical history, family history and social history. Vital Signs-Reviewed the patient's vital signs. Patient Vitals for the past 12 hrs:   Temp Pulse Resp BP SpO2   09/18/22 2106 -- 99 30 (!) 161/74 100 %   09/18/22 2015 98.1 °F (36.7 °C) 86 22 (!) 157/73 100 %   09/18/22 1933 98 °F (36.7 °C) (!) 102 20 (!) 160/65 100 %   09/18/22 1614 97.6 °F (36.4 °C) 90 22 (!) 183/107 100 %       Records Reviewed: Nursing Notes, Old Medical Records, Previous Radiology Studies, and Previous Laboratory Studies  I personally reviewed general surgery postoperative records from August 2022. Provider Notes (Medical Decision Making):   68-year-old female with above history including biliary dyskinesia status post lap cholecystectomy August 2022 here with persistent nausea, vomiting, epigastric abdominal pain.   She had a gastric emptying study and performed 2 days ago, unknown what the results are, this was performed at outside hospital.  She is afebrile and vital signs stable. Appears uncomfortable and is hyperventilating and rocking back and forth on bed due to persistent nausea and vomiting. I will obtain blood work, urinalysis, CT abdomen pelvis, treat symptomatically, and reassess. ED Course:   Initial assessment performed. The patients presenting problems have been discussed, and they are in agreement with the care plan formulated and outlined with them. I have encouraged them to ask questions as they arise throughout their visit. ED Course as of 09/19/22 0009   Mon Sep 19, 2022   0008 Patient has failed p.o. trial after receiving multiple antiemetic at medications. Still having persistent nausea vomiting and unable to tolerate p.o. intake. Given her dehydration, hyperglycemia, CLAUDIA, will discuss with hospitalist for admission. [facility]      ED Course User Index  [AK] Richmond Vázquez MD       Admission Note:  Patient is being admitted to the hospital by Dr. Fish Rosa, Service: Hospitalist.  The results of their tests and reasons for their admission have been discussed with them and available family. They convey agreement and understanding for the need to be admitted and for their admission diagnosis. Disposition:  Admit    Diagnosis     Clinical Impression:   1. Intractable nausea and vomiting    2. Abdominal pain, epigastric    3. CLAUDIA (acute kidney injury) ([address]    4. Hyperglycemia        Attestations:  I am the first and primary provider of record for this patient's ED encounter. I personally performed the services described above in this documentation. Margret Morrell MD    Please note that this dictation was completed with [employer], the computer voice recognition software. Quite often unanticipated grammatical, syntax, homophones, and other interpretive errors are inadvertently transcribed by the computer software. Please disregard these errors.   Please excuse any errors that have escaped final proofreading. Thank you.

## 2022-09-19 NOTE — CONSULTS
Gastroenterology Consultation Note  Dr. Kiesha Vaughn    NAME: Ej Lockhart : 1980 MRN: [id number]   PCP: Milton Prather MD  Date/Time:  2022 3:46 PM  Subjective:   REASON FOR CONSULT:      Ej Lockhart is a 38 y.o.  female who I was asked to see for refractory N/V. Patient has a h/o morbid obesity with BMI in range of 53 and h/o diabetes x 12 years who first was seen in the clinic on 8/3/22 by GIOVANA Fernández for severe N/V/Upper abdominal pain with normal CT scan. Was then admitted to [facility] 22-22 for N/V and had imaging showing possible GB polyp and had an EGD w Dr. Mariah Waters that showed mild erythema in stomach and duodenum with negative Rapid HP testing. Has been taking PPI daily with ongoing sxs. Has lost from 293 to 281 lbs since 8/3/22 office visit. Denies smoking cigarrettes, no EtOH, no Marijuana, no opiate use. Had an abnormal HIDA scan 8/15/19 w GB EF 19%. She then had an uneventful Lap Kimberly with negative IOC with Dr. Carlyle Turner on 22 (Path: normal GB). Her upper abdominal pain did improve some but then her nausea returned about 2 weeks from the surgery and was sent for a NM Gastric Emptying scan done 22 at [facility] (result: T 1/2 = 139 min)  Following the GES she tried to eat some food (chicken) and became severely nauseated and then started vomiting and this continued all through the night and next day (Saturday). Came to [facility] ER yesterday since she could not keep down any PO intake. Her glucose was 361. Lipase 133 ALP 98 AST 13 ALT 28 T bili 0.5. CT abd/pelvis wo contrast: 22:    FINDINGS:   LOWER THORAX: No significant abnormality in the incidentally imaged lower chest.  LIVER: No mass. BILIARY TREE: Prior cholecystectomy CBD is not dilated. SPLEEN: within normal limits. PANCREAS: No focal abnormality. ADRENALS: Unremarkable. KIDNEYS/URETERS: No calculus or hydronephrosis. STOMACH: Unremarkable.   SMALL BOWEL: No dilatation or wall thickening. COLON: No dilatation or wall thickening. APPENDIX: Not visualized  PERITONEUM: No ascites or pneumoperitoneum. RETROPERITONEUM: No lymphadenopathy or aortic aneurysm. REPRODUCTIVE ORGANS: Anteverted uterus  URINARY BLADDER: No mass or calculus. BONES: No destructive bone lesion. Bilateral L5 spondylolysis with grade 1  spondylotic. ABDOMINAL WALL: No mass or hernia. ADDITIONAL COMMENTS: N/A     IMPRESSION  No acute abdominal or pelvic process identifiedFINDINGS:   LOWER THORAX: No significant abnormality in the incidentally imaged lower chest.  LIVER: No mass. BILIARY TREE: Prior cholecystectomy CBD is not dilated. SPLEEN: within normal limits. PANCREAS: No focal abnormality. ADRENALS: Unremarkable. KIDNEYS/URETERS: No calculus or hydronephrosis. STOMACH: Unremarkable. SMALL BOWEL: No dilatation or wall thickening. COLON: No dilatation or wall thickening. APPENDIX: Not visualized  PERITONEUM: No ascites or pneumoperitoneum. RETROPERITONEUM: No lymphadenopathy or aortic aneurysm. REPRODUCTIVE ORGANS: Anteverted uterus  URINARY BLADDER: No mass or calculus. BONES: No destructive bone lesion. Bilateral L5 spondylolysis with grade 1  spondylotic. ABDOMINAL WALL: No mass or hernia. ADDITIONAL COMMENTS: N/A     IMPRESSION  No acute abdominal or pelvic process identified    NM GES:       Patient ate eggs containing 1.1 mCi Tc 99m sulfur colloid. Serial         images were obtained over the anterior abdomen and lower chest.         Gastric emptying half time 139 minutes. No reflux was observed. Impression: Delayed gastric emptying.                                  NORMAL T 1/2  FOR SOLIDS:           Men              46 min            Women        80 min          Past Medical History:   Diagnosis Date    Asthma     [facility] 50.0[facility]59.9[facility]    Diabetes mellitus type II     Hypertension     Psychiatric disorder     anxiety/depression Sleep apnea     cpap 5 YEARS      Past Surgical History:   Procedure Laterality Date    HX APPENDECTOMY  2016    Sherald Spurling Wood,MD    HX  SECTION      HX CHOLECYSTECTOMY      HX ENDOSCOPY      HX LAP CHOLECYSTECTOMY  2022    lap shayan, Intraoperative cholangiogram with intraoperative interpretation     Social History     Tobacco Use    Smoking status: Never    Smokeless tobacco: Never   Substance Use Topics    Alcohol use: No      Family History   Problem Relation Age of Onset    Cancer Mother         BREAST    No Known Problems Father     Diabetes Maternal Grandfather     Diabetes Paternal Grandmother       Allergies   Allergen Reactions    Amoxicillin Angioedema    Celexa [Citalopram] Anxiety    Penicillins Angioedema     As an adult      Home Medications:  Prior to Admission Medications   Prescriptions Last Dose Informant Patient Reported? Taking? Blood-Glucose Meter misc 2022  Yes Yes   Sig: by Does Not Apply route. Checks Blood sugar once a month   Dexcom G6  misc 2022  Yes Yes   Sig: See Admin Instructions. Insulin Needles, Disposable, (PAMELLA PEN NEEDLE) 32 gauge x 5/32\" ndle 2022  No Yes   Si times daily   Insulin Needles, Disposable, (Pamella Pen Needle) 32 gauge x 5/32\" ndle 2022  No Yes   Sig: For insulin up to 5 times daily   Insulin Syringe-Needle U-100 (BD INSULIN SYRINGE ULT-FINE II) 1 mL 31 x 5/16\" Syrg 2022  No Yes   Si Each by Does Not Apply route three (3) times daily. Lancets (ONE TOUCH DELICA) Misc 3/09/5751  No Yes   Sig: by Does Not Apply route three (3) times daily. Trintellix 10 mg tablet 2022  Yes Yes   Sig: Take 10 mg by mouth daily.    glucose blood VI test strips (OneTouch Verio test strips) strip 2022  No Yes   Sig: Test blood sugars 3 times daily   insulin glargine (Lantus Solostar U-100 Insulin) 100 unit/mL (3 mL) inpn 2022  No Yes   Sig: INJECT 100 UNITS SUBCUTANEOUSLY ROUTE DAILY   insulin lispro (HumaLOG KwikPen Insulin) 200 unit/mL (3 mL) inpn 9/18/2022  No Yes   Sig: INJECT 20-40 UNITS THREE TIMES A DAY BEFORE MEALS   losartan (COZAAR) 50 mg tablet 9/18/2022  No Yes   Sig: Take 1 Tablet by mouth two (2) times a day. pantoprazole (PROTONIX) 40 mg tablet 9/18/2022  Yes Yes   Sig: Take 40 mg by mouth two (2) times a day.       Facility-Administered Medications: None     Hospital medications:  Current Facility-Administered Medications   Medication Dose Route Frequency    acetaminophen (TYLENOL) tablet 650 mg  650 mg Oral Q6H PRN    sodium chloride (NS) flush 5-40 mL  5-40 mL IntraVENous Q8H    sodium chloride (NS) flush 5-40 mL  5-40 mL IntraVENous PRN    acetaminophen (TYLENOL) tablet 650 mg  650 mg Oral Q6H PRN    Or    acetaminophen (TYLENOL) suppository 650 mg  650 mg Rectal Q6H PRN    polyethylene glycol (MIRALAX) packet 17 g  17 g Oral DAILY PRN    ondansetron (ZOFRAN ODT) tablet 4 mg  4 mg Oral Q8H PRN    Or    ondansetron (ZOFRAN) injection 4 mg  4 mg IntraVENous Q6H PRN    enoxaparin (LOVENOX) injection 40 mg  40 mg SubCUTAneous DAILY    pantoprazole (PROTONIX) tablet 40 mg  40 mg Oral BID    vortioxetine (TRINTELLIX) tablet 10 mg  10 mg Oral DAILY    insulin glargine (LANTUS) injection 60 Units  60 Units SubCUTAneous QHS    insulin lispro (HUMALOG) injection   SubCUTAneous Q4H    0.9% sodium chloride infusion  100 mL/hr IntraVENous CONTINUOUS    insulin lispro (HUMALOG) injection   SubCUTAneous AC&HS    glucose chewable tablet 16 g  4 Tablet Oral PRN    glucagon (GLUCAGEN) injection 1 mg  1 mg IntraMUSCular PRN    dextrose 10% infusion 0-250 mL  0-250 mL IntraVENous PRN    metoclopramide HCl (REGLAN) injection 10 mg  10 mg IntraVENous Q6H     REVIEW OF SYSTEMS:      Review of Systems -   History obtained from chart review and the patient  General ROS: positive for  - fatigue and weight loss  Psychological ROS: negative for - depression  Hematological and Lymphatic ROS: negative for - jaundice  Respiratory ROS: no cough, shortness of breath, or wheezing  Cardiovascular ROS: no chest pain or dyspnea on exertion  Gastrointestinal ROS: see HPI  Genito-Urinary ROS: no dysuria, trouble voiding, or hematuria  Musculoskeletal ROS: negative  Neurological ROS: no TIA or stroke symptoms  Dermatological ROS: negative    Objective:   VITALS:    Visit Vitals  BP (!) 148/66 (BP 1 Location: Left arm, BP Patient Position: At rest)   Pulse (!) 103   Temp 98.2 °F (36.8 °C)   Resp 18   Ht 5' 2\" (1.575 m)   Wt 127.7 kg (281 lb 8.4 oz)   LMP 2022   SpO2 99%   BMI 51.49 kg/m²     Temp (24hrs), Av.2 °F (36.8 °C), Min:97.6 °F (36.4 °C), Max:98.7 °F (37.1 °C)    PHYSICAL EXAM:   General:    Morbidly obese, pleasant WF who is Alert, cooperative, no distress, appears stated age. Head:   Normocephalic, without obvious abnormality, atraumatic. Eyes:   Conjunctivae clear, anicteric sclerae. Pupils are equal  Nose:  Nares normal. No drainage or sinus tenderness. Throat:    Lips, mucosa, and tongue normal.  No Thrush  Neck:  Supple, symmetrical,  no adenopathy, thyroid: non tender  Back:    Symmetric,  No CVA tenderness. Lungs:   CTA bilaterally. No wheezing/rhonchi/rales. Heart:   Regular rate and rhythm,  no murmur, rub or gallop. Abdomen:   Soft, + minimal tenderness in epigastrim, ND + BS  Rectal:  deferred  Extremities: No cyanosis. No edema. No clubbing  Skin:     Texture, turgor normal. No rashes/lesions/jaundice  Lymph: Cervical, supraclavicular normal.  Psych:  Good insight. Not depressed. Not anxious or agitated. Neurologic:      No facial asymmetry. No aphasia or slurred speech normal strength, A/O X 3.      LAB DATA REVIEWED:    Lab Results   Component Value Date/Time    WBC 18.9 (H) 2022 05:54 AM    HGB 10.3 (L) 2022 05:54 AM    HCT 32.5 (L) 2022 05:54 AM    PLATELET 084  05:54 AM    MCV 85.3 2022 05:54 AM     Lab Results   Component Value Date/Time    ALT (SGPT) 28 2022 05:30 PM    Alk. phosphatase 98 09/18/2022 05:30 PM    Bilirubin, total 0.5 09/18/2022 05:30 PM     Lab Results   Component Value Date/Time    Sodium 136 09/19/2022 05:54 AM    Potassium 4.1 09/19/2022 05:54 AM    Chloride 103 09/19/2022 05:54 AM    CO2 27 09/19/2022 05:54 AM    Anion gap 6 09/19/2022 05:54 AM    Glucose 328 (H) 09/19/2022 05:54 AM    BUN 39 (H) 09/19/2022 05:54 AM    Creatinine 2.31 (H) 09/19/2022 05:54 AM    BUN/Creatinine ratio 17 09/19/2022 05:54 AM    GFR est AA 28 (L) 09/19/2022 05:54 AM    GFR est non-AA 23 (L) 09/19/2022 05:54 AM    Calcium 8.9 09/19/2022 05:54 AM     Lab Results   Component Value Date/Time    Lipase 133 09/18/2022 05:30 PM         Impression:    Intractable nausea and vomiting    Diabetic Gastroparesis    Morbid Obesity    Poorly controlled DM    S/P Recent Lap Shayan w neg IOC    Dehydration    Acute renal failure    Plan:  Patient did not have much relief in her N/V sxs post Lap shayan and her pathology showed a normal GB without mention of polyps. Given that her sxs persist and has not followed up with endocrinology (Dr. Ismael Cordova) in some time will need their help with improving glycemic control to help decrease frequency of her N/V flares of her gastroparesis.   -will consult nutrtion to help w diet education (already discussed this with her and family present)  -tolerating reglan thus far and hope to continue after discharge for a limited time until her diet improves  -avoid constipation  -will continue PPI (recent EGD 8/5/22 at [facility])  -antiemetics  -IVF, will see in am       ___________________________________________________  Care Plan discussed with:    [x]    Patient   [x]    Family   []    Nursing   []    Attending     ___________________________________________________  GI: Carl Barajas MD

## 2022-09-19 NOTE — ED NOTES
TRANSITION OF CARE - SBAR OUT    Patient is being transferred to [facility] 1 Medical Oncology, Room# 113. Report GIVEN TO Mona Coffey RN on Mayo Mirza for routine progression of care. Report is consisted of the following information SBAR, ED Summary, and MAR. Patient transferred to receiving unit by:    (RN or Tech Name)     Called outstanding consults: No  no   Collected routine labs: Yes yes       All current orders reviewed with accepting nurse: Yes    The following personal items will be sent with the patient during transfer to the floor: All valuables:                          CARDIAC MONITORING ORDERED: Yes       The following CURRENT information were reported to the receiving RN:    CODE STATUS: Full Code    NIH SCORE:    EL SCREENING:      NEURO ASSESSMENT:        RESTRAINTS IN USE: No      IS DOCUMENTATION COMPLETE: Yes      Vital Signs  Level of Consciousness: Alert (0) (09/19/22 0636)  Temp: 98.4 °F (36.9 °C) (09/19/22 0636)  Temp Source: Oral (09/19/22 0636)  Pulse (Heart Rate): (!) 103 (09/19/22 0636)  Cardiac Rhythm: Sinus Tachy (09/18/22 2000)  Resp Rate: 17 (09/19/22 0636)  BP: (!) 155/61 (09/19/22 0636)  MAP (Monitor): 104 (09/19/22 0036)  MAP (Calculated): 92 (09/19/22 0636)  BP 1 Location: Left upper arm (09/19/22 0636)  BP 1 Method: Automatic (09/18/22 2106)  MEWS Score: 2 (09/19/22 0636)  Pain 1  Pain Scale 1: Numeric (0 - 10) (09/18/22 1933)  Pain Intensity 1: 10 (09/18/22 1933)      OXYGEN: Oxygen Therapy  O2 Device: None (Room air) (09/19/22 0036)    KINDER FALL ASSESSMENT:  Presents to emergency department  because of falls (Syncope, seizure, or loss of consciousness): No, Age > 70: No, Altered Mental Status, Intoxication with alcohol or substance confusion (Disorientation, impaired judgment, poor safety awaremess, or inability to follow instructions): No, Impaired Mobility: Ambulates or transfers with assistive devices or assistance;  Unable to ambulate or transer.: No, Nursing Judgement : No    WOUNDS: No  2    URINARY CATHETER: voiding    LINE ACCESS:   Peripheral IV 09/18/22 Right Antecubital (Active)        Opportunity for questions and clarification were provided.   Suzette Parrish RN 2

## 2022-09-19 NOTE — PROGRESS NOTES
TRANSFER - IN REPORT:    Verbal report received from ANDRE Avila on [employer]  being received from ED Rm # [id number] for routine progression of care      Report consisted of patients Situation, Background, Assessment and   Recommendations(SBAR). Information from the following report(s) SBAR, Kardex, and MAR was reviewed with the receiving nurse. Opportunity for questions and clarification was provided. Assessment completed upon patients arrival to unit and care assumed.

## 2022-09-19 NOTE — ED NOTES
Pt sleeping at this time, remains on the cardiac monitor. Bed low and locked and call light is within reach.

## 2022-09-19 NOTE — PROGRESS NOTES
Pt is currently in obs status and received appropriate observation document. Pt is currently being followed by specialist.  [id number] Duong will continue to follow and enter referrals as deemed necessary. CM will continue to follow.     NEELAM Almonte, [address] [place] [address]

## 2022-09-19 NOTE — PROGRESS NOTES
End of Shift Note    Bedside shift change report given to Francisco Manriquez RN (oncoming nurse) by Chino Viera RN (offgoing nurse). Report included the following information SBAR, Kardex, and MAR    Shift worked:  7a - 7p     Shift summary and any significant changes:     Pt admitted to Obs Rm # [id number] from ED     Started on NS @ 100     Pt seen by GI     No episodes of vomiting today. Pt tolerated clear liquid diet well     Concerns for physician to address:       Zone phone for oncoming shift:   [phone]       Activity:  Activity Level: Up ad alexx  Number times ambulated in hallways past shift: 0  Number of times OOB to chair past shift: 1    Cardiac:   Cardiac Monitoring: Yes      Cardiac Rhythm: Sinus Rhythm    Access:  Current line(s): PIV     Genitourinary:   Urinary status: voiding    Respiratory:   O2 Device: Nasal cannula  Chronic home O2 use?: YES  Incentive spirometer at bedside: NO       GI:  Last Bowel Movement Date: 09/16/22  Current diet:  No diet orders on file  Passing flatus: YES  Tolerating current diet: NO       Pain Management:   Patient states pain is manageable on current regimen: YES    Skin:  Lemuel Score: 21  Interventions: speciality bed, float heels, increase time out of bed, and nutritional support     Patient Safety:  Fall Score:  Total Score: 1  Interventions: gripper socks, pt to call before getting OOB, and stay with me (per policy)       Length of Stay:  Expected LOS: - - -  Actual LOS: 0      Chino Viera RN

## 2022-09-19 NOTE — H&P
This note is compiled to communicate with the medical care team. It may contain sensitive and protected information. It is not intended to serve as a source of communication with patients/families/friends; that communication occurs at the bedside or via alternative direct communications means (secure messaging, letters, video/telephone, etc.). Hospitalist Admission Note    NAME: Triston Lopez   :  1980   MRN:  [id number]     Date/Time:  2022 4:36 AM    Patient PCP: Barbette Bence, MD  ______________________________________________________________________  Given the patient's current clinical presentation, I have a high level of concern for decompensation if discharged from the emergency department. Complex decision making was performed, which includes reviewing the patient's available past medical records, laboratory results, and x-ray films. My assessment of this patient's clinical condition and my plan of care is as follows. Subjective:   CHIEF COMPLAINT: Intractable nausea and vomiting and abdominal pain    HISTORY OF PRESENT ILLNESS:     Triston Lopez is a 38 y.o.  female with pertinent past medical history of obesity class III (BMI 51.49), insulin-dependent type 2 diabetes with very high insulin demand (greater than 100 units/day), essential hypertension, asthma, MDD/ALON, biliary dyskinesia status post lap shayan 2022, DAVID on CPAP who presents with complaints of nausea, vomiting, and epigastric abdominal pain for the past 2 days. Patient reports symptoms of flared since undergoing gastric emptying study at [facility] this past Friday (). Unfortunately, she has not yet received results from study, but denies prior history of gastroparesis. She reports pain is similar to that she experienced with biliary dyskinesia. She notes pain is precipitated by p.o. intake and minimally relieved with vomiting.   She denies any other associated symptoms including chest pain/palpitations, fever/chills, SOB/WALLIS. She is followed by [facility] in outpatient setting. She reports her last A1c was greater than 10 performed by her PCP. She is established with a endocrinologist, Dr. Anna Dawn, reports it has been quite a while since she was seen. She does note she thinks her blood sugars have been doing better lately since she has been using a Dexcom monitor estimating that her average blood sugar is around 200-250. In the ED, patient afebrile and hemodynamically stable saturating upper 90s on room air. Noted to have multiple episodes of retching and emesis and was administered Zofran, droperidol, and metoclopramide. Of note, metoclopramide is reported to have ultimately provided relief. Unfortunately, patient was unable to tolerate p.o. intake prompting request for admission. CT abdomen pelvis performed and demonstrated no acute abdominal or pelvic process. Labs demonstrate: WBC 19.0, hemoglobin 11.0, platelets 728, UA not reflexed to culture (protein, glucose, ketones, moderate blood, many epithelial cells). Sodium 135, potassium 4.5, glucose 361, BUN 35, creatinine 1.89 (baseline 1.6-1.7), lipase 133. We were asked to admit for work up and evaluation of the above problems.      Past Medical History:   Diagnosis Date    Asthma     BMI 50.0-59.9, adult ([facility] [address] 2016    Diabetes mellitus type II     Hypertension     Psychiatric disorder     anxiety/depression    Sleep apnea     cpap 5 YEARS        Past Surgical History:   Procedure Laterality Date    HX APPENDECTOMY  2016    Maxwell Rodriguez MD    HX  SECTION      HX CHOLECYSTECTOMY      HX ENDOSCOPY      HX LAP CHOLECYSTECTOMY  2022    lap shayan, Intraoperative cholangiogram with intraoperative interpretation       Social History     Tobacco Use    Smoking status: Never    Smokeless tobacco: Never   Substance Use Topics    Alcohol use: No        Family History   Problem Relation Age of Onset    Cancer Mother         BREAST    No Known Problems Father     Diabetes Maternal Grandfather     Diabetes Paternal Grandmother        Allergies   Allergen Reactions    Amoxicillin Angioedema    Celexa [Citalopram] Anxiety    Penicillins Angioedema     As an adult        Prior to Admission medications    Medication Sig Start Date End Date Taking? Authorizing Provider   Dexcom G6  misc See Javad Puri. 8/19/22  Yes Provider, Historical   Trintellix 10 mg tablet Take 10 mg by mouth daily. 7/5/22  Yes Provider, Historical   pantoprazole (PROTONIX) 40 mg tablet Take 40 mg by mouth two (2) times a day. Yes Provider, Historical   glucose blood VI test strips (OneTouch Verio test strips) strip Test blood sugars 3 times daily 5/4/22  Yes Kavitha Johnson MD   insulin glargine (Lantus Solostar U-100 Insulin) 100 unit/mL (3 mL) inpn INJECT 100 UNITS SUBCUTANEOUSLY ROUTE DAILY 9/20/21  Yes Kavitha Johnson MD   insulin lispro (HumaLOG KwikPen Insulin) 200 unit/mL (3 mL) inpn INJECT 20-40 UNITS THREE TIMES A DAY BEFORE MEALS 7/1/21  Yes Kavitha Johnson MD   losartan (COZAAR) 50 mg tablet Take 1 Tablet by mouth two (2) times a day. 6/6/21  Yes Kavitha Johnson MD   Insulin Needles, Disposable, (Trina Pen Needle) 32 gauge x 5/32\" ndle For insulin up to 5 times daily 3/18/20  Yes Kavitha Johnson MD   Blood-Glucose Meter misc by Does Not Apply route. Checks Blood sugar once a month   Yes Provider, Historical   Insulin Needles, Disposable, (TRINA PEN NEEDLE) 32 gauge x 5/32\" ndle 4 times daily 1/17/17  Yes Shani Blackwell MD   Insulin Syringe-Needle U-100 (BD INSULIN SYRINGE ULT-FINE II) 1 mL 31 x 5/16\" Syrg 1 Each by Does Not Apply route three (3) times daily. 10/14/13  Yes Shani Blackwell MD   Lancets ([facility] [place] Does Not Apply route three (3) times daily.  10/31/12  Yes Shani Blackwell MD       REVIEW OF SYSTEMS:       Total of 12 systems reviewed as follows:       POSITIVE= Red text General: Fever, chills, sweats, weakness/malaise, weight loss/gain, loss of appetite    Eyes:   Vision change, eye pain   ENT:    Rhinorrhea, pharyngitis, Hearing loss    Respiratory:   cough, sputum production, SOB, WALLIS, wheezing, pleuritic pain    Cardiology:   chest pain, palpitations, orthopnea, edema, syncope    Gastrointestinal:  abdominal pain , N/V, diarrhea, dysphagia, constipation, bleeding    Genitourinary:  frequency, urgency, dysuria, hematuria, incontinence    Muskuloskeletal:  arthralgia, myalgia, back pain   Hematology:  easy bruising, nose or gum bleeding, lymphadenopathy    Dermatological: rash, ulceration, pruritis, color change / jaundice   Endocrine:  hot flashes or polydipsia    Neurological:  headache, dizziness, confusion, focal weakness, paresthesia, Speech difficulties, memory loss, gait difficulty   Psychological: Feelings of anxiety, depression, agitation       Objective:   VITALS:    Visit Vitals  BP (!) 159/78   Pulse (!) 101   Temp 98.1 °F (36.7 °C)   Resp 26   Ht 5' 2\" (1.575 m)   Wt 127.7 kg (281 lb 8.4 oz)   SpO2 98%   BMI 51.49 kg/m²       PHYSICAL EXAM:    General:   Alert, cooperative, no distress, morbidly obese otherwise well-appearing middle-aged  female        HEENT:  Atraumatic, anicteric sclerae, pink conjunctivae, mucosa moist, dentition fair     Neck:  Supple, symmetrical, trachea midline, no abnormalities on palpation     Lungs:   CTAB. Symmetric expansion. Good aeration. Normal respiratory effort. Chest wall:  No tenderness. No Accessory muscle use. Cardiovascular:   RRR, No murmur/rub/gallop. No JVD. Radial/AT/DP pulse 2+     GI/:   Obese/protuberant, soft, mildly tender to palpation in epigastric region. not distended. Bowel sounds normal. No HSM     Extremities No edema. No cyanosis. Capillary refill normal     Skin: No significant lesions noted. Not Jaundiced. No rashes      Neurologic: PERRL. EOMI. No facial asymmetry.  No aphasia or slurred speech. Moves all extremities. Sensation to light touch grossly intact BUE/BLE. No overt focal deficits appreciated     Psych:  Alert and oriented X 4. Normal affect. Good insight     Other:   N/A         LAB DATA REVIEWED:    Recent Results (from the past 24 hour(s))   CBC WITH AUTOMATED DIFF    Collection Time: 09/18/22  4:21 PM   Result Value Ref Range    WBC 19.0 (H) 3.6 - 11.0 K/uL    RBC 4.03 3.80 - 5.20 M/uL    HGB 11.0 (L) 11.5 - 16.0 g/dL    HCT 33.6 (L) 35.0 - 47.0 %    MCV 83.4 80.0 - 99.0 FL    MCH 27.3 26.0 - 34.0 PG    MCHC 32.7 30.0 - 36.5 g/dL    RDW 14.3 11.5 - 14.5 %    PLATELET 347 (H) 306 - 400 K/uL    MPV 9.3 8.9 - 12.9 FL    NRBC 0.0 0  WBC    ABSOLUTE NRBC 0.00 0.00 - 0.01 K/uL    NEUTROPHILS 83 (H) 32 - 75 %    LYMPHOCYTES 13 12 - 49 %    MONOCYTES 3 (L) 5 - 13 %    EOSINOPHILS 0 0 - 7 %    BASOPHILS 0 0 - 1 %    IMMATURE GRANULOCYTES 1 (H) 0.0 - 0.5 %    ABS. NEUTROPHILS 15.8 (H) 1.8 - 8.0 K/UL    ABS. LYMPHOCYTES 2.5 0.8 - 3.5 K/UL    ABS. MONOCYTES 0.5 0.0 - 1.0 K/UL    ABS. EOSINOPHILS 0.0 0.0 - 0.4 K/UL    ABS. BASOPHILS 0.1 0.0 - 0.1 K/UL    ABS. IMM.  GRANS. 0.1 (H) 0.00 - 0.04 K/UL    DF AUTOMATED     URINALYSIS W/ REFLEX CULTURE    Collection Time: 09/18/22  4:21 PM    Specimen: Urine   Result Value Ref Range    Color YELLOW/STRAW      Appearance CLEAR CLEAR      Specific gravity 1.023      pH (UA) 5.5 5.0 - 8.0      Protein 300 (A) NEG mg/dL    Glucose >1,000 (A) NEG mg/dL    Ketone 40 (A) NEG mg/dL    Bilirubin Negative NEG      Blood MODERATE (A) NEG      Urobilinogen 1.0 0.2 - 1.0 EU/dL    Nitrites Negative NEG      Leukocyte Esterase Negative NEG      UA:UC IF INDICATED CULTURE NOT INDICATED BY UA RESULT CNI      WBC 0-4 0 - 4 /hpf    RBC 5-10 0 - 5 /hpf    Epithelial cells MANY (A) FEW /lpf    Bacteria Negative NEG /hpf    Hyaline cast 5-10 0 - 2 /lpf   LIPASE    Collection Time: 09/18/22  5:30 PM   Result Value Ref Range    Lipase 133 73 - 111 U/L   METABOLIC PANEL, COMPREHENSIVE    Collection Time: 09/18/22  5:30 PM   Result Value Ref Range    Sodium 135 (L) 136 - 145 mmol/L    Potassium 4.5 3.5 - 5.1 mmol/L    Chloride 103 97 - 108 mmol/L    CO2 22 21 - 32 mmol/L    Anion gap 10 5 - 15 mmol/L    Glucose 361 (H) 65 - 100 mg/dL    BUN 35 (H) 6 - 20 MG/DL    Creatinine 1.89 (H) 0.55 - 1.02 MG/DL    BUN/Creatinine ratio 19 12 - 20      GFR est AA 36 (L) >60 ml/min/1.73m2    GFR est non-AA 29 (L) >60 ml/min/1.73m2    Calcium 9.3 8.5 - 10.1 MG/DL    Bilirubin, total 0.5 0.2 - 1.0 MG/DL    ALT (SGPT) 28 12 - 78 U/L    AST (SGOT) 13 (L) 15 - 37 U/L    Alk.  phosphatase 98 45 - 117 U/L    Protein, total 8.1 6.4 - 8.2 g/dL    Albumin 3.2 (L) 3.5 - 5.0 g/dL    Globulin 4.9 (H) 2.0 - 4.0 g/dL    A-G Ratio 0.7 (L) 1.1 - 2.2         IMAGING:  CT abdomen pelvis:  No acute abdominal or pelvic process identified    EKG: Ordered  ______________________________________________________________________    Assessment / Plan:  Intractable nausea and vomiting-suspected gastroparesis  Poorly controlled insulin-dependent diabetes mellitus  Obesity class III-BMI 51.49, essential hypertension, MDD/ALON, biliary dyskinesia status post lap shayan 8/2022  Leukocytosis-reactive suspected  DAVID on CPAP  MDD/ALON    PLAN:    Intractable nausea and vomiting-suspected gastroparesis  Poorly controlled insulin-dependent diabetes mellitus  Obesity class III-BMI 51.49, essential hypertension, MDD/ALON, biliary dyskinesia status post lap shayan 8/2022  Admit to telemetry monitoring  Will start at 1u/kg with 50/50 split:  -60u glargine  -20u TID AC  High dose sliding scale q4h until well controlled to better estimate daily insulin requirements  Endocrinology consulted, greatly appreciate their expertise  -Given high demand may require U5 100 insulin  Counseled on weight loss therapies  -May be a good candidate for SGLT2  -Unfortunately given gastroparesis GLP-1 is likely not an option  -Given young age and comorbidities would be a good candidate for bariatric surgery, but this should be approached by a physician who has an established relationship and rapport with the patient  Counseled on pathophysiology of gastroparesis  Discussed risk-benefit including risk of tardive dyskinesia of metoclopramide, which patient would like to trial -start low-dose 5 mg AC at bedtime  Obtain ECG to ensure no QT prolongation  Diabetes education consulted    Leukocytosis-reactive suspected  No other evidence of infection, will continue to monitor  Trend CBC    DAVID on CPAP  Nocturnal CPAP ordered    MDD/ALON  Continue PTA Trintellix      Code Status: Full    DVT Prophylaxis: Lovenox  GI Prophylaxis: Protonix  Diet: Diabetic       Care Plan discussed with:    Comments   Patient x    Family      RN     Care Manager                    Consultant:      _______________________________________________________________________  Baseline Level of Function: Independent    Expected  Disposition:   Home with Family x   HH/PT/OT/RN    SNF/LTC    GABY    ________________________________________________________________________  TOTAL TIME:  72 Minutes      Comments    x Reviewed previous records   >50% of visit spent in counseling and coordination of care x Discussion with patient and/or family and questions answered       ________________________________________________________________________  Signed: Fabienne Taylor DO  9/19/2022 4:36 AM    Please note that this documentation was completed in part with Dragon dictation software. Occasionally unanticipated grammatical, syntax, homophones, and other interpretive errors are inadvertently transcribed by the computer software. Please excuse and disregard any errors that have escaped final proofreading. If in doubt, please do not hesitate to reach out to myself or the assigned hospitalist via [facility] paging system for clarification.

## 2022-09-20 ENCOUNTER — APPOINTMENT (OUTPATIENT)
Dept: GENERAL RADIOLOGY | Age: 42
DRG: 074 | End: 2022-09-20
Attending: SPECIALIST
Payer: COMMERCIAL

## 2022-09-20 LAB
AMPHET UR QL SCN: NEGATIVE
ANION GAP SERPL CALC-SCNC: 7 MMOL/L (ref 5–15)
BARBITURATES UR QL SCN: NEGATIVE
BASOPHILS # BLD: 0 K/UL (ref 0–0.1)
BASOPHILS NFR BLD: 0 % (ref 0–1)
BENZODIAZ UR QL: NEGATIVE
BUN SERPL-MCNC: 29 MG/DL (ref 6–20)
BUN/CREAT SERPL: 18 (ref 12–20)
CALCIUM SERPL-MCNC: 8.5 MG/DL (ref 8.5–10.1)
CANNABINOIDS UR QL SCN: NEGATIVE
CHLORIDE SERPL-SCNC: 112 MMOL/L (ref 97–108)
CO2 SERPL-SCNC: 21 MMOL/L (ref 21–32)
COCAINE UR QL SCN: NEGATIVE
CREAT SERPL-MCNC: 1.61 MG/DL (ref 0.55–1.02)
DIFFERENTIAL METHOD BLD: ABNORMAL
DRUG SCRN COMMENT,DRGCM: NORMAL
EOSINOPHIL # BLD: 0 K/UL (ref 0–0.4)
EOSINOPHIL NFR BLD: 0 % (ref 0–7)
ERYTHROCYTE [DISTWIDTH] IN BLOOD BY AUTOMATED COUNT: 14 % (ref 11.5–14.5)
GLUCOSE BLD STRIP.AUTO-MCNC: 130 MG/DL (ref 65–117)
GLUCOSE BLD STRIP.AUTO-MCNC: 224 MG/DL (ref 65–117)
GLUCOSE BLD STRIP.AUTO-MCNC: 234 MG/DL (ref 65–117)
GLUCOSE BLD STRIP.AUTO-MCNC: 245 MG/DL (ref 65–117)
GLUCOSE SERPL-MCNC: 84 MG/DL (ref 65–100)
HCT VFR BLD AUTO: 31 % (ref 35–47)
HGB BLD-MCNC: 10 G/DL (ref 11.5–16)
IMM GRANULOCYTES # BLD AUTO: 0.3 K/UL (ref 0–0.04)
IMM GRANULOCYTES NFR BLD AUTO: 2 % (ref 0–0.5)
LYMPHOCYTES # BLD: 1.5 K/UL (ref 0.8–3.5)
LYMPHOCYTES NFR BLD: 9 % (ref 12–49)
MCH RBC QN AUTO: 27.1 PG (ref 26–34)
MCHC RBC AUTO-ENTMCNC: 32.3 G/DL (ref 30–36.5)
MCV RBC AUTO: 84 FL (ref 80–99)
METHADONE UR QL: NEGATIVE
MONOCYTES # BLD: 0.4 K/UL (ref 0–1)
MONOCYTES NFR BLD: 3 % (ref 5–13)
NEUTS SEG # BLD: 14.2 K/UL (ref 1.8–8)
NEUTS SEG NFR BLD: 86 % (ref 32–75)
NRBC # BLD: 0 K/UL (ref 0–0.01)
NRBC BLD-RTO: 0 PER 100 WBC
OPIATES UR QL: NEGATIVE
PCP UR QL: NEGATIVE
PLATELET # BLD AUTO: 321 K/UL (ref 150–400)
PMV BLD AUTO: 9.1 FL (ref 8.9–12.9)
POTASSIUM SERPL-SCNC: 3.9 MMOL/L (ref 3.5–5.1)
RBC # BLD AUTO: 3.69 M/UL (ref 3.8–5.2)
SERVICE CMNT-IMP: ABNORMAL
SODIUM SERPL-SCNC: 140 MMOL/L (ref 136–145)
WBC # BLD AUTO: 16.5 K/UL (ref 3.6–11)

## 2022-09-20 PROCEDURE — 94760 N-INVAS EAR/PLS OXIMETRY 1: CPT

## 2022-09-20 PROCEDURE — 74018 RADEX ABDOMEN 1 VIEW: CPT

## 2022-09-20 PROCEDURE — 74011250637 HC RX REV CODE- 250/637: Performed by: HOSPITALIST

## 2022-09-20 PROCEDURE — 65270000029 HC RM PRIVATE

## 2022-09-20 PROCEDURE — 74011636637 HC RX REV CODE- 636/637: Performed by: STUDENT IN AN ORGANIZED HEALTH CARE EDUCATION/TRAINING PROGRAM

## 2022-09-20 PROCEDURE — 74011250637 HC RX REV CODE- 250/637: Performed by: EMERGENCY MEDICINE

## 2022-09-20 PROCEDURE — G0378 HOSPITAL OBSERVATION PER HR: HCPCS

## 2022-09-20 PROCEDURE — 77010033678 HC OXYGEN DAILY

## 2022-09-20 PROCEDURE — 80048 BASIC METABOLIC PNL TOTAL CA: CPT

## 2022-09-20 PROCEDURE — 80307 DRUG TEST PRSMV CHEM ANLYZR: CPT

## 2022-09-20 PROCEDURE — 74011250636 HC RX REV CODE- 250/636: Performed by: FAMILY MEDICINE

## 2022-09-20 PROCEDURE — 74011636637 HC RX REV CODE- 636/637: Performed by: FAMILY MEDICINE

## 2022-09-20 PROCEDURE — 74011000250 HC RX REV CODE- 250: Performed by: STUDENT IN AN ORGANIZED HEALTH CARE EDUCATION/TRAINING PROGRAM

## 2022-09-20 PROCEDURE — 36415 COLL VENOUS BLD VENIPUNCTURE: CPT

## 2022-09-20 PROCEDURE — 85025 COMPLETE CBC W/AUTO DIFF WBC: CPT

## 2022-09-20 PROCEDURE — 74011250636 HC RX REV CODE- 250/636: Performed by: HOSPITALIST

## 2022-09-20 PROCEDURE — 82962 GLUCOSE BLOOD TEST: CPT

## 2022-09-20 PROCEDURE — 74011250637 HC RX REV CODE- 250/637: Performed by: STUDENT IN AN ORGANIZED HEALTH CARE EDUCATION/TRAINING PROGRAM

## 2022-09-20 PROCEDURE — 74011250636 HC RX REV CODE- 250/636: Performed by: STUDENT IN AN ORGANIZED HEALTH CARE EDUCATION/TRAINING PROGRAM

## 2022-09-20 PROCEDURE — 74011250636 HC RX REV CODE- 250/636: Performed by: SPECIALIST

## 2022-09-20 RX ORDER — MORPHINE SULFATE 2 MG/ML
1 INJECTION, SOLUTION INTRAMUSCULAR; INTRAVENOUS ONCE
Status: COMPLETED | OUTPATIENT
Start: 2022-09-20 | End: 2022-09-20

## 2022-09-20 RX ORDER — PROMETHAZINE HYDROCHLORIDE 25 MG/1
12.5 SUPPOSITORY RECTAL ONCE
Status: COMPLETED | OUTPATIENT
Start: 2022-09-20 | End: 2022-09-20

## 2022-09-20 RX ORDER — ONDANSETRON 4 MG/1
8 TABLET, ORALLY DISINTEGRATING ORAL
Status: DISCONTINUED | OUTPATIENT
Start: 2022-09-20 | End: 2022-09-23 | Stop reason: HOSPADM

## 2022-09-20 RX ORDER — ENOXAPARIN SODIUM 100 MG/ML
30 INJECTION SUBCUTANEOUS 2 TIMES DAILY
Status: DISCONTINUED | OUTPATIENT
Start: 2022-09-20 | End: 2022-09-23 | Stop reason: HOSPADM

## 2022-09-20 RX ORDER — ONDANSETRON 2 MG/ML
8 INJECTION INTRAMUSCULAR; INTRAVENOUS
Status: DISCONTINUED | OUTPATIENT
Start: 2022-09-20 | End: 2022-09-23 | Stop reason: HOSPADM

## 2022-09-20 RX ADMIN — SODIUM CHLORIDE 100 ML/HR: 9 INJECTION, SOLUTION INTRAVENOUS at 22:41

## 2022-09-20 RX ADMIN — INSULIN GLARGINE 60 UNITS: 100 INJECTION, SOLUTION SUBCUTANEOUS at 22:38

## 2022-09-20 RX ADMIN — SODIUM CHLORIDE, PRESERVATIVE FREE 10 ML: 5 INJECTION INTRAVENOUS at 22:39

## 2022-09-20 RX ADMIN — SODIUM CHLORIDE 100 ML/HR: 9 INJECTION, SOLUTION INTRAVENOUS at 00:46

## 2022-09-20 RX ADMIN — SODIUM CHLORIDE, PRESERVATIVE FREE 10 ML: 5 INJECTION INTRAVENOUS at 13:39

## 2022-09-20 RX ADMIN — MORPHINE SULFATE 1 MG: 2 INJECTION, SOLUTION INTRAMUSCULAR; INTRAVENOUS at 16:49

## 2022-09-20 RX ADMIN — METOCLOPRAMIDE 10 MG: 5 INJECTION, SOLUTION INTRAMUSCULAR; INTRAVENOUS at 03:23

## 2022-09-20 RX ADMIN — METOCLOPRAMIDE 10 MG: 5 INJECTION, SOLUTION INTRAMUSCULAR; INTRAVENOUS at 22:38

## 2022-09-20 RX ADMIN — ACETAMINOPHEN 650 MG: 325 TABLET ORAL at 11:54

## 2022-09-20 RX ADMIN — PANTOPRAZOLE SODIUM 40 MG: 40 TABLET, DELAYED RELEASE ORAL at 09:07

## 2022-09-20 RX ADMIN — Medication 2 UNITS: at 22:38

## 2022-09-20 RX ADMIN — METOCLOPRAMIDE 10 MG: 5 INJECTION, SOLUTION INTRAMUSCULAR; INTRAVENOUS at 09:07

## 2022-09-20 RX ADMIN — ONDANSETRON 4 MG: 2 INJECTION INTRAMUSCULAR; INTRAVENOUS at 09:55

## 2022-09-20 RX ADMIN — SODIUM CHLORIDE 100 ML/HR: 9 INJECTION, SOLUTION INTRAVENOUS at 11:55

## 2022-09-20 RX ADMIN — VORTIOXETINE 10 MG: 10 TABLET, FILM COATED ORAL at 09:07

## 2022-09-20 RX ADMIN — Medication 3 UNITS: at 13:39

## 2022-09-20 RX ADMIN — PROMETHAZINE HYDROCHLORIDE 12.5 MG: 25 SUPPOSITORY RECTAL at 13:39

## 2022-09-20 RX ADMIN — METOCLOPRAMIDE 10 MG: 5 INJECTION, SOLUTION INTRAMUSCULAR; INTRAVENOUS at 16:49

## 2022-09-20 NOTE — PROGRESS NOTES
End of Shift Note    Bedside shift change report given to Zayra Hook (oncoming nurse) by Toshia Cordon RN (offgoing nurse). Report included the following information SBAR, Kardex, ED Summary, Intake/Output, MAR, Recent Results, and Cardiac Rhythm NSR    Shift worked:  1900-077     Shift summary and any significant changes:     Patient no c/o pain. Patient vitals stable. Patient tolerating clear liquids. Voiding QS. Patient slept most of night. In house CPAP in place. O2 > 98%. Na lab = 140 this AM.   Concerns for physician to address:  none     Zone phone for oncoming shift:  [phone]       Activity:  Activity Level: Up ad aelxx  Number times ambulated in hallways past shift: 0  Number of times OOB to chair past shift: 0    Cardiac:   Cardiac Monitoring: Yes      Cardiac Rhythm: Sinus Rhythm    Access:  Current line(s): PIV     Genitourinary:   Urinary status: voiding    Respiratory:   O2 Device: None (Room air)  Chronic home O2 use?: NO  Incentive spirometer at bedside: NO       GI:  Last Bowel Movement Date: 09/19/22  Current diet:  ADULT DIET Clear Liquid; 3 carb choices (45 gm/meal); GI Liberty (GERD/Peptic Ulcer)  Passing flatus: YES  Tolerating current diet: YES       Pain Management:   Patient states pain is manageable on current regimen: YES    Skin:  Lemuel Score: 21  Interventions: increase time out of bed    Patient Safety:  Fall Score:  Total Score: 1  Interventions: bed/chair alarm, assistive device (walker, cane, etc), gripper socks, and pt to call before getting OOB       Length of Stay:  Expected LOS: - - -  Actual LOS: 0      Jolene Nye RN

## 2022-09-20 NOTE — PROGRESS NOTES
Hospitalist Progress Note    NAME: Morena Rodrigues   :  1980   MRN:  [id number]     Subjective:   Daily Progress Note: 2022 8:46 AM      Chief complaint: Intractable nausea and vomiting  Patient seen and examined, chart was reviewed. Patient nausea vomiting has improved. She wants to continue clear liquid diet today. She denies abdominal pain at this time. She does not feel comfortable going home today. Appreciate GI input and recommendations.        Current Facility-Administered Medications   Medication Dose Route Frequency    acetaminophen (TYLENOL) tablet 650 mg  650 mg Oral Q6H PRN    sodium chloride (NS) flush 5-40 mL  5-40 mL IntraVENous Q8H    sodium chloride (NS) flush 5-40 mL  5-40 mL IntraVENous PRN    acetaminophen (TYLENOL) tablet 650 mg  650 mg Oral Q6H PRN    Or    acetaminophen (TYLENOL) suppository 650 mg  650 mg Rectal Q6H PRN    polyethylene glycol (MIRALAX) packet 17 g  17 g Oral DAILY PRN    ondansetron (ZOFRAN ODT) tablet 4 mg  4 mg Oral Q8H PRN    Or    ondansetron (ZOFRAN) injection 4 mg  4 mg IntraVENous Q6H PRN    enoxaparin (LOVENOX) injection 40 mg  40 mg SubCUTAneous DAILY    pantoprazole (PROTONIX) tablet 40 mg  40 mg Oral BID    vortioxetine (TRINTELLIX) tablet 10 mg  10 mg Oral DAILY    insulin glargine (LANTUS) injection 60 Units  60 Units SubCUTAneous QHS    0.9% sodium chloride infusion  100 mL/hr IntraVENous CONTINUOUS    insulin lispro (HUMALOG) injection   SubCUTAneous AC&HS    glucose chewable tablet 16 g  4 Tablet Oral PRN    glucagon (GLUCAGEN) injection 1 mg  1 mg IntraMUSCular PRN    dextrose 10% infusion 0-250 mL  0-250 mL IntraVENous PRN    metoclopramide HCl (REGLAN) injection 10 mg  10 mg IntraVENous Q6H          Objective:     Visit Vitals  BP (!) 161/75   Pulse 80   Temp 97.9 °F (36.6 °C)   Resp 18   Ht 5' 2\" (1.575 m)   Wt 127.7 kg (281 lb 8.4 oz)   LMP 2022   SpO2 100%   BMI 51.49 kg/m²    O2 Flow Rate (L/min): 2 l/min O2 Device: CPAP mask    Temp (24hrs), Av.2 °F (36.8 °C), Min:97.9 °F (36.6 °C), Max:98.5 °F (36.9 °C)        PHYSICAL EXAM:  General obese  Neck Short  CVS RRR  Respiratory symmetric expansion  Abdomen obese, soft  Extremities moves all  Neuro AAOx3  Psych normal affect  Skin no visible rash    Data Review    Recent Results (from the past 24 hour(s))   GLUCOSE, POC    Collection Time: 22 10:44 AM   Result Value Ref Range    Glucose (POC) 149 (H) 65 - 117 mg/dL    Performed by Raleigh Border    GLUCOSE, POC    Collection Time: 22  5:16 PM   Result Value Ref Range    Glucose (POC) 87 65 - 117 mg/dL    Performed by [facility]    GLUCOSE, POC    Collection Time: 22 10:37 PM   Result Value Ref Range    Glucose (POC) 104 65 - 117 mg/dL    Performed by Rhys Cronin ([facility])    METABOLIC PANEL, BASIC    Collection Time: 22  3:45 AM   Result Value Ref Range    Sodium 140 136 - 145 mmol/L    Potassium 3.9 3.5 - 5.1 mmol/L    Chloride 112 (H) 97 - 108 mmol/L    CO2 21 21 - 32 mmol/L    Anion gap 7 5 - 15 mmol/L    Glucose 84 65 - 100 mg/dL    BUN 29 (H) 6 - 20 MG/DL    Creatinine 1.61 (H) 0.55 - 1.02 MG/DL    BUN/Creatinine ratio 18 12 - 20      GFR est AA 43 (L) >60 ml/min/1.73m2    GFR est non-AA 35 (L) >60 ml/min/1.73m2    Calcium 8.5 8.5 - 10.1 MG/DL   GLUCOSE, POC    Collection Time: 22  8:37 AM   Result Value Ref Range    Glucose (POC) 130 (H) 65 - 117 mg/dL    Performed by Raleigh Border      No results found for this visit on 22.   All Micro Results       None              Assessment/Plan:   Intractable nausea/vomiting-suspected gastroparesis  Recently had abnormal gastric emptying study at [address] [place] [address]  details unknown  Continue current supportive treatment  Cont iv regaln consider transitioning to p.o. if clinically improved  She wants to continue clear liquid diet today  Follow GI recommendations  Monitor and adjust dose accordingly  CT abd /p neg      Renal failure-unclear CLAUDIA versus CKD stage unknown. Monitor creatinine with IV fluids. DMT2-continue PTA meds and adjust accordingly, monitor BG. Endo to see.  A1c 8.1   MDD/ALON-continue home PTA meds  Leukocytosis-suspect reactive monitor clinically  DAVID-continue CPAP  Recent [id number] postop care with surgeon after discharge  Morbid obesity-would benefit from weight loss, exercise and lifestyle modifications    ADFC  DVT PRx Lovenox  NOK  Dispo: 1-2 days pending medical medical progress, clinical stability, advance diet as tolerated           Signed By: Ever Oro MD     September 20, 2022

## 2022-09-20 NOTE — PROGRESS NOTES
Nutrition Note    Consult received, attempted to discuss diet recommendations with pt but she was feeling too poorly today. Left handouts at bedside in case she feels up to looking over them tonight. Will revisit her tomorrow. RD ordered Ensure Clear BID.     Electronically signed by Hernandez Cummings on 9/20/2022 at 3:17 PM

## 2022-09-20 NOTE — PROGRESS NOTES
Problem: Falls - Risk of  Goal: *Absence of Falls  Description: Document Ang Rodriguez Fall Risk and appropriate interventions in the flowsheet. Outcome: Progressing Towards Goal  Note: Fall Risk Interventions:            Medication Interventions: Bed/chair exit alarm, Patient to call before getting OOB, Teach patient to arise slowly                   Problem: Diabetes Self-Management  Goal: *Incorporating nutritional management into lifestyle  Description: Describe effect of type, amount and timing of food on blood glucose; list 3 methods for planning meals. Outcome: Progressing Towards Goal  Goal: *Incorporating physical activity into lifestyle  Description: State effect of exercise on blood glucose levels. Outcome: Progressing Towards Goal  Goal: *Developing strategies to promote health/change behavior  Description: Define the ABC's of diabetes; identify appropriate screenings, schedule and personal plan for screenings.   Outcome: Progressing Towards Goal  Goal: *Developing strategies to address psychosocial issues  Description: Describe feelings about living with diabetes; identify support needed and support network  Outcome: Progressing Towards Goal  Goal: *Insulin pump training  Outcome: Progressing Towards Goal  Goal: *Sick day guidelines  Outcome: Progressing Towards Goal  Goal: *Patient Specific Goal (EDIT GOAL, INSERT TEXT)  Outcome: Progressing Towards Goal

## 2022-09-20 NOTE — PROGRESS NOTES
Gastroenterology Daily Progress Note   GIOVANA Kelley for Dr. Margarita Mendez)   [facility] [address] [place] Date: 9/18/2022     Follow up of diabetic gastroparesis     Subjective:       Was better this AM when I saw her so we advanced her diet from clears to full liquids.   I checked back on her at 11:30 and was hunched over holding a pillow against abd with pursed lip breathing stating she'd had N/V and zofran wasn't working  Had eaten a few bites of oatmeal for breakfast  Doesn't feel zofran is working    Current Facility-Administered Medications   Medication Dose Route Frequency    ondansetron (ZOFRAN ODT) tablet 8 mg  8 mg Oral Q8H PRN    Or    ondansetron (ZOFRAN) injection 8 mg  8 mg IntraVENous Q6H PRN    acetaminophen (TYLENOL) tablet 650 mg  650 mg Oral Q6H PRN    sodium chloride (NS) flush 5-40 mL  5-40 mL IntraVENous Q8H    sodium chloride (NS) flush 5-40 mL  5-40 mL IntraVENous PRN    acetaminophen (TYLENOL) tablet 650 mg  650 mg Oral Q6H PRN    Or    acetaminophen (TYLENOL) suppository 650 mg  650 mg Rectal Q6H PRN    polyethylene glycol (MIRALAX) packet 17 g  17 g Oral DAILY PRN    enoxaparin (LOVENOX) injection 40 mg  40 mg SubCUTAneous DAILY    pantoprazole (PROTONIX) tablet 40 mg  40 mg Oral BID    vortioxetine (TRINTELLIX) tablet 10 mg  10 mg Oral DAILY    insulin glargine (LANTUS) injection 60 Units  60 Units SubCUTAneous QHS    0.9% sodium chloride infusion  100 mL/hr IntraVENous CONTINUOUS    insulin lispro (HUMALOG) injection   SubCUTAneous AC&HS    glucose chewable tablet 16 g  4 Tablet Oral PRN    glucagon (GLUCAGEN) injection 1 mg  1 mg IntraMUSCular PRN    dextrose 10% infusion 0-250 mL  0-250 mL IntraVENous PRN    metoclopramide HCl (REGLAN) injection 10 mg  10 mg IntraVENous Q6H        Objective:     Visit Vitals  BP (!) 176/77 (BP 1 Location: Left lower arm, BP Patient Position: At rest)   Pulse 86   Temp 99.2 °F (37.3 °C)   Resp 16   Ht 5' 2\" (1.575 m)   Wt 127.7 kg (281 lb 8.4 oz)   SpO2 96%   BMI 51.49 kg/m²   Blood pressure (!) 176/77, pulse 86, temperature 99.2 °F (37.3 °C), resp. rate 16, height 5' 2\" (1.575 m), weight 127.7 kg (281 lb 8.4 oz), last menstrual period 09/02/2022, SpO2 96 %.    09/20 0701 - 09/20 1900  In: -   Out: 150 [Urine:150]    09/18 1901 - 09/20 0700  In: 8962.1 [P.O.:1120; I.V.:1748.3]  Out: 900 [Urine:900]      Intake/Output Summary (Last 24 hours) at 9/20/2022 1130  Last data filed at 9/20/2022 0916  Gross per 24 hour   Intake 2868. 33 ml   Output 1050 ml   Net 1818.33 ml         Physical Exam:       General: obese white female, ill appearing  Chest:  CTA, No rhonchi, rales or rubs.   Heart: S1, S2, RRR  GI: Soft, NT, ND + bowel sounds  Extremities: no edema   CNS: CN II-XII normal.      Labs:       Recent Results (from the past 24 hour(s))   GLUCOSE, POC    Collection Time: 09/19/22  5:16 PM   Result Value Ref Range    Glucose (POC) 87 65 - 117 mg/dL    Performed by Jerry Rasmussen    GLUCOSE, POC    Collection Time: 09/19/22 10:37 PM   Result Value Ref Range    Glucose (POC) 104 65 - 117 mg/dL    Performed by [facility]    METABOLIC PANEL, BASIC    Collection Time: 09/20/22  3:45 AM   Result Value Ref Range    Sodium 140 136 - 145 mmol/L    Potassium 3.9 3.5 - 5.1 mmol/L    Chloride 112 (H) 97 - 108 mmol/L    CO2 21 21 - 32 mmol/L    Anion gap 7 5 - 15 mmol/L    Glucose 84 65 - 100 mg/dL    BUN 29 (H) 6 - 20 MG/DL    Creatinine 1.61 (H) 0.55 - 1.02 MG/DL    BUN/Creatinine ratio 18 12 - 20      GFR est AA 43 (L) >60 ml/min/1.73m2    GFR est non-AA 35 (L) >60 ml/min/1.73m2    Calcium 8.5 8.5 - 10.1 MG/DL   GLUCOSE, POC    Collection Time: 09/20/22  8:37 AM   Result Value Ref Range    Glucose (POC) 130 (H) 65 - 117 mg/dL    Performed by Marsha Collado    LABRCNT(wbc:2,hgb:2,hct:2,plt:2,)  Recent Labs     09/20/22  0345 09/19/22  0554 09/18/22  1730    136 135*   K 3.9 4.1 4.5   * 103 103   CO2 21 27 22   BUN 29* 39* 35*   CREA 1.61* 2.31* 1.89*   GLU 84 328* 361*   CA 8.5 8.9 9.3   MG  --  2.1  --    PHOS  --  3.9  --    LABRCNT(sgot:3,gpt:3,ap:3,tbiL:3,TP:3,ALB:3,GLOB:3,ggt:3,aml:3,amyp:3,lpse:3,hlpse:3)No results for input(s): INR, PTP, APTT, INREXT in the last 72 hours. Recent Labs     09/18/22  1730   AP 98   TP 8.1   ALB 3.2*   GLOB 4.9*   LPSE 133   BRIEFLAB(B12,FOL,FOLAT,RBCF)No results found for: FOL, RBCFLABRCNT(CPK:3,CpKMB:3,ckndx:3,troiq:3)No components found for: GLPOCBRIEFLAB(CHOL,CHOLX,CHOLP,CHLST,CHOLV,HDL,HDLC,HDLP,LDL,DLDL,LDLC,DLDLP,TGL,TGLX,TRIGL,TRIGP,CHHD,CHHDX)No results for input(s): PH, PCO2, PO2 in the last 72 hours. LABRCNT(CPK:3,CpKMB:3,ckndx:3,troiq:3)GIOVANA Alarcon  No results for input(s): CPK, CKNDX, TROIQ in the last 72 hours. No lab exists for component: CPKMBMEShGIOVANA Stratton      Problem List:     Active Problems:    Intractable nausea and vomiting (9/19/2022)        Impression:  Diabetic gastroparesis  Morbid obesity  Poorly controlled diabetes         Plan:  Continue reglan 10mg qid and increase zofran to 8mg. Tight glycemic control. Diabetic and gastroparesis diet once able to tolerate solids. Would benefit from diabetic education. Continue clears for now. Consider gastric bypass surgery in the future for morbid obesity and comorbidities.          GIOVANA Cardoza    9/20/2022 [address] [facility] [place], [address] [place] [address]  [address] [id number]  [address] [place] [address] [place]: [phone]

## 2022-09-20 NOTE — PROGRESS NOTES
P&T-Approved DVT Prophylaxis Dosing    Per P&T Committee-approved protocol enoxaparin 40 mg SC dailiy has been adjusted to enoxaparin 30 mg SC BID based on weight and renal function as shown in the table below.          Dotty Stanley, JOSE LD

## 2022-09-21 LAB
ALBUMIN SERPL-MCNC: 2.7 G/DL (ref 3.5–5)
ANION GAP SERPL CALC-SCNC: 7 MMOL/L (ref 5–15)
BASOPHILS # BLD: 0 K/UL (ref 0–0.1)
BASOPHILS NFR BLD: 0 % (ref 0–1)
BUN SERPL-MCNC: 17 MG/DL (ref 6–20)
BUN/CREAT SERPL: 14 (ref 12–20)
CALCIUM SERPL-MCNC: 8.5 MG/DL (ref 8.5–10.1)
CHLORIDE SERPL-SCNC: 110 MMOL/L (ref 97–108)
CO2 SERPL-SCNC: 24 MMOL/L (ref 21–32)
CREAT SERPL-MCNC: 1.24 MG/DL (ref 0.55–1.02)
DIFFERENTIAL METHOD BLD: ABNORMAL
EOSINOPHIL # BLD: 0.1 K/UL (ref 0–0.4)
EOSINOPHIL NFR BLD: 1 % (ref 0–7)
ERYTHROCYTE [DISTWIDTH] IN BLOOD BY AUTOMATED COUNT: 14.2 % (ref 11.5–14.5)
GLUCOSE BLD STRIP.AUTO-MCNC: 116 MG/DL (ref 65–117)
GLUCOSE BLD STRIP.AUTO-MCNC: 226 MG/DL (ref 65–117)
GLUCOSE BLD STRIP.AUTO-MCNC: 233 MG/DL (ref 65–117)
GLUCOSE BLD STRIP.AUTO-MCNC: 336 MG/DL (ref 65–117)
GLUCOSE SERPL-MCNC: 203 MG/DL (ref 65–100)
HCT VFR BLD AUTO: 31 % (ref 35–47)
HGB BLD-MCNC: 10 G/DL (ref 11.5–16)
IMM GRANULOCYTES # BLD AUTO: 0.2 K/UL (ref 0–0.04)
IMM GRANULOCYTES NFR BLD AUTO: 1 % (ref 0–0.5)
LYMPHOCYTES # BLD: 3 K/UL (ref 0.8–3.5)
LYMPHOCYTES NFR BLD: 19 % (ref 12–49)
MAGNESIUM SERPL-MCNC: 2 MG/DL (ref 1.6–2.4)
MCH RBC QN AUTO: 27 PG (ref 26–34)
MCHC RBC AUTO-ENTMCNC: 32.3 G/DL (ref 30–36.5)
MCV RBC AUTO: 83.8 FL (ref 80–99)
MONOCYTES # BLD: 0.8 K/UL (ref 0–1)
MONOCYTES NFR BLD: 5 % (ref 5–13)
NEUTS SEG # BLD: 11.5 K/UL (ref 1.8–8)
NEUTS SEG NFR BLD: 74 % (ref 32–75)
NRBC # BLD: 0 K/UL (ref 0–0.01)
NRBC BLD-RTO: 0 PER 100 WBC
PHOSPHATE SERPL-MCNC: 2.4 MG/DL (ref 2.6–4.7)
PLATELET # BLD AUTO: 319 K/UL (ref 150–400)
PMV BLD AUTO: 8.9 FL (ref 8.9–12.9)
POTASSIUM SERPL-SCNC: 3.7 MMOL/L (ref 3.5–5.1)
RBC # BLD AUTO: 3.7 M/UL (ref 3.8–5.2)
SERVICE CMNT-IMP: ABNORMAL
SERVICE CMNT-IMP: NORMAL
SODIUM SERPL-SCNC: 141 MMOL/L (ref 136–145)
WBC # BLD AUTO: 15.6 K/UL (ref 3.6–11)

## 2022-09-21 PROCEDURE — 82962 GLUCOSE BLOOD TEST: CPT

## 2022-09-21 PROCEDURE — 74011250637 HC RX REV CODE- 250/637: Performed by: INTERNAL MEDICINE

## 2022-09-21 PROCEDURE — 74011636637 HC RX REV CODE- 636/637: Performed by: STUDENT IN AN ORGANIZED HEALTH CARE EDUCATION/TRAINING PROGRAM

## 2022-09-21 PROCEDURE — 74011250636 HC RX REV CODE- 250/636: Performed by: INTERNAL MEDICINE

## 2022-09-21 PROCEDURE — 74011636637 HC RX REV CODE- 636/637: Performed by: FAMILY MEDICINE

## 2022-09-21 PROCEDURE — 80069 RENAL FUNCTION PANEL: CPT

## 2022-09-21 PROCEDURE — 74011250636 HC RX REV CODE- 250/636: Performed by: SPECIALIST

## 2022-09-21 PROCEDURE — 74011000250 HC RX REV CODE- 250: Performed by: STUDENT IN AN ORGANIZED HEALTH CARE EDUCATION/TRAINING PROGRAM

## 2022-09-21 PROCEDURE — 94760 N-INVAS EAR/PLS OXIMETRY 1: CPT

## 2022-09-21 PROCEDURE — 76937 US GUIDE VASCULAR ACCESS: CPT

## 2022-09-21 PROCEDURE — 74011250637 HC RX REV CODE- 250/637: Performed by: STUDENT IN AN ORGANIZED HEALTH CARE EDUCATION/TRAINING PROGRAM

## 2022-09-21 PROCEDURE — 74011250636 HC RX REV CODE- 250/636: Performed by: HOSPITALIST

## 2022-09-21 PROCEDURE — 36415 COLL VENOUS BLD VENIPUNCTURE: CPT

## 2022-09-21 PROCEDURE — 65270000029 HC RM PRIVATE

## 2022-09-21 PROCEDURE — G0378 HOSPITAL OBSERVATION PER HR: HCPCS

## 2022-09-21 PROCEDURE — 83735 ASSAY OF MAGNESIUM: CPT

## 2022-09-21 PROCEDURE — 74011250636 HC RX REV CODE- 250/636: Performed by: FAMILY MEDICINE

## 2022-09-21 PROCEDURE — 85025 COMPLETE CBC W/AUTO DIFF WBC: CPT

## 2022-09-21 RX ORDER — AMLODIPINE BESYLATE 5 MG/1
10 TABLET ORAL DAILY
Status: DISCONTINUED | OUTPATIENT
Start: 2022-09-21 | End: 2022-09-23 | Stop reason: HOSPADM

## 2022-09-21 RX ORDER — HYDRALAZINE HYDROCHLORIDE 20 MG/ML
10 INJECTION INTRAMUSCULAR; INTRAVENOUS
Status: DISCONTINUED | OUTPATIENT
Start: 2022-09-21 | End: 2022-09-23 | Stop reason: HOSPADM

## 2022-09-21 RX ADMIN — SODIUM CHLORIDE, PRESERVATIVE FREE 10 ML: 5 INJECTION INTRAVENOUS at 06:31

## 2022-09-21 RX ADMIN — PANTOPRAZOLE SODIUM 40 MG: 40 TABLET, DELAYED RELEASE ORAL at 18:21

## 2022-09-21 RX ADMIN — PANTOPRAZOLE SODIUM 40 MG: 40 TABLET, DELAYED RELEASE ORAL at 09:52

## 2022-09-21 RX ADMIN — VORTIOXETINE 10 MG: 10 TABLET, FILM COATED ORAL at 09:52

## 2022-09-21 RX ADMIN — METOCLOPRAMIDE 10 MG: 5 INJECTION, SOLUTION INTRAMUSCULAR; INTRAVENOUS at 21:05

## 2022-09-21 RX ADMIN — HYDRALAZINE HYDROCHLORIDE 10 MG: 20 INJECTION INTRAMUSCULAR; INTRAVENOUS at 18:28

## 2022-09-21 RX ADMIN — SODIUM CHLORIDE, PRESERVATIVE FREE 10 ML: 5 INJECTION INTRAVENOUS at 17:11

## 2022-09-21 RX ADMIN — METOCLOPRAMIDE 10 MG: 5 INJECTION, SOLUTION INTRAMUSCULAR; INTRAVENOUS at 17:10

## 2022-09-21 RX ADMIN — HYDRALAZINE HYDROCHLORIDE 10 MG: 20 INJECTION INTRAMUSCULAR; INTRAVENOUS at 23:28

## 2022-09-21 RX ADMIN — Medication 2 UNITS: at 22:27

## 2022-09-21 RX ADMIN — INSULIN GLARGINE 60 UNITS: 100 INJECTION, SOLUTION SUBCUTANEOUS at 21:02

## 2022-09-21 RX ADMIN — Medication 3 UNITS: at 18:19

## 2022-09-21 RX ADMIN — SODIUM CHLORIDE 100 ML/HR: 9 INJECTION, SOLUTION INTRAVENOUS at 18:19

## 2022-09-21 RX ADMIN — Medication 7 UNITS: at 12:39

## 2022-09-21 RX ADMIN — AMLODIPINE BESYLATE 10 MG: 5 TABLET ORAL at 18:20

## 2022-09-21 RX ADMIN — METOCLOPRAMIDE 10 MG: 5 INJECTION, SOLUTION INTRAMUSCULAR; INTRAVENOUS at 04:57

## 2022-09-21 RX ADMIN — SODIUM CHLORIDE, PRESERVATIVE FREE 10 ML: 5 INJECTION INTRAVENOUS at 22:28

## 2022-09-21 RX ADMIN — METOCLOPRAMIDE 10 MG: 5 INJECTION, SOLUTION INTRAMUSCULAR; INTRAVENOUS at 09:51

## 2022-09-21 NOTE — PROGRESS NOTES
Gastroenterology Daily Progress Note   GIOVANA Dietrich for Dr. Raissa Walters)   [address] [facility] [address] [place] Date: 9/18/2022     Follow up of diabetic gastroparesis     Subjective:       Patient was seen earlier this AM on rounds and was feeling better. Was able to sleep through the night without vomiting. Nausea and pain had both resolved. XR Results (most recent):  Results from Hospital Encounter encounter on 09/18/22    XR ABD (KUB)    Narrative  EXAM: XR ABD (KUB)    INDICATION: nausea and vomiting    COMPARISON: 9/18/2022. FINDINGS: A supine radiograph of the abdomen shows a paucity of the bowel gas. No soft tissue masses or pathologic calcifications are identified. The bones and  soft tissues are within normal limits. Impression  Nonspecific bowel gas pattern    Lab Results   Component Value Date/Time    WBC 15.6 (H) 09/21/2022 09:50 AM    HGB 10.0 (L) 09/21/2022 09:50 AM    HCT 31.0 (L) 09/21/2022 09:50 AM    PLATELET 586 78/26/8091 09:50 AM    MCV 83.8 09/21/2022 09:50 AM     Lab Results   Component Value Date/Time    Sodium 141 09/21/2022 09:50 AM    Potassium 3.7 09/21/2022 09:50 AM    Chloride 110 (H) 09/21/2022 09:50 AM    CO2 24 09/21/2022 09:50 AM    Anion gap 7 09/21/2022 09:50 AM    Glucose 203 (H) 09/21/2022 09:50 AM    BUN 17 09/21/2022 09:50 AM    Creatinine 1.24 (H) 09/21/2022 09:50 AM    BUN/Creatinine ratio 14 09/21/2022 09:50 AM    GFR est AA 58 (L) 09/21/2022 09:50 AM    GFR est non-AA 48 (L) 09/21/2022 09:50 AM    Calcium 8.5 09/21/2022 09:50 AM    Bilirubin, total 0.5 09/18/2022 05:30 PM    Alk.  phosphatase 98 09/18/2022 05:30 PM    Protein, total 8.1 09/18/2022 05:30 PM    Albumin 2.7 (L) 09/21/2022 09:50 AM    Globulin 4.9 (H) 09/18/2022 05:30 PM    A-G Ratio 0.7 (L) 09/18/2022 05:30 PM    ALT (SGPT) 28 09/18/2022 05:30 PM    AST (SGOT) 13 (L) 09/18/2022 05:30 PM         Current Facility-Administered Medications   Medication Dose Route Frequency ondansetron (ZOFRAN ODT) tablet 8 mg  8 mg Oral Q8H PRN    Or    ondansetron (ZOFRAN) injection 8 mg  8 mg IntraVENous Q6H PRN    enoxaparin (LOVENOX) injection 30 mg  30 mg SubCUTAneous BID    acetaminophen (TYLENOL) tablet 650 mg  650 mg Oral Q6H PRN    sodium chloride (NS) flush 5-40 mL  5-40 mL IntraVENous Q8H    sodium chloride (NS) flush 5-40 mL  5-40 mL IntraVENous PRN    acetaminophen (TYLENOL) tablet 650 mg  650 mg Oral Q6H PRN    Or    acetaminophen (TYLENOL) suppository 650 mg  650 mg Rectal Q6H PRN    polyethylene glycol (MIRALAX) packet 17 g  17 g Oral DAILY PRN    pantoprazole (PROTONIX) tablet 40 mg  40 mg Oral BID    vortioxetine (TRINTELLIX) tablet 10 mg  10 mg Oral DAILY    insulin glargine (LANTUS) injection 60 Units  60 Units SubCUTAneous QHS    0.9% sodium chloride infusion  100 mL/hr IntraVENous CONTINUOUS    insulin lispro (HUMALOG) injection   SubCUTAneous AC&HS    glucose chewable tablet 16 g  4 Tablet Oral PRN    glucagon (GLUCAGEN) injection 1 mg  1 mg IntraMUSCular PRN    dextrose 10% infusion 0-250 mL  0-250 mL IntraVENous PRN    metoclopramide HCl (REGLAN) injection 10 mg  10 mg IntraVENous Q6H        Objective:     Visit Vitals  BP (!) 165/66   Pulse 97   Temp 98.5 °F (36.9 °C)   Resp 18   Ht 5' 2\" (1.575 m)   Wt 127.7 kg (281 lb 8.4 oz)   SpO2 99%   BMI 51.49 kg/m²   Blood pressure (!) 165/66, pulse 97, temperature 98.5 °F (36.9 °C), resp. rate 18, height 5' 2\" (1.575 m), weight 127.7 kg (281 lb 8.4 oz), last menstrual period 09/02/2022, SpO2 99 %. No intake/output data recorded. 09/19 1901 - 09/21 0700  In: 5068.3 [P.O.:1120; I.V.:3948.3]  Out: 1050 [Urine:1050]      Intake/Output Summary (Last 24 hours) at 9/21/2022 1145  Last data filed at 9/21/2022 0456  Gross per 24 hour   Intake 2200 ml   Output --   Net 2200 ml         Physical Exam:       General: obese white female, NAD  Chest:  CTA, No rhonchi, rales or rubs.   Heart: S1, S2, RRR  GI: Soft, NT, ND + bowel sounds  Extremities: no edema   CNS: CN II-XII normal.      Labs:       Recent Results (from the past 24 hour(s))   GLUCOSE, POC    Collection Time: 09/20/22 12:13 PM   Result Value Ref Range    Glucose (POC) 224 (H) 65 - 117 mg/dL    Performed by Kunal Cortes    CBC WITH AUTOMATED DIFF    Collection Time: 09/20/22  2:51 PM   Result Value Ref Range    WBC 16.5 (H) 3.6 - 11.0 K/uL    RBC 3.69 (L) 3.80 - 5.20 M/uL    HGB 10.0 (L) 11.5 - 16.0 g/dL    HCT 31.0 (L) 35.0 - 47.0 %    MCV 84.0 80.0 - 99.0 FL    MCH 27.1 26.0 - 34.0 PG    MCHC 32.3 30.0 - 36.5 g/dL    RDW 14.0 11.5 - 14.5 %    PLATELET 404 612 - 896 K/uL    MPV 9.1 8.9 - 12.9 FL    NRBC 0.0 0  WBC    ABSOLUTE NRBC 0.00 0.00 - 0.01 K/uL    NEUTROPHILS 86 (H) 32 - 75 %    LYMPHOCYTES 9 (L) 12 - 49 %    MONOCYTES 3 (L) 5 - 13 %    EOSINOPHILS 0 0 - 7 %    BASOPHILS 0 0 - 1 %    IMMATURE GRANULOCYTES 2 (H) 0.0 - 0.5 %    ABS. NEUTROPHILS 14.2 (H) 1.8 - 8.0 K/UL    ABS. LYMPHOCYTES 1.5 0.8 - 3.5 K/UL    ABS. MONOCYTES 0.4 0.0 - 1.0 K/UL    ABS. EOSINOPHILS 0.0 0.0 - 0.4 K/UL    ABS. BASOPHILS 0.0 0.0 - 0.1 K/UL    ABS. IMM.  GRANS. 0.3 (H) 0.00 - 0.04 K/UL    DF AUTOMATED     GLUCOSE, POC    Collection Time: 09/20/22  4:28 PM   Result Value Ref Range    Glucose (POC) 234 (H) 65 - 117 mg/dL    Performed by Alban RICH    DRUG SCREEN, URINE    Collection Time: 09/20/22  4:59 PM   Result Value Ref Range    AMPHETAMINES Negative NEG      BARBITURATES Negative NEG      BENZODIAZEPINES Negative NEG      COCAINE Negative NEG      METHADONE Negative NEG      OPIATES Negative NEG      PCP(PHENCYCLIDINE) Negative NEG      THC (TH-CANNABINOL) Negative NEG      Drug screen comment (NOTE)    GLUCOSE, POC    Collection Time: 09/20/22  9:53 PM   Result Value Ref Range    Glucose (POC) 245 (H) 65 - 117 mg/dL    Performed by Silvia RICH    GLUCOSE, POC    Collection Time: 09/21/22  8:33 AM   Result Value Ref Range    Glucose (POC) 116 65 - 117 mg/dL Performed by Jigna Monahan RN    CBC WITH AUTOMATED DIFF    Collection Time: 09/21/22  9:50 AM   Result Value Ref Range    WBC 15.6 (H) 3.6 - 11.0 K/uL    RBC 3.70 (L) 3.80 - 5.20 M/uL    HGB 10.0 (L) 11.5 - 16.0 g/dL    HCT 31.0 (L) 35.0 - 47.0 %    MCV 83.8 80.0 - 99.0 FL    MCH 27.0 26.0 - 34.0 PG    MCHC 32.3 30.0 - 36.5 g/dL    RDW 14.2 11.5 - 14.5 %    PLATELET 721 918 - 309 K/uL    MPV 8.9 8.9 - 12.9 FL    NRBC 0.0 0  WBC    ABSOLUTE NRBC 0.00 0.00 - 0.01 K/uL    NEUTROPHILS 74 32 - 75 %    LYMPHOCYTES 19 12 - 49 %    MONOCYTES 5 5 - 13 %    EOSINOPHILS 1 0 - 7 %    BASOPHILS 0 0 - 1 %    IMMATURE GRANULOCYTES 1 (H) 0.0 - 0.5 %    ABS. NEUTROPHILS 11.5 (H) 1.8 - 8.0 K/UL    ABS. LYMPHOCYTES 3.0 0.8 - 3.5 K/UL    ABS. MONOCYTES 0.8 0.0 - 1.0 K/UL    ABS. EOSINOPHILS 0.1 0.0 - 0.4 K/UL    ABS. BASOPHILS 0.0 0.0 - 0.1 K/UL    ABS. IMM.  GRANS. 0.2 (H) 0.00 - 0.04 K/UL    DF AUTOMATED     RENAL FUNCTION PANEL    Collection Time: 09/21/22  9:50 AM   Result Value Ref Range    Sodium 141 136 - 145 mmol/L    Potassium 3.7 3.5 - 5.1 mmol/L    Chloride 110 (H) 97 - 108 mmol/L    CO2 24 21 - 32 mmol/L    Anion gap 7 5 - 15 mmol/L    Glucose 203 (H) 65 - 100 mg/dL    BUN 17 6 - 20 MG/DL    Creatinine 1.24 (H) 0.55 - 1.02 MG/DL    BUN/Creatinine ratio 14 12 - 20      GFR est AA 58 (L) >60 ml/min/1.73m2    GFR est non-AA 48 (L) >60 ml/min/1.73m2    Calcium 8.5 8.5 - 10.1 MG/DL    Phosphorus 2.4 (L) 2.6 - 4.7 MG/DL    Albumin 2.7 (L) 3.5 - 5.0 g/dL   MAGNESIUM    Collection Time: 09/21/22  9:50 AM   Result Value Ref Range    Magnesium 2.0 1.6 - 2.4 mg/dL   GLUCOSE, POC    Collection Time: 09/21/22 11:12 AM   Result Value Ref Range    Glucose (POC) 336 (H) 65 - 117 mg/dL    Performed by Marnie Peacock (PCT)    LABRCNT(wbc:2,hgb:2,hct:2,plt:2,)  Recent Labs     09/21/22  0950 09/20/22  0345 09/19/22  0554    140 136   K 3.7 3.9 4.1   * 112* 103   CO2 24 21 27   BUN 17 29* 39*   CREA 1.24* 1.61* 2.31* * 84 328*   CA 8.5 8.5 8.9   MG 2.0  --  2.1   PHOS 2.4*  --  3.9   LABRCNT(sgot:3,gpt:3,ap:3,tbiL:3,TP:3,ALB:3,GLOB:3,ggt:3,aml:3,amyp:3,lpse:3,hlpse:3)No results for input(s): INR, PTP, APTT, INREXT, INREXT in the last 72 hours. Recent Labs     09/21/22  0950 09/18/22  1730   AP  --  98   TP  --  8.1   ALB 2.7* 3.2*   GLOB  --  4.9*   LPSE  --  133   BRIEFLAB(B12,FOL,FOLAT,RBCF)No results found for: FOL, RBCFLABRCNT(CPK:3,CpKMB:3,ckndx:3,troiq:3)No components found for: GLPOCBRIEFLAB(CHOL,CHOLX,CHOLP,CHLST,CHOLV,HDL,HDLC,HDLP,LDL,DLDL,LDLC,DLDLP,TGL,TGLX,TRIGL,TRIGP,CHHD,CHHDX)No results for input(s): PH, PCO2, PO2 in the last 72 hours. LABRCNT(CPK:3,CpKMB:3,ckndx:3,troiq:3)GIOVANA Cohen  No results for input(s): CPK, CKNDX, TROIQ in the last 72 hours. No lab exists for component: CPKMBMEShGIOVANA Tovar      Problem List:     Active Problems:    Intractable nausea and vomiting (9/19/2022)      Impression:  Diabetic gastroparesis  Morbid obesity  Poorly controlled diabetes         Plan:  Continue reglan 10mg qid and zofran 8mg PRN. Tight glycemic control. Diabetic and gastroparesis diet once able to tolerate solids. Would benefit from diabetic education. Continue clears for now per patient wishes. Advance as tolerated  Consider gastric bypass surgery in the future for morbid obesity and comorbidities.          GIOVANA Dupont    9/21/2022 [address] [facility], [address] [place] [address]  [address] [id number]  [address] [place] [address] [place]: [phone]

## 2022-09-21 NOTE — PROGRESS NOTES
End of Shift Note    Bedside shift change report given to Shelly Martinez (oncoming nurse) by Vida Brenner RN (offgoing nurse). Report included the following information SBAR, Kardex, and MAR    Shift worked:  7a - 7p     Shift summary and any significant changes:      Pt seen by nutrititionist and GI. Diet advanced from clear liquids to full liquids. Pt unable to tolerate this. Multiple episodes of vomiting today. Scheduled reglan, prn zofran, prn phenergan, and prn morphine given. Morphine seemed to be the only one to allow her to stop vomiting and rest while also easing her abd pain. Diet changed back to clear liquids. Labs drawn by the PICC team     UDS neg     KUB normal     Concerns for physician to address:       Zone phone for oncoming shift:   [phone]       Activity:  Activity Level: Up ad alexx  Number times ambulated in hallways past shift: 0  Number of times OOB to chair past shift: 1    Cardiac:   Cardiac Monitoring: Yes      Cardiac Rhythm: Sinus Rhythm    Access:  Current line(s): PIV     Genitourinary:   Urinary status: voiding    Respiratory:   O2 Device: None (Room air)  Chronic home O2 use?: NO  Incentive spirometer at bedside: NO       GI:  Last Bowel Movement Date: 09/19/22  Current diet:  ADULT ORAL NUTRITION SUPPLEMENT Breakfast, Dinner; Clear Liquid  ADULT DIET Clear Liquid; 3 carb choices (45 gm/meal); GI Taylor (GERD/Peptic Ulcer); Apple Ensure clear only please  Passing flatus: YES  Tolerating current diet: YES       Pain Management:   Patient states pain is manageable on current regimen: YES    Skin:  Lemuel Score: 21  Interventions: speciality bed, float heels, increase time out of bed, and nutritional support     Patient Safety:  Fall Score:  Total Score: 1  Interventions: gripper socks, pt to call before getting OOB, stay with me (per policy), and gait belt       Length of Stay:  Expected LOS: - - -  Actual LOS: 0      Vida Brenner RN

## 2022-09-21 NOTE — PROGRESS NOTES
Endurance Catheter insertion note     Inserted 20 G, 8 cm long  Endurance Extended Dwell Peripheral Catheter into right upper arm using ultrasound guidance and sterile technique. Positive blood return. Patient tolerated procedure well. Denies questions or concerns at this time. The Endurance catheter is an extended dwell peripheral catheter and may remain in place 29 days. Blood samples can be obtained from this catheter. To obtain labs, a tourniquet may be used, flush with 10ml NS, waste 3ml, draw labs, flush with 20ml NS. Dressings needs to be changed with central line dressing kit using bio patch every 7 days by nurse. Primary nurse        RN notified.         [address] [facility] Vascular Access Team. PICC Nurse

## 2022-09-21 NOTE — PROGRESS NOTES
Comprehensive Nutrition Assessment    Type and Reason for Visit: Patient education    Nutrition Recommendations/Plan:   Advance diet and supplements as tolerated  Please document % meals and supplements consumed in flowsheet I/O's under intake      Malnutrition Assessment:  Malnutrition Status:  No malnutrition (09/21/22 1535)      Nutrition Assessment:    Chart reviewed, med noted for diabetic gastroparesis. Significant PMH of renal failure, DM, DAVID, and morbid obesity. Nutrition education for gastroparesis given. Answered pt's questions regarding long term consequences of non-adherence. Will advance pt's supplements once diet advances to full liquids. Encouraged pt to look over the menu and find foods to try once she feels ready to advance her diet. Nutrition Education:  Educated on Diet recommendations for diabetic gastroparesis  Learners: Patient  Readiness: Acceptance  Method: Explanation and Handout  Response: Verbalizes Understanding  Contact name and number provided. Nutrition Related Findings:    Labs: -336, phos 2.4; Meds: lovenox, lantus, humalog, protonix, zofran prn, miralax prn Wound Type: None    Current Nutrition Intake & Therapies:  Average Meal Intake: 51-75%  Average Supplement Intake: 51-75%  ADULT ORAL NUTRITION SUPPLEMENT Breakfast, Dinner; Clear Liquid  ADULT DIET Clear Liquid; 3 carb choices (45 gm/meal); GI Cabell (GERD/Peptic Ulcer); Apple Ensure clear only please    Anthropometric Measures:  Height: 5' 2\" (157.5 cm)  Ideal Body Weight (IBW): 110 lbs (50 kg)     Current Body Wt:  127.7 kg (281 lb 8.4 oz), 255.9 % IBW.  Standing scale  Current BMI (kg/m2): 51.5        Weight Adjustment: No adjustment                 BMI Category: Obese class 3 (BMI 40.0 or greater)    Estimated Daily Nutrient Needs:  Energy Requirements Based On: Formula  Weight Used for Energy Requirements: Current  Energy (kcal/day): 2000 MSJ (BMR x 1.3 AF - 500 for wt loss)  Weight Used for Protein Requirements: Ideal  Protein (g/day): 50g (1.0g/kg IBW)  Method Used for Fluid Requirements: 1 ml/kcal  Fluid (ml/day): 2200 ml    Nutrition Diagnosis:   Altered GI function related to food and nutrition related knowledge deficit as evidenced by nausea, vomiting    Nutrition Interventions:   Food and/or Nutrient Delivery: Modify current diet, Modify oral nutrition supplement (advance diet/supplements as tolerated)  Nutrition Education/Counseling: Survival skills/brief education completed  Coordination of Nutrition Care: Continue to monitor while inpatient       Goals:     Goals: PO intake 50% or greater, by next RD assessment       Nutrition Monitoring and Evaluation:   Behavioral-Environmental Outcomes: Knowledge or skill  Food/Nutrient Intake Outcomes: Diet advancement/tolerance, Food and nutrient intake  Physical Signs/Symptoms Outcomes: Weight, Nausea/vomiting, Fluid status or edema, Biochemical data    Discharge Planning:    Recommend pursue outpatient diabetes education    Momo Bernardo

## 2022-09-21 NOTE — PROGRESS NOTES
Hospitalist Progress Note    NAME: Jeanette Cortez   :  1980   MRN:  [id number]     Subjective:   Daily Progress Note: 2022 8:46 AM      Chief complaint: Intractable nausea and vomiting    -Patient seen and examined. She did not tolerate soup yesterday. Wants to continue clear liquid diet.          Current Facility-Administered Medications   Medication Dose Route Frequency    ondansetron (ZOFRAN ODT) tablet 8 mg  8 mg Oral Q8H PRN    Or    ondansetron (ZOFRAN) injection 8 mg  8 mg IntraVENous Q6H PRN    enoxaparin (LOVENOX) injection 30 mg  30 mg SubCUTAneous BID    acetaminophen (TYLENOL) tablet 650 mg  650 mg Oral Q6H PRN    sodium chloride (NS) flush 5-40 mL  5-40 mL IntraVENous Q8H    sodium chloride (NS) flush 5-40 mL  5-40 mL IntraVENous PRN    acetaminophen (TYLENOL) tablet 650 mg  650 mg Oral Q6H PRN    Or    acetaminophen (TYLENOL) suppository 650 mg  650 mg Rectal Q6H PRN    polyethylene glycol (MIRALAX) packet 17 g  17 g Oral DAILY PRN    pantoprazole (PROTONIX) tablet 40 mg  40 mg Oral BID    vortioxetine (TRINTELLIX) tablet 10 mg  10 mg Oral DAILY    insulin glargine (LANTUS) injection 60 Units  60 Units SubCUTAneous QHS    0.9% sodium chloride infusion  100 mL/hr IntraVENous CONTINUOUS    insulin lispro (HUMALOG) injection   SubCUTAneous AC&HS    glucose chewable tablet 16 g  4 Tablet Oral PRN    glucagon (GLUCAGEN) injection 1 mg  1 mg IntraMUSCular PRN    dextrose 10% infusion 0-250 mL  0-250 mL IntraVENous PRN    metoclopramide HCl (REGLAN) injection 10 mg  10 mg IntraVENous Q6H          Objective:     Visit Vitals  BP (!) 165/66   Pulse 97   Temp 98.5 °F (36.9 °C)   Resp 18   Ht 5' 2\" (1.575 m)   Wt 127.7 kg (281 lb 8.4 oz)   LMP 2022   SpO2 99%   BMI 51.49 kg/m²    O2 Flow Rate (L/min): 2 l/min O2 Device: None (Room air)    Temp (24hrs), Av.5 °F (36.9 °C), Min:97.7 °F (36.5 °C), Max:98.9 °F (37.2 °C)        PHYSICAL EXAM:  General obese  Neck Short  CVS RRR  Respiratory symmetric expansion  Abdomen obese, soft  Extremities moves all  Neuro AAOx3  Psych normal affect  Skin no visible rash    Data Review    Recent Results (from the past 24 hour(s))   CBC WITH AUTOMATED DIFF    Collection Time: 09/20/22  2:51 PM   Result Value Ref Range    WBC 16.5 (H) 3.6 - 11.0 K/uL    RBC 3.69 (L) 3.80 - 5.20 M/uL    HGB 10.0 (L) 11.5 - 16.0 g/dL    HCT 31.0 (L) 35.0 - 47.0 %    MCV 84.0 80.0 - 99.0 FL    MCH 27.1 26.0 - 34.0 PG    MCHC 32.3 30.0 - 36.5 g/dL    RDW 14.0 11.5 - 14.5 %    PLATELET 070 677 - 516 K/uL    MPV 9.1 8.9 - 12.9 FL    NRBC 0.0 0  WBC    ABSOLUTE NRBC 0.00 0.00 - 0.01 K/uL    NEUTROPHILS 86 (H) 32 - 75 %    LYMPHOCYTES 9 (L) 12 - 49 %    MONOCYTES 3 (L) 5 - 13 %    EOSINOPHILS 0 0 - 7 %    BASOPHILS 0 0 - 1 %    IMMATURE GRANULOCYTES 2 (H) 0.0 - 0.5 %    ABS. NEUTROPHILS 14.2 (H) 1.8 - 8.0 K/UL    ABS. LYMPHOCYTES 1.5 0.8 - 3.5 K/UL    ABS. MONOCYTES 0.4 0.0 - 1.0 K/UL    ABS. EOSINOPHILS 0.0 0.0 - 0.4 K/UL    ABS. BASOPHILS 0.0 0.0 - 0.1 K/UL    ABS. IMM.  GRANS. 0.3 (H) 0.00 - 0.04 K/UL    DF AUTOMATED     GLUCOSE, POC    Collection Time: 09/20/22  4:28 PM   Result Value Ref Range    Glucose (POC) 234 (H) 65 - 117 mg/dL    Performed by Fitz Weldon PCT    DRUG SCREEN, URINE    Collection Time: 09/20/22  4:59 PM   Result Value Ref Range    AMPHETAMINES Negative NEG      BARBITURATES Negative NEG      BENZODIAZEPINES Negative NEG      COCAINE Negative NEG      METHADONE Negative NEG      OPIATES Negative NEG      PCP(PHENCYCLIDINE) Negative NEG      THC (TH-CANNABINOL) Negative NEG      Drug screen comment (NOTE)    GLUCOSE, POC    Collection Time: 09/20/22  9:53 PM   Result Value Ref Range    Glucose (POC) 245 (H) 65 - 117 mg/dL    Performed by Kelsea Perez PCT    GLUCOSE, POC    Collection Time: 09/21/22  8:33 AM   Result Value Ref Range    Glucose (POC) 116 65 - 117 mg/dL    Performed by Ashlee Reagan RN    CBC WITH AUTOMATED DIFF    Collection Time: 09/21/22  9:50 AM   Result Value Ref Range    WBC 15.6 (H) 3.6 - 11.0 K/uL    RBC 3.70 (L) 3.80 - 5.20 M/uL    HGB 10.0 (L) 11.5 - 16.0 g/dL    HCT 31.0 (L) 35.0 - 47.0 %    MCV 83.8 80.0 - 99.0 FL    MCH 27.0 26.0 - 34.0 PG    MCHC 32.3 30.0 - 36.5 g/dL    RDW 14.2 11.5 - 14.5 %    PLATELET 145 658 - 143 K/uL    MPV 8.9 8.9 - 12.9 FL    NRBC 0.0 0  WBC    ABSOLUTE NRBC 0.00 0.00 - 0.01 K/uL    NEUTROPHILS 74 32 - 75 %    LYMPHOCYTES 19 12 - 49 %    MONOCYTES 5 5 - 13 %    EOSINOPHILS 1 0 - 7 %    BASOPHILS 0 0 - 1 %    IMMATURE GRANULOCYTES 1 (H) 0.0 - 0.5 %    ABS. NEUTROPHILS 11.5 (H) 1.8 - 8.0 K/UL    ABS. LYMPHOCYTES 3.0 0.8 - 3.5 K/UL    ABS. MONOCYTES 0.8 0.0 - 1.0 K/UL    ABS. EOSINOPHILS 0.1 0.0 - 0.4 K/UL    ABS. BASOPHILS 0.0 0.0 - 0.1 K/UL    ABS. IMM. GRANS. 0.2 (H) 0.00 - 0.04 K/UL    DF AUTOMATED     RENAL FUNCTION PANEL    Collection Time: 09/21/22  9:50 AM   Result Value Ref Range    Sodium 141 136 - 145 mmol/L    Potassium 3.7 3.5 - 5.1 mmol/L    Chloride 110 (H) 97 - 108 mmol/L    CO2 24 21 - 32 mmol/L    Anion gap 7 5 - 15 mmol/L    Glucose 203 (H) 65 - 100 mg/dL    BUN 17 6 - 20 MG/DL    Creatinine 1.24 (H) 0.55 - 1.02 MG/DL    BUN/Creatinine ratio 14 12 - 20      GFR est AA 58 (L) >60 ml/min/1.73m2    GFR est non-AA 48 (L) >60 ml/min/1.73m2    Calcium 8.5 8.5 - 10.1 MG/DL    Phosphorus 2.4 (L) 2.6 - 4.7 MG/DL    Albumin 2.7 (L) 3.5 - 5.0 g/dL   MAGNESIUM    Collection Time: 09/21/22  9:50 AM   Result Value Ref Range    Magnesium 2.0 1.6 - 2.4 mg/dL   GLUCOSE, POC    Collection Time: 09/21/22 11:12 AM   Result Value Ref Range    Glucose (POC) 336 (H) 65 - 117 mg/dL    Performed by Mary Prabhakar (PCT)      No results found for this visit on 09/18/22.   All Micro Results       None              Assessment/Plan:   Intractable nausea/vomiting-suspected gastroparesis  Recently had abnormal gastric emptying study at [address] [place] [address]  details unknown  Continue current supportive treatment  Cont iv regaln consider transitioning to p.o. if clinically improved  Continue clear liquid diet, advance as tolerated  Follow GI recommendations  Monitor and adjust dose accordingly  CT abd /p neg      Renal failure-unclear CLAUDIA versus CKD stage unknown. Monitor creatinine with IV fluids. DMT2-continue PTA meds and adjust accordingly, monitor BG. Endo to see. A1c 8.1   MDD/ALON-continue home PTA meds  Leukocytosis-suspect reactive monitor clinically  DAVID-continue CPAP  Recent [id number] postop care with surgeon after discharge  Morbid obesity-would benefit from weight loss, exercise and lifestyle modifications    ADFC  DVT PRx Lovenox  NOK  Dispo: Possible discharge tomorrow.            Signed By: Kasia Hernandez MD     September 21, 2022

## 2022-09-21 NOTE — PROGRESS NOTES
1938: Verbal shift change report given to Marlo Nowak RN (oncoming nurse) by Asad Weaver RN (offgoing nurse). Report included the following information SBAR, Kardex, Intake/Output, MAR, Recent Results, Med Rec Status, and Cardiac Rhythm NSR .    2129: Assessment completed at bedside. See flowsheet for details. 2238: Scheduled meds given at this time. 2241: New bag of ordered IVF hung at this time. 2349: Reassessment completed at bedside. See flowsheet for details. 1719: Reassessment completed at bedside. Attempted x2 labs, unsuccessful. Patient stated PICC team had to do labs. Will pass along to oncoming RN. Scheduled Reglan given as ordered. 0802: Left voicemail for vascular access team at this time. 0449: End of Shift Note    Bedside shift change report given to Chance Lozada RN (oncoming nurse) by Marlo Nowak (offgoing nurse). Report included the following information SBAR, Kardex, Intake/Output, MAR, Recent Results, Med Rec Status, and Cardiac Rhythm NSR    Shift worked:  1900-0730     Shift summary and any significant changes:     No changes to report. Concerns for physician to address:  No changes to report. Patient did not complain of pain nor nausea throughout the night. Zone phone for oncoming shift:   [phone]       Activity:  Activity Level: Up ad alexx  Number times ambulated in hallways past shift: 0  Number of times OOB to chair past shift: 0    Cardiac:   Cardiac Monitoring: Yes      Cardiac Rhythm: Sinus Rhythm    Access:  Current line(s): PIV     Genitourinary:   Urinary status: voiding    Respiratory:   O2 Device: None (Room air)  Chronic home O2 use?: YES  Incentive spirometer at bedside: YES       GI:  Last Bowel Movement Date: 09/19/22  Current diet:  ADULT ORAL NUTRITION SUPPLEMENT Breakfast, Dinner; Clear Liquid  ADULT DIET Clear Liquid; 3 carb choices (45 gm/meal); GI Marlow (GERD/Peptic Ulcer);  Apple Ensure clear only please  Passing flatus: YES  Tolerating current diet: NO       Pain Management:   Patient states pain is manageable on current regimen: YES    Skin:  Lemuel Score: 21  Interventions: float heels and increase time out of bed, nutritional support    Patient Safety:  Fall Score:  Total Score: 1  Interventions: gripper socks and pt to call before getting OOB       Length of Stay:  Expected LOS: - - -  Actual LOS: 0      Lana Bowser

## 2022-09-21 NOTE — PROGRESS NOTES
Problem: Falls - Risk of  Goal: *Absence of Falls  Description: Document Green Salvia Fall Risk and appropriate interventions in the flowsheet. Outcome: Progressing Towards Goal  Note: Fall Risk Interventions:            Medication Interventions: Assess postural VS orthostatic hypotension, Evaluate medications/consider consulting pharmacy, Patient to call before getting OOB                   Problem: Diabetes Self-Management  Goal: *Disease process and treatment process  Description: Define diabetes and identify own type of diabetes; list 3 options for treating diabetes. Outcome: Progressing Towards Goal  Goal: *Incorporating nutritional management into lifestyle  Description: Describe effect of type, amount and timing of food on blood glucose; list 3 methods for planning meals. Outcome: Progressing Towards Goal  Goal: *Incorporating physical activity into lifestyle  Description: State effect of exercise on blood glucose levels. Outcome: Progressing Towards Goal  Goal: *Developing strategies to promote health/change behavior  Description: Define the ABC's of diabetes; identify appropriate screenings, schedule and personal plan for screenings. Outcome: Progressing Towards Goal  Goal: *Using medications safely  Description: State effect of diabetes medications on diabetes; name diabetes medication taking, action and side effects. Outcome: Progressing Towards Goal  Goal: *Monitoring blood glucose, interpreting and using results  Description: Identify recommended blood glucose targets  and personal targets. Outcome: Progressing Towards Goal  Goal: *Prevention, detection, treatment of acute complications  Description: List symptoms of hyper- and hypoglycemia; describe how to treat low blood sugar and actions for lowering  high blood glucose level.   Outcome: Progressing Towards Goal  Goal: *Prevention, detection and treatment of chronic complications  Description: Define the natural course of diabetes and describe the relationship of blood glucose levels to long term complications of diabetes.   Outcome: Progressing Towards Goal  Goal: *Developing strategies to address psychosocial issues  Description: Describe feelings about living with diabetes; identify support needed and support network  Outcome: Progressing Towards Goal  Goal: *Insulin pump training  Outcome: Progressing Towards Goal  Goal: *Sick day guidelines  Outcome: Progressing Towards Goal  Goal: *Patient Specific Goal (EDIT GOAL, INSERT TEXT)  Outcome: Progressing Towards Goal

## 2022-09-22 PROBLEM — R11.10 INTRACTABLE VOMITING: Status: ACTIVE | Noted: 2022-09-22

## 2022-09-22 LAB
ANION GAP SERPL CALC-SCNC: 7 MMOL/L (ref 5–15)
BUN SERPL-MCNC: 13 MG/DL (ref 6–20)
BUN/CREAT SERPL: 12 (ref 12–20)
CALCIUM SERPL-MCNC: 8.3 MG/DL (ref 8.5–10.1)
CHLORIDE SERPL-SCNC: 112 MMOL/L (ref 97–108)
CO2 SERPL-SCNC: 24 MMOL/L (ref 21–32)
CREAT SERPL-MCNC: 1.09 MG/DL (ref 0.55–1.02)
GLUCOSE BLD STRIP.AUTO-MCNC: 107 MG/DL (ref 65–117)
GLUCOSE BLD STRIP.AUTO-MCNC: 155 MG/DL (ref 65–117)
GLUCOSE BLD STRIP.AUTO-MCNC: 183 MG/DL (ref 65–117)
GLUCOSE BLD STRIP.AUTO-MCNC: 90 MG/DL (ref 65–117)
GLUCOSE SERPL-MCNC: 84 MG/DL (ref 65–100)
LACTATE SERPL-SCNC: 0.6 MMOL/L (ref 0.4–2)
POTASSIUM SERPL-SCNC: 3 MMOL/L (ref 3.5–5.1)
SERVICE CMNT-IMP: ABNORMAL
SERVICE CMNT-IMP: ABNORMAL
SERVICE CMNT-IMP: NORMAL
SERVICE CMNT-IMP: NORMAL
SODIUM SERPL-SCNC: 143 MMOL/L (ref 136–145)
TSH SERPL DL<=0.05 MIU/L-ACNC: 1.54 UIU/ML (ref 0.36–3.74)

## 2022-09-22 PROCEDURE — 74011250636 HC RX REV CODE- 250/636: Performed by: PHYSICIAN ASSISTANT

## 2022-09-22 PROCEDURE — 94760 N-INVAS EAR/PLS OXIMETRY 1: CPT

## 2022-09-22 PROCEDURE — 74011250637 HC RX REV CODE- 250/637: Performed by: EMERGENCY MEDICINE

## 2022-09-22 PROCEDURE — 74011250636 HC RX REV CODE- 250/636: Performed by: NURSE PRACTITIONER

## 2022-09-22 PROCEDURE — G0378 HOSPITAL OBSERVATION PER HR: HCPCS

## 2022-09-22 PROCEDURE — 36415 COLL VENOUS BLD VENIPUNCTURE: CPT

## 2022-09-22 PROCEDURE — 74011636637 HC RX REV CODE- 636/637: Performed by: STUDENT IN AN ORGANIZED HEALTH CARE EDUCATION/TRAINING PROGRAM

## 2022-09-22 PROCEDURE — 99233 SBSQ HOSP IP/OBS HIGH 50: CPT | Performed by: INTERNAL MEDICINE

## 2022-09-22 PROCEDURE — 74011000250 HC RX REV CODE- 250: Performed by: STUDENT IN AN ORGANIZED HEALTH CARE EDUCATION/TRAINING PROGRAM

## 2022-09-22 PROCEDURE — 74011636637 HC RX REV CODE- 636/637: Performed by: FAMILY MEDICINE

## 2022-09-22 PROCEDURE — 74011000258 HC RX REV CODE- 258: Performed by: SPECIALIST

## 2022-09-22 PROCEDURE — 74011250636 HC RX REV CODE- 250/636: Performed by: FAMILY MEDICINE

## 2022-09-22 PROCEDURE — 84443 ASSAY THYROID STIM HORMONE: CPT

## 2022-09-22 PROCEDURE — 65270000029 HC RM PRIVATE

## 2022-09-22 PROCEDURE — 80048 BASIC METABOLIC PNL TOTAL CA: CPT

## 2022-09-22 PROCEDURE — 74011250637 HC RX REV CODE- 250/637: Performed by: STUDENT IN AN ORGANIZED HEALTH CARE EDUCATION/TRAINING PROGRAM

## 2022-09-22 PROCEDURE — 82962 GLUCOSE BLOOD TEST: CPT

## 2022-09-22 PROCEDURE — 74011250636 HC RX REV CODE- 250/636: Performed by: SPECIALIST

## 2022-09-22 PROCEDURE — 74011250637 HC RX REV CODE- 250/637: Performed by: INTERNAL MEDICINE

## 2022-09-22 PROCEDURE — 83605 ASSAY OF LACTIC ACID: CPT

## 2022-09-22 RX ORDER — POTASSIUM CHLORIDE 7.45 MG/ML
10 INJECTION INTRAVENOUS
Status: COMPLETED | OUTPATIENT
Start: 2022-09-22 | End: 2022-09-22

## 2022-09-22 RX ORDER — POTASSIUM CHLORIDE 7.45 MG/ML
10 INJECTION INTRAVENOUS
Status: DISCONTINUED | OUTPATIENT
Start: 2022-09-22 | End: 2022-09-22 | Stop reason: SDUPTHER

## 2022-09-22 RX ORDER — VALSARTAN 80 MG/1
80 TABLET ORAL 2 TIMES DAILY
Status: DISCONTINUED | OUTPATIENT
Start: 2022-09-22 | End: 2022-09-23 | Stop reason: HOSPADM

## 2022-09-22 RX ORDER — POTASSIUM CHLORIDE 750 MG/1
40 TABLET, FILM COATED, EXTENDED RELEASE ORAL ONCE
Status: COMPLETED | OUTPATIENT
Start: 2022-09-22 | End: 2022-09-22

## 2022-09-22 RX ADMIN — ONDANSETRON 8 MG: 2 INJECTION INTRAMUSCULAR; INTRAVENOUS at 11:39

## 2022-09-22 RX ADMIN — SODIUM CHLORIDE 100 ML/HR: 9 INJECTION, SOLUTION INTRAVENOUS at 19:56

## 2022-09-22 RX ADMIN — POTASSIUM CHLORIDE 10 MEQ: 7.46 INJECTION, SOLUTION INTRAVENOUS at 05:03

## 2022-09-22 RX ADMIN — ACETAMINOPHEN 650 MG: 325 TABLET ORAL at 21:18

## 2022-09-22 RX ADMIN — VALSARTAN 80 MG: 80 TABLET, FILM COATED ORAL at 09:00

## 2022-09-22 RX ADMIN — SODIUM CHLORIDE 100 ML/HR: 9 INJECTION, SOLUTION INTRAVENOUS at 05:09

## 2022-09-22 RX ADMIN — Medication 2 UNITS: at 12:57

## 2022-09-22 RX ADMIN — ERYTHROMYCIN LACTOBIONATE 250 MG: 500 INJECTION, POWDER, LYOPHILIZED, FOR SOLUTION INTRAVENOUS at 21:15

## 2022-09-22 RX ADMIN — POTASSIUM CHLORIDE 10 MEQ: 7.46 INJECTION, SOLUTION INTRAVENOUS at 06:41

## 2022-09-22 RX ADMIN — POTASSIUM CHLORIDE 40 MEQ: 750 TABLET, FILM COATED, EXTENDED RELEASE ORAL at 10:17

## 2022-09-22 RX ADMIN — POTASSIUM CHLORIDE 10 MEQ: 7.46 INJECTION, SOLUTION INTRAVENOUS at 10:16

## 2022-09-22 RX ADMIN — VALSARTAN 80 MG: 80 TABLET, FILM COATED ORAL at 21:18

## 2022-09-22 RX ADMIN — ERYTHROMYCIN LACTOBIONATE 250 MG: 500 INJECTION, POWDER, LYOPHILIZED, FOR SOLUTION INTRAVENOUS at 15:15

## 2022-09-22 RX ADMIN — SODIUM CHLORIDE, PRESERVATIVE FREE 10 ML: 5 INJECTION INTRAVENOUS at 13:58

## 2022-09-22 RX ADMIN — METOCLOPRAMIDE 10 MG: 5 INJECTION, SOLUTION INTRAMUSCULAR; INTRAVENOUS at 21:18

## 2022-09-22 RX ADMIN — SODIUM CHLORIDE, PRESERVATIVE FREE 10 ML: 5 INJECTION INTRAVENOUS at 21:18

## 2022-09-22 RX ADMIN — ACETAMINOPHEN 650 MG: 325 TABLET ORAL at 06:54

## 2022-09-22 RX ADMIN — POTASSIUM CHLORIDE 10 MEQ: 7.46 INJECTION, SOLUTION INTRAVENOUS at 12:48

## 2022-09-22 RX ADMIN — METOCLOPRAMIDE 10 MG: 5 INJECTION, SOLUTION INTRAMUSCULAR; INTRAVENOUS at 15:14

## 2022-09-22 RX ADMIN — SODIUM CHLORIDE, PRESERVATIVE FREE 20 ML: 5 INJECTION INTRAVENOUS at 02:45

## 2022-09-22 RX ADMIN — VORTIOXETINE 10 MG: 10 TABLET, FILM COATED ORAL at 10:45

## 2022-09-22 RX ADMIN — METOCLOPRAMIDE 10 MG: 5 INJECTION, SOLUTION INTRAMUSCULAR; INTRAVENOUS at 05:14

## 2022-09-22 RX ADMIN — AMLODIPINE BESYLATE 10 MG: 5 TABLET ORAL at 10:17

## 2022-09-22 RX ADMIN — PANTOPRAZOLE SODIUM 40 MG: 40 TABLET, DELAYED RELEASE ORAL at 10:17

## 2022-09-22 RX ADMIN — METOCLOPRAMIDE 10 MG: 5 INJECTION, SOLUTION INTRAMUSCULAR; INTRAVENOUS at 10:18

## 2022-09-22 RX ADMIN — PANTOPRAZOLE SODIUM 40 MG: 40 TABLET, DELAYED RELEASE ORAL at 18:34

## 2022-09-22 RX ADMIN — POTASSIUM CHLORIDE 10 MEQ: 7.46 INJECTION, SOLUTION INTRAVENOUS at 11:35

## 2022-09-22 RX ADMIN — INSULIN GLARGINE 60 UNITS: 100 INJECTION, SOLUTION SUBCUTANEOUS at 21:19

## 2022-09-22 RX ADMIN — POTASSIUM CHLORIDE 10 MEQ: 7.46 INJECTION, SOLUTION INTRAVENOUS at 13:57

## 2022-09-22 NOTE — PROGRESS NOTES
Gastroenterology Daily Progress Note   [facility] for Dr. Morena Green)   [address] [place] Date: 9/18/2022     Follow up of diabetic gastroparesis     Subjective:       Patient was seen earlier this AM on rounds and was feeling better and asking for grits for breakfast.  No zofran was given yesterday. No further N/V or abd pain. Lab Results   Component Value Date/Time    Sodium 143 09/22/2022 02:29 AM    Potassium 3.0 (L) 09/22/2022 02:29 AM    Chloride 112 (H) 09/22/2022 02:29 AM    CO2 24 09/22/2022 02:29 AM    Anion gap 7 09/22/2022 02:29 AM    Glucose 84 09/22/2022 02:29 AM    BUN 13 09/22/2022 02:29 AM    Creatinine 1.09 (H) 09/22/2022 02:29 AM    BUN/Creatinine ratio 12 09/22/2022 02:29 AM    GFR est AA >60 09/22/2022 02:29 AM    GFR est non-AA 56 (L) 09/22/2022 02:29 AM    Calcium 8.3 (L) 09/22/2022 02:29 AM    Bilirubin, total 0.5 09/18/2022 05:30 PM    Alk. phosphatase 98 09/18/2022 05:30 PM    Protein, total 8.1 09/18/2022 05:30 PM    Albumin 2.7 (L) 09/21/2022 09:50 AM    Globulin 4.9 (H) 09/18/2022 05:30 PM    A-G Ratio 0.7 (L) 09/18/2022 05:30 PM    ALT (SGPT) 28 09/18/2022 05:30 PM    AST (SGOT) 13 (L) 09/18/2022 05:30 PM         XR Results (most recent):  Results from Hospital Encounter encounter on 09/18/22    XR ABD (KUB)    Narrative  EXAM: XR ABD (KUB)    INDICATION: nausea and vomiting    COMPARISON: 9/18/2022. FINDINGS: A supine radiograph of the abdomen shows a paucity of the bowel gas. No soft tissue masses or pathologic calcifications are identified. The bones and  soft tissues are within normal limits.     Impression  Nonspecific bowel gas pattern    Lab Results   Component Value Date/Time    WBC 15.6 (H) 09/21/2022 09:50 AM    HGB 10.0 (L) 09/21/2022 09:50 AM    HCT 31.0 (L) 09/21/2022 09:50 AM    PLATELET 980 45/52/2551 09:50 AM    MCV 83.8 09/21/2022 09:50 AM       Current Facility-Administered Medications   Medication Dose Route Frequency    potassium chloride 10 mEq in 100 ml IVPB  10 mEq IntraVENous Q1H    potassium chloride SR (KLOR-CON 10) tablet 40 mEq  40 mEq Oral ONCE    valsartan (DIOVAN) tablet 80 mg  80 mg Oral BID    amLODIPine (NORVASC) tablet 10 mg  10 mg Oral DAILY    hydrALAZINE (APRESOLINE) 20 mg/mL injection 10 mg  10 mg IntraVENous Q2H PRN    ondansetron (ZOFRAN ODT) tablet 8 mg  8 mg Oral Q8H PRN    Or    ondansetron (ZOFRAN) injection 8 mg  8 mg IntraVENous Q6H PRN    enoxaparin (LOVENOX) injection 30 mg  30 mg SubCUTAneous BID    acetaminophen (TYLENOL) tablet 650 mg  650 mg Oral Q6H PRN    sodium chloride (NS) flush 5-40 mL  5-40 mL IntraVENous Q8H    sodium chloride (NS) flush 5-40 mL  5-40 mL IntraVENous PRN    acetaminophen (TYLENOL) tablet 650 mg  650 mg Oral Q6H PRN    Or    acetaminophen (TYLENOL) suppository 650 mg  650 mg Rectal Q6H PRN    polyethylene glycol (MIRALAX) packet 17 g  17 g Oral DAILY PRN    pantoprazole (PROTONIX) tablet 40 mg  40 mg Oral BID    vortioxetine (TRINTELLIX) tablet 10 mg  10 mg Oral DAILY    insulin glargine (LANTUS) injection 60 Units  60 Units SubCUTAneous QHS    0.9% sodium chloride infusion  100 mL/hr IntraVENous CONTINUOUS    insulin lispro (HUMALOG) injection   SubCUTAneous AC&HS    glucose chewable tablet 16 g  4 Tablet Oral PRN    glucagon (GLUCAGEN) injection 1 mg  1 mg IntraMUSCular PRN    dextrose 10% infusion 0-250 mL  0-250 mL IntraVENous PRN    metoclopramide HCl (REGLAN) injection 10 mg  10 mg IntraVENous Q6H        Objective:     Visit Vitals  BP (!) 174/76 (BP 1 Location: Left arm, BP Patient Position: At rest)   Pulse 92   Temp 98.2 °F (36.8 °C)   Resp 16   Ht 5' 2\" (1.575 m)   Wt 127.7 kg (281 lb 8.4 oz)   SpO2 100%   BMI 51.49 kg/m²   Blood pressure (!) 174/76, pulse 92, temperature 98.2 °F (36.8 °C), resp. rate 16, height 5' 2\" (1.575 m), weight 127.7 kg (281 lb 8.4 oz), last menstrual period 09/02/2022, SpO2 100 %. No intake/output data recorded.     09/20 1901 - 09/22 0700  In: 5281.7 [P.O.:1260; I.V.:4021.7]  Out: -       Intake/Output Summary (Last 24 hours) at 9/22/2022 0940  Last data filed at 9/22/2022 0711  Gross per 24 hour   Intake 4046.67 ml   Output --   Net 4046.67 ml         Physical Exam:       General: obese white female, NAD  Chest:  CTA, No rhonchi, rales or rubs. Heart: S1, S2, RRR  GI: Soft, NT, ND + bowel sounds  Extremities: no edema   CNS: CN II-XII normal.      Labs:       Recent Results (from the past 24 hour(s))   CBC WITH AUTOMATED DIFF    Collection Time: 09/21/22  9:50 AM   Result Value Ref Range    WBC 15.6 (H) 3.6 - 11.0 K/uL    RBC 3.70 (L) 3.80 - 5.20 M/uL    HGB 10.0 (L) 11.5 - 16.0 g/dL    HCT 31.0 (L) 35.0 - 47.0 %    MCV 83.8 80.0 - 99.0 FL    MCH 27.0 26.0 - 34.0 PG    MCHC 32.3 30.0 - 36.5 g/dL    RDW 14.2 11.5 - 14.5 %    PLATELET 391 524 - 363 K/uL    MPV 8.9 8.9 - 12.9 FL    NRBC 0.0 0  WBC    ABSOLUTE NRBC 0.00 0.00 - 0.01 K/uL    NEUTROPHILS 74 32 - 75 %    LYMPHOCYTES 19 12 - 49 %    MONOCYTES 5 5 - 13 %    EOSINOPHILS 1 0 - 7 %    BASOPHILS 0 0 - 1 %    IMMATURE GRANULOCYTES 1 (H) 0.0 - 0.5 %    ABS. NEUTROPHILS 11.5 (H) 1.8 - 8.0 K/UL    ABS. LYMPHOCYTES 3.0 0.8 - 3.5 K/UL    ABS. MONOCYTES 0.8 0.0 - 1.0 K/UL    ABS. EOSINOPHILS 0.1 0.0 - 0.4 K/UL    ABS. BASOPHILS 0.0 0.0 - 0.1 K/UL    ABS. IMM.  GRANS. 0.2 (H) 0.00 - 0.04 K/UL    DF AUTOMATED     RENAL FUNCTION PANEL    Collection Time: 09/21/22  9:50 AM   Result Value Ref Range    Sodium 141 136 - 145 mmol/L    Potassium 3.7 3.5 - 5.1 mmol/L    Chloride 110 (H) 97 - 108 mmol/L    CO2 24 21 - 32 mmol/L    Anion gap 7 5 - 15 mmol/L    Glucose 203 (H) 65 - 100 mg/dL    BUN 17 6 - 20 MG/DL    Creatinine 1.24 (H) 0.55 - 1.02 MG/DL    BUN/Creatinine ratio 14 12 - 20      GFR est AA 58 (L) >60 ml/min/1.73m2    GFR est non-AA 48 (L) >60 ml/min/1.73m2    Calcium 8.5 8.5 - 10.1 MG/DL    Phosphorus 2.4 (L) 2.6 - 4.7 MG/DL    Albumin 2.7 (L) 3.5 - 5.0 g/dL   MAGNESIUM Collection Time: 09/21/22  9:50 AM   Result Value Ref Range    Magnesium 2.0 1.6 - 2.4 mg/dL   GLUCOSE, POC    Collection Time: 09/21/22 11:12 AM   Result Value Ref Range    Glucose (POC) 336 (H) 65 - 117 mg/dL    Performed by Peyton Kc (PCT)    GLUCOSE, POC    Collection Time: 09/21/22  4:43 PM   Result Value Ref Range    Glucose (POC) 233 (H) 65 - 117 mg/dL    Performed by Peyton Kc (PCT)    GLUCOSE, POC    Collection Time: 09/21/22  9:21 PM   Result Value Ref Range    Glucose (POC) 226 (H) 65 - 117 mg/dL    Performed by Heath Herring (PCT)    TSH 3RD GENERATION    Collection Time: 09/22/22  2:29 AM   Result Value Ref Range    TSH 1.54 0.36 - 2.22 uIU/mL   METABOLIC PANEL, BASIC    Collection Time: 09/22/22  2:29 AM   Result Value Ref Range    Sodium 143 136 - 145 mmol/L    Potassium 3.0 (L) 3.5 - 5.1 mmol/L    Chloride 112 (H) 97 - 108 mmol/L    CO2 24 21 - 32 mmol/L    Anion gap 7 5 - 15 mmol/L    Glucose 84 65 - 100 mg/dL    BUN 13 6 - 20 MG/DL    Creatinine 1.09 (H) 0.55 - 1.02 MG/DL    BUN/Creatinine ratio 12 12 - 20      GFR est AA >60 >60 ml/min/1.73m2    GFR est non-AA 56 (L) >60 ml/min/1.73m2    Calcium 8.3 (L) 8.5 - 10.1 MG/DL   LACTIC ACID    Collection Time: 09/22/22  2:29 AM   Result Value Ref Range    Lactic acid 0.6 0.4 - 2.0 MMOL/L   GLUCOSE, POC    Collection Time: 09/22/22  8:04 AM   Result Value Ref Range    Glucose (POC) 90 65 - 117 mg/dL    Performed by Mikki Harper RN    LABRCNT(wbc:2,hgb:2,hct:2,plt:2,)  Recent Labs     09/22/22  0229 09/21/22  0950 09/20/22  0345    141 140   K 3.0* 3.7 3.9   * 110* 112*   CO2 24 24 21   BUN 13 17 29*   CREA 1.09* 1.24* 1.61*   GLU 84 203* 84   CA 8.3* 8.5 8.5   MG  --  2.0  --    PHOS  --  2.4*  --    LABRCNT(sgot:3,gpt:3,ap:3,tbiL:3,TP:3,ALB:3,GLOB:3,ggt:3,aml:3,amyp:3,lpse:3,hlpse:3)No results for input(s): INR, PTP, APTT, INREXT, INREXT in the last 72 hours.   Recent Labs     09/21/22  0950   ALB 2.7* BRIEFLAB(B12,FOL,FOLAT,RBCF)No results found for: FOL, RBCFLABRCNT(CPK:3,CpKMB:3,ckndx:3,troiq:3)No components found for: GLPOCBRIEFLAB(CHOL,CHOLX,CHOLP,CHLST,CHOLV,HDL,HDLC,HDLP,LDL,DLDL,LDLC,DLDLP,TGL,TGLX,TRIGL,TRIGP,CHHD,CHHDX)No results for input(s): PH, PCO2, PO2 in the last 72 hours. LABRCNT(CPK:3,CpKMB:3,ckndx:3,troiq:3)[facility]  No results for input(s): CPK, CKNDX, TROIQ in the last 72 hours. No lab exists for component: CPKMBMEShGIOVANA [place]      Problem List:     Active Problems:    Intractable nausea and vomiting (9/19/2022)      Intractable vomiting (9/22/2022)      Impression:  Diabetic gastroparesis  Morbid obesity  Poorly controlled diabetes         Plan:  Advance to full liquid diet. Continue reglan 10mg qid and zofran 8mg PRN. Tight glycemic control. BG improved this AM. Diabetic and gastroparesis diet once able to tolerate solids. Would benefit from diabetic education and close follow up with endocrinology as elevated BG/poorly controlled DM is negatively impacting her gastroparesis. Consider gastric bypass surgery in the future for morbid obesity and comorbidities.          GIOVANA Cardoza  9/22/2022  [address] [place] [address], [address] [place] [address]  [phone]  [address] [place] [address] South: [phone]

## 2022-09-22 NOTE — PROGRESS NOTES
Hospitalist Progress Note    NAME: Madie Vazquez   :  1980   MRN:  [id number]     Subjective:   Daily Progress Note: 2022 8:46 AM      Chief complaint: Intractable nausea and vomiting    -Patient seen and examined. She did not tolerate soup yesterday. Wants to continue clear liquid diet.          Current Facility-Administered Medications   Medication Dose Route Frequency    valsartan (DIOVAN) tablet 80 mg  80 mg Oral BID    potassium chloride 10 mEq in 100 ml IVPB  10 mEq IntraVENous Q1H    erythromycin lactobionate (ERYTHROCIN) 250 mg in 0.9% sodium chloride 100 mL IVPB  250 mg IntraVENous Q8H    amLODIPine (NORVASC) tablet 10 mg  10 mg Oral DAILY    hydrALAZINE (APRESOLINE) 20 mg/mL injection 10 mg  10 mg IntraVENous Q2H PRN    ondansetron (ZOFRAN ODT) tablet 8 mg  8 mg Oral Q8H PRN    Or    ondansetron (ZOFRAN) injection 8 mg  8 mg IntraVENous Q6H PRN    enoxaparin (LOVENOX) injection 30 mg  30 mg SubCUTAneous BID    acetaminophen (TYLENOL) tablet 650 mg  650 mg Oral Q6H PRN    sodium chloride (NS) flush 5-40 mL  5-40 mL IntraVENous Q8H    sodium chloride (NS) flush 5-40 mL  5-40 mL IntraVENous PRN    acetaminophen (TYLENOL) tablet 650 mg  650 mg Oral Q6H PRN    Or    acetaminophen (TYLENOL) suppository 650 mg  650 mg Rectal Q6H PRN    polyethylene glycol (MIRALAX) packet 17 g  17 g Oral DAILY PRN    pantoprazole (PROTONIX) tablet 40 mg  40 mg Oral BID    vortioxetine (TRINTELLIX) tablet 10 mg  10 mg Oral DAILY    insulin glargine (LANTUS) injection 60 Units  60 Units SubCUTAneous QHS    0.9% sodium chloride infusion  100 mL/hr IntraVENous CONTINUOUS    insulin lispro (HUMALOG) injection   SubCUTAneous AC&HS    glucose chewable tablet 16 g  4 Tablet Oral PRN    glucagon (GLUCAGEN) injection 1 mg  1 mg IntraMUSCular PRN    dextrose 10% infusion 0-250 mL  0-250 mL IntraVENous PRN    metoclopramide HCl (REGLAN) injection 10 mg  10 mg IntraVENous Q6H          Objective:     Visit Vitals  BP (!) 165/79 (BP 1 Location: Left arm, BP Patient Position: At rest)   Pulse 90   Temp 98.3 °F (36.8 °C)   Resp 18   Ht 5' 2\" (1.575 m)   Wt 127.7 kg (281 lb 8.4 oz)   LMP 2022   SpO2 97%   BMI 51.49 kg/m²    O2 Flow Rate (L/min): 2 l/min O2 Device: None (Room air)    Temp (24hrs), Av.5 °F (36.9 °C), Min:98.2 °F (36.8 °C), Max:99 °F (37.2 °C)        PHYSICAL EXAM:  General obese  Neck Short  CVS RRR  Respiratory symmetric expansion  Abdomen obese, soft  Extremities moves all  Neuro AAOx3  Psych normal affect  Skin no visible rash    Data Review    Recent Results (from the past 24 hour(s))   GLUCOSE, POC    Collection Time: 22  4:43 PM   Result Value Ref Range    Glucose (POC) 233 (H) 65 - 117 mg/dL    Performed by Willem Lozano (PCT)    GLUCOSE, POC    Collection Time: 22  9:21 PM   Result Value Ref Range    Glucose (POC) 226 (H) 65 - 117 mg/dL    Performed by Heath Herring (PCT)    TSH 3RD GENERATION    Collection Time: 22  2:29 AM   Result Value Ref Range    TSH 1.54 0.36 - 7.34 uIU/mL   METABOLIC PANEL, BASIC    Collection Time: 22  2:29 AM   Result Value Ref Range    Sodium 143 136 - 145 mmol/L    Potassium 3.0 (L) 3.5 - 5.1 mmol/L    Chloride 112 (H) 97 - 108 mmol/L    CO2 24 21 - 32 mmol/L    Anion gap 7 5 - 15 mmol/L    Glucose 84 65 - 100 mg/dL    BUN 13 6 - 20 MG/DL    Creatinine 1.09 (H) 0.55 - 1.02 MG/DL    BUN/Creatinine ratio 12 12 - 20      GFR est AA >60 >60 ml/min/1.73m2    GFR est non-AA 56 (L) >60 ml/min/1.73m2    Calcium 8.3 (L) 8.5 - 10.1 MG/DL   LACTIC ACID    Collection Time: 22  2:29 AM   Result Value Ref Range    Lactic acid 0.6 0.4 - 2.0 MMOL/L   GLUCOSE, POC    Collection Time: 22  8:04 AM   Result Value Ref Range    Glucose (POC) 90 65 - 117 mg/dL    Performed by Ling Kirby RN    GLUCOSE, POC    Collection Time: 22 11:49 AM   Result Value Ref Range    Glucose (POC) 155 (H) 65 - 117 mg/dL    Performed by Griselda Lands      No results found for this visit on 09/18/22. All Micro Results       None              Assessment/Plan:   Intractable nausea/vomiting-suspected gastroparesis  Recently had abnormal gastric emptying study at [address] unknown  Continue current supportive treatment  Cont iv regaln   -Symptoms has improved, earlier plan was to discharge today, but she was feeling very nauseous with liquid diet this afternoon  -Discussed with the GI, plan to add erythromycin and do upper GI series  Continue clear liquid diet, advance as tolerated  Follow GI recommendations  Monitor and adjust dose accordingly  CT abd /p neg    Hypertension, uncontrolled, added amlodipine. As well as added ARB as renal function has improved  CLAUDIA POA-likely underlying CKD. Creatinine continues to improve  DMT2-continue PTA meds and adjust accordingly, monitor BG. Endo to see. A1c 8.1   MDD/ALON-continue home PTA meds  Leukocytosis-suspect reactive monitor clinically  DAVID-continue CPAP  Recent rdojkbqbvxgvpkb-igpnks-wb postop care with surgeon after discharge  Morbid obesity-would benefit from weight loss, exercise and lifestyle modifications    ADFC  DVT PRx Lovenox  NOK  Dispo:  Anticipate discharge tomorrow           Signed By: Logan Rowe MD     September 22, 2022

## 2022-09-22 NOTE — PROGRESS NOTES
End of Shift Note    Bedside shift change report given to Tessa POWELL (oncoming nurse) by Petty Lee RN (offgoing nurse). Report included the following information SBAR, Kardex, ED Summary, Intake/Output, MAR, Recent Results, and Cardiac Rhythm sinus tachy    Shift worked:  3942-5542     Shift summary and any significant changes:     Patient c/o HA. Medicate with tylenol with good relief. Patient hypertensive. See MAR. Medicate with hydralazine with good result. Patient ambulating ad alexx to BR voiding QS. Tolerating clear liquid. No c/o nausea. Concerns for physician to address:  See above     Zone phone for oncoming shift:  [phone]       Activity:  Activity Level: Up ad alexx  Number times ambulated in hallways past shift: 0  Number of times OOB to chair past shift: 0    Cardiac:   Cardiac Monitoring: Yes      Cardiac Rhythm: Sinus Tachy    Access:  Current line(s): midline     Genitourinary:   Urinary status: voiding and external catheter    Respiratory:   O2 Device: None (Room air)  Chronic home O2 use?: NO  Incentive spirometer at bedside: NO       GI:  Last Bowel Movement Date: 09/21/22  Current diet:  ADULT ORAL NUTRITION SUPPLEMENT Breakfast, Dinner; Clear Liquid  ADULT DIET Full Liquid; 3 carb choices (45 gm/meal); Apple Ensure clear only please  Passing flatus: YES  Tolerating current diet: YES       Pain Management:   Patient states pain is manageable on current regimen: YES    Skin:  Lemuel Score: 21  Interventions: speciality bed, increase time out of bed, foam dressing, PT/OT consult, and internal/external urinary devices    Patient Safety:  Fall Score:  Total Score: 1  Interventions: bed/chair alarm, assistive device (walker, cane, etc), gripper socks, and pt to call before getting OOB       Length of Stay:  Expected LOS: - - -  Actual LOS: 0      Jolene Nye RN

## 2022-09-22 NOTE — PROGRESS NOTES
End of Shift Note    Bedside shift change report given to ANDRE Fernández (oncoming nurse) by Mika Munroe RN (offgoing nurse). Report included the following information SBAR, Kardex, Intake/Output, and MAR    Shift worked:  6001-5951     Shift summary and any significant changes:     Patient stable through shift, scheduled reglan provided, no complaints of nausea, CLD tolerated. BP continued to elevate through shift, after 1100 BP taken pt stated she takes 50 mg losartan 2 times per day. Notified provider, no order provided. At 1515 /72, notified provider again, amlodipine and PRN hydralazine ordered, both provided at 1820. All scheduled meds, pt edu, and frequent rounding provided. Concerns for physician to address:  Blood pressure     Zone phone for oncoming shift:          Activity:  Activity Level: Up ad alexx  Number times ambulated in hallways past shift: 0  Number of times OOB to chair past shift: 0    Cardiac:   Cardiac Monitoring: Yes      Cardiac Rhythm: Sinus Rhythm    Access:  Current line(s): PIV     Genitourinary:   Urinary status: voiding    Respiratory:   O2 Device: None (Room air)  Chronic home O2 use?: NO  Incentive spirometer at bedside: NO       GI:  Last Bowel Movement Date: 09/21/22  Current diet:  ADULT ORAL NUTRITION SUPPLEMENT Breakfast, Dinner; Clear Liquid  ADULT DIET Clear Liquid; 3 carb choices (45 gm/meal); GI [place] (GERD/Peptic Ulcer); Apple Ensure clear only please  Passing flatus: YES  Tolerating current diet: YES       Pain Management:   Patient states pain is manageable on current regimen: YES    Skin:  Lemuel Score: 20  Interventions: increase time out of bed    Patient Safety:  Fall Score:  Total Score: 1  Interventions: gripper socks and pt to call before getting OOB       Length of Stay:  Expected LOS: - - -  Actual LOS: 0      Mika Munroe RN

## 2022-09-22 NOTE — PROGRESS NOTES
CM aware that pt will d/c on today and will transition home with family. Pt denies additional needs at this time. CM informed that pt transport will arrive to [facility] today within 20 mins. CM completed the needs of the pt at this time.     NEELAM Tijerina, [address] [place] [address]

## 2022-09-22 NOTE — CONSULTS
This is a 44-year-old woman with a history of type 2 diabetes mellitus who I previously followed. I have not seen her since May 2021. Since I saw her last she developed symptoms of gallbladder disease and she underwent cholecystectomy in August.  Since that time she has had symptoms suggestive of gastroparesis. She presented to [facility] with symptoms of gastroparesis following a gastric emptying study. On presentation, she had a glucose of 379, creatinine 1.65, CO2 22, anion gap 8 , AST of 13, ALT 28, A1c 8.1%. Urinalysis 4+ glucose and 2+ ketones with moderate blood. Treated with metoclopramide and Zofran. Regarding her diabetes, the patient has a longstanding history of hyperglycemia poorly controlled. Her baseline regimen has been 100 units of Lantus insulin and 20 to 30 units of Humalog with meals. Has been on GLP's in the past but has not tolerated them. At our last visit her A1c was 10.4%. She tells me that she recently got a Dexcom and that has really helped her and may explain the improved A1c over the last several weeks. However beginning in November 2021, she began developing symptoms that were initially felt to be gallbladder in origin. She underwent cholecystectomy but unfortunately the symptoms have persisted and in fact worsened. .  Tells me today that she has thought perhaps she might have a gluten enteropathy and that may explain her symptoms. Examination  This is an obese woman resting comfortably in bed in no acute distress  Blood pressure 165/79  Pulse 90  Afebrile  97% on room air  Weight 128 kg  BMI 51.5    Recent laboratory data  Glucose 155 (range )    Impression  1. Type 2 diabetes mellitus with improving glucose control currently on 60 units of glargine insulin daily plus correction  2. Gastroparesis preventing p.o. intake on Reglan with erythromycin to be started.   I did advise her that the hyperglycemia was likely exacerbating if not causing the gastroparesis and that focus on glucose control was essential.    Recommendation  1. Regarding her insulin management, the 60 units (0.5 units/kg) appears to be working well in the absence of p.o. intake  2. I anticipate a significant increase in her insulin requirements when she is able to take p.o. which will be delayed because of her upper endoscopy scheduled for tomorrow. 3.  I am happy to see her in follow-up. This will be arranged. 4.  Rest per team    Total time 35 minutes, more than 50% of which was in direct patient care and/or care coordination. Please note that this dictation was completed with TouchPal, the computer voice recognition software. Quite often unanticipated grammatical, syntax, homophones, and other interpretive errors are inadvertently transcribed by the computer software. Please disregard these errors. Please excuse any errors that have escaped final proofreading.

## 2022-09-22 NOTE — PROGRESS NOTES
Problem: Falls - Risk of  Goal: *Absence of Falls  Description: Document Nick Cease Fall Risk and appropriate interventions in the flowsheet. Outcome: Progressing Towards Goal  Note: Fall Risk Interventions:            Medication Interventions: Patient to call before getting OOB, Teach patient to arise slowly                   Problem: Patient Education: Go to Patient Education Activity  Goal: Patient/Family Education  Outcome: Progressing Towards Goal     Problem: Diabetes Self-Management  Goal: *Disease process and treatment process  Description: Define diabetes and identify own type of diabetes; list 3 options for treating diabetes. Outcome: Progressing Towards Goal  Goal: *Using medications safely  Description: State effect of diabetes medications on diabetes; name diabetes medication taking, action and side effects. Outcome: Progressing Towards Goal  Goal: *Monitoring blood glucose, interpreting and using results  Description: Identify recommended blood glucose targets  and personal targets. Outcome: Progressing Towards Goal  Goal: *Prevention, detection, treatment of acute complications  Description: List symptoms of hyper- and hypoglycemia; describe how to treat low blood sugar and actions for lowering  high blood glucose level. Outcome: Progressing Towards Goal  Goal: *Prevention, detection and treatment of chronic complications  Description: Define the natural course of diabetes and describe the relationship of blood glucose levels to long term complications of diabetes.   Outcome: Progressing Towards Goal

## 2022-09-22 NOTE — PROGRESS NOTES
Received notification from bedside RN about patient with regards to: persistent tachycardia  VS: /78, , RR 19, O2 sat 100% on RA    Intervention given: CBC, BMP, TSH, lactic ordered    0357: Notified of K+ 3.0    - Kcl 10 meq x 6 doses, BMP and Phos for tomorrow AM ordered

## 2022-09-23 ENCOUNTER — HOSPITAL ENCOUNTER (INPATIENT)
Dept: GENERAL RADIOLOGY | Age: 42
Discharge: HOME | DRG: 074 | End: 2022-09-23
Attending: SPECIALIST
Payer: COMMERCIAL

## 2022-09-23 VITALS
WEIGHT: 281.53 LBS | TEMPERATURE: 98.3 F | DIASTOLIC BLOOD PRESSURE: 89 MMHG | HEIGHT: 62 IN | SYSTOLIC BLOOD PRESSURE: 151 MMHG | OXYGEN SATURATION: 98 % | BODY MASS INDEX: 51.81 KG/M2 | HEART RATE: 93 BPM | RESPIRATION RATE: 16 BRPM

## 2022-09-23 LAB
ANION GAP SERPL CALC-SCNC: 6 MMOL/L (ref 5–15)
BUN SERPL-MCNC: 8 MG/DL (ref 6–20)
BUN/CREAT SERPL: 7 (ref 12–20)
CALCIUM SERPL-MCNC: 8.5 MG/DL (ref 8.5–10.1)
CHLORIDE SERPL-SCNC: 114 MMOL/L (ref 97–108)
CO2 SERPL-SCNC: 23 MMOL/L (ref 21–32)
CREAT SERPL-MCNC: 1.14 MG/DL (ref 0.55–1.02)
GLUCOSE BLD STRIP.AUTO-MCNC: 59 MG/DL (ref 65–117)
GLUCOSE BLD STRIP.AUTO-MCNC: 74 MG/DL (ref 65–117)
GLUCOSE BLD STRIP.AUTO-MCNC: 74 MG/DL (ref 65–117)
GLUCOSE BLD STRIP.AUTO-MCNC: 95 MG/DL (ref 65–117)
GLUCOSE SERPL-MCNC: 76 MG/DL (ref 65–100)
PHOSPHATE SERPL-MCNC: 3.6 MG/DL (ref 2.6–4.7)
POTASSIUM SERPL-SCNC: 3.6 MMOL/L (ref 3.5–5.1)
SERVICE CMNT-IMP: ABNORMAL
SERVICE CMNT-IMP: NORMAL
SODIUM SERPL-SCNC: 143 MMOL/L (ref 136–145)

## 2022-09-23 PROCEDURE — 74011250637 HC RX REV CODE- 250/637: Performed by: INTERNAL MEDICINE

## 2022-09-23 PROCEDURE — 74011000258 HC RX REV CODE- 258: Performed by: SPECIALIST

## 2022-09-23 PROCEDURE — 82962 GLUCOSE BLOOD TEST: CPT

## 2022-09-23 PROCEDURE — 84100 ASSAY OF PHOSPHORUS: CPT

## 2022-09-23 PROCEDURE — 74011250637 HC RX REV CODE- 250/637: Performed by: STUDENT IN AN ORGANIZED HEALTH CARE EDUCATION/TRAINING PROGRAM

## 2022-09-23 PROCEDURE — 94760 N-INVAS EAR/PLS OXIMETRY 1: CPT

## 2022-09-23 PROCEDURE — 74011000250 HC RX REV CODE- 250: Performed by: STUDENT IN AN ORGANIZED HEALTH CARE EDUCATION/TRAINING PROGRAM

## 2022-09-23 PROCEDURE — 74246 X-RAY XM UPR GI TRC 2CNTRST: CPT

## 2022-09-23 PROCEDURE — 36415 COLL VENOUS BLD VENIPUNCTURE: CPT

## 2022-09-23 PROCEDURE — 80048 BASIC METABOLIC PNL TOTAL CA: CPT

## 2022-09-23 PROCEDURE — 74011250636 HC RX REV CODE- 250/636: Performed by: SPECIALIST

## 2022-09-23 RX ORDER — ERYTHROMYCIN 250 MG/1
250 TABLET, COATED ORAL 3 TIMES DAILY
Qty: 42 TABLET | Refills: 0 | Status: SHIPPED | OUTPATIENT
Start: 2022-09-23 | End: 2022-10-07

## 2022-09-23 RX ORDER — METOCLOPRAMIDE 10 MG/1
10 TABLET ORAL
Qty: 40 TABLET | Refills: 1 | Status: SHIPPED | OUTPATIENT
Start: 2022-09-23 | End: 2022-10-20

## 2022-09-23 RX ORDER — INSULIN GLARGINE 100 [IU]/ML
INJECTION, SOLUTION SUBCUTANEOUS
Qty: 30 ML | Refills: 10 | Status: SHIPPED | OUTPATIENT
Start: 2022-09-23

## 2022-09-23 RX ORDER — AMLODIPINE BESYLATE 10 MG/1
5 TABLET ORAL DAILY
Qty: 15 TABLET | Refills: 0 | Status: SHIPPED | OUTPATIENT
Start: 2022-09-24 | End: 2022-10-24

## 2022-09-23 RX ADMIN — ERYTHROMYCIN LACTOBIONATE 250 MG: 500 INJECTION, POWDER, LYOPHILIZED, FOR SOLUTION INTRAVENOUS at 06:15

## 2022-09-23 RX ADMIN — VALSARTAN 80 MG: 80 TABLET, FILM COATED ORAL at 10:56

## 2022-09-23 RX ADMIN — METOCLOPRAMIDE 10 MG: 5 INJECTION, SOLUTION INTRAMUSCULAR; INTRAVENOUS at 03:11

## 2022-09-23 RX ADMIN — METOCLOPRAMIDE 10 MG: 5 INJECTION, SOLUTION INTRAMUSCULAR; INTRAVENOUS at 10:03

## 2022-09-23 RX ADMIN — SODIUM CHLORIDE, PRESERVATIVE FREE 10 ML: 5 INJECTION INTRAVENOUS at 06:15

## 2022-09-23 RX ADMIN — VORTIOXETINE 10 MG: 10 TABLET, FILM COATED ORAL at 10:56

## 2022-09-23 RX ADMIN — PANTOPRAZOLE SODIUM 40 MG: 40 TABLET, DELAYED RELEASE ORAL at 10:02

## 2022-09-23 RX ADMIN — AMLODIPINE BESYLATE 10 MG: 5 TABLET ORAL at 10:02

## 2022-09-23 NOTE — DISCHARGE SUMMARY
Hospitalist Discharge Summary     Patient ID:  Anita Barrett  [id number]  67 y.o.  1980 9/18/2022    PCP on record: Ade Javier MD    Admit date: 9/18/2022  Discharge date and time: 9/23/2022    DISCHARGE DIAGNOSIS:    Intractable nausea/vomiting-suspected gastroparesis  CLAUDIA POA  DMT2  MDD/ALON  Leukocytosis  DAVID-  Recent cholecystectomy-  Morbid obesity-    CONSULTATIONS:  IP CONSULT TO ENDOCRINOLOGY  IP CONSULT TO GASTROENTEROLOGY  IP CONSULT TO ENDOCRINOLOGY    Excerpted HPI from H&P of Sandra Nunez MD:  Anita Barrett is a 38 y.o.  female with pertinent past medical history of obesity class III (BMI 51.49), insulin-dependent type 2 diabetes with very high insulin demand (greater than 100 units/day), essential hypertension, asthma, MDD/ALON, biliary dyskinesia status post lap shayan 8/2022, DAVID on CPAP who presents with complaints of nausea, vomiting, and epigastric abdominal pain for the past 2 days. Patient reports symptoms of flared since undergoing gastric emptying study at [facility] this past Friday (9/16). Unfortunately, she has not yet received results from study, but denies prior history of gastroparesis. She reports pain is similar to that she experienced with biliary dyskinesia. She notes pain is precipitated by p.o. intake and minimally relieved with vomiting. She denies any other associated symptoms including chest pain/palpitations, fever/chills, SOB/WALLIS. She is followed by [facility] in outpatient setting. She reports her last A1c was greater than 10 performed by her PCP. She is established with a endocrinologist, Dr. Felton Lion, reports it has been quite a while since she was seen. She does note she thinks her blood sugars have been doing better lately since she has been using a Dexcom monitor estimating that her average blood sugar is around 200-250.      In the ED, patient afebrile and hemodynamically stable saturating upper 90s on room air. Noted to have multiple episodes of retching and emesis and was administered Zofran, droperidol, and metoclopramide. Of note, metoclopramide is reported to have ultimately provided relief. Unfortunately, patient was unable to tolerate p.o. intake prompting request for admission. CT abdomen pelvis performed and demonstrated no acute abdominal or pelvic process. Labs demonstrate: WBC 19.0, hemoglobin 11.0, platelets 994, UA not reflexed to culture (protein, glucose, ketones, moderate blood, many epithelial cells). Sodium 135, potassium 4.5, glucose 361, BUN 35, creatinine 1.89 (baseline 1.6-1.7), lipase 133.       ______________________________________________________________________  DISCHARGE SUMMARY/HOSPITAL COURSE:  for full details see H&P, daily progress notes, labs, consult notes. Intractable nausea/vomiting-suspected gastroparesis  Recently had abnormal gastric emptying study at [address] [place] [address] Dr st unknown  Continue current supportive treatment  CT abd /p neg  Cont iv regaln   -Symptoms has improved, able to tolerate liquid diet  Upper Gi series WNL  Erythromycin was added, which improved her symptoms some        CLAUDIA POA-likely underlying CKD. Creatinine continues to improve  DMT2-continue PTA meds and adjust accordingly, monitor BG. Endo to see.  A1c 8.1   MDD/ALON-continue home PTA meds  Leukocytosis-suspect reactive monitor clinically  DAVID-continue CPAP  Recent igqclyrprqhahui-peygqv-aj postop care with surgeon after discharge  Morbid obesity-would benefit from weight loss, exercise and lifestyle modifications    GI recommendations:  --> would continue reglan 10 mg TID at discharge along with prn zofran  --> IV Emycin added yesterday (continue for 14 days post discharge  --> appreciate endocrine input given her labile BG levels are negatively impacting her gastric emptying  --> discussed that if she may consider a consultation with a bariatric surgeon after discharge to see if gastric bypass would be offered; She should see Dr. Benny Martin or associate at [facility] to discuss this option  --> would advance diet and discharge soon    _______________________________________________________________________  Patient seen and examined by me on discharge day. Pertinent Findings:  Gen:    Not in distress  Chest: Clear lungs  CVS:   Regular rhythm. No edema  Abd:  Soft, not distended, not tender  Neuro:  Alert,   _______________________________________________________________________  DISCHARGE MEDICATIONS:   Discharge Medication List as of 9/23/2022 12:36 PM        START taking these medications    Details   amLODIPine (NORVASC) 10 mg tablet Take 0.5 Tablets by mouth daily for 30 days. , Normal, Disp-15 Tablet, R-0      erythromycin base (E-MYCIN) 250 mg tablet Take 1 Tablet by mouth three (3) times daily for 14 days. , Normal, Disp-42 Tablet, R-0      metoclopramide HCl (Reglan) 10 mg tablet Take 1 Tablet by mouth Before breakfast, lunch, and dinner for 27 days. , Normal, Disp-40 Tablet, R-1           CONTINUE these medications which have CHANGED    Details   insulin glargine (Lantus Solostar U-100 Insulin) 100 unit/mL (3 mL) inpn INJECT 60 UNITS SUBCUTANEOUSLY ROUTE DAILY, Normal, Disp-30 mL, R-10           CONTINUE these medications which have NOT CHANGED    Details   Dexcom G6  misc See Admin Instructions. , Historical Med, TRUDI      Trintellix 10 mg tablet Take 10 mg by mouth daily. , Historical Med, TRUDI      pantoprazole (PROTONIX) 40 mg tablet Take 40 mg by mouth two (2) times a day., Historical Med      glucose blood VI test strips (OneTouch Verio test strips) strip Test blood sugars 3 times daily, Normal, Disp-300 Strip, R-3      insulin lispro (HumaLOG KwikPen Insulin) 200 unit/mL (3 mL) inpn INJECT 20-40 UNITS THREE TIMES A DAY BEFORE MEALS, Normal, Disp-90 mL, R-3      losartan (COZAAR) 50 mg tablet Take 1 Tablet by mouth two (2) times a day., Normal, Disp-180 Tablet, R-3Correction to prior order. Take twice daily. !! Insulin Needles, Disposable, (Pamella Pen Needle) 32 gauge x 5/32\" ndle For insulin up to 5 times daily, Normal, Disp-200 Pen Needle, R-3      Blood-Glucose Meter misc by Does Not Apply route. Checks Blood sugar once a month, Historical Med      !! Insulin Needles, Disposable, (PAMELLA PEN NEEDLE) 32 gauge x 5/32\" ndle 4 times daily, Normal, Disp-150 Pen Needle, R-11      Insulin Syringe-Needle U-100 (BD INSULIN SYRINGE ULT-FINE II) 1 mL 31 x 5/16\" Syrg 1 Each by Does Not Apply route three (3) times daily. , Normal, Disp-100 Syringe, R-10      Lancets (ONE TOUCH DELICA) Misc by Does Not Apply route three (3) times daily. Normal, Disp-100 Each, R-11       !! - Potential duplicate medications found. Please discuss with provider. Patient Follow Up Instructions:    Activity: Activity as tolerated  Diet: Clear liquids, advance as tolerated  Wound Care: None needed        Follow-up Information       Follow up With Specialties Details Why Contact Info    Ade Javier MD Family Medicine Schedule an appointment as soon as possible for a visit in 1 week(s)  [address] [place] [address]  [phone]      Amalia Carolina MD Endocrinology Physician Schedule an appointment as soon as possible for a visit in 2 week(s)  [address]  [phone]            ________________________________________________________________    Risk of deterioration: Low    Condition at Discharge:  Stable  __________________________________________________________________    Disposition  Home with family, no needs    ____________________________________________________________________    Code Status: Full Code  ___________________________________________________________________      Total time in minutes spent coordinating this discharge (includes going over instructions, follow-up, prescriptions, and preparing report for sign off to her PCP) :  >30 minutes    Signed:  Jose F Munson MD

## 2022-09-23 NOTE — PROGRESS NOTES
Pt seen and examined independently. Agree with assessment and plan per GIOVANA Stock.'  Patient had rough after noon yesterday but better overnight. No further vomiting since cream of wheat. F/U for gastroparesis due to DM. For UGIS this am.  PE:  Gen: awake and alert WF in NAD  Abd: soft, NT/ND + BS    UGIS: NORMAL    A/P:  N/V secondary to diabetes gastroparesis  Morbid obesity     --> would continue reglan 10 mg TID at discharge along with prn zofran  --> IV Emycin added yesterday (continue for 14 days post discharge  --> appreciate endocrine input given her labile BG levels are negatively impacting her gastric emptying  --> discussed that if she may consider a consultation with a bariatric surgeon after discharge to see if gastric bypass would be offered;  She should see Dr. Jon Peter or associate at [facility] [place] to discuss this option  --> would advance diet and discharge soon

## 2022-09-23 NOTE — PROGRESS NOTES
I have reviewed discharge instructions with the patient. The patient verbalized understanding. IV removed. Belongings w patient. Work note provided.

## 2022-09-23 NOTE — PROGRESS NOTES
End of Shift Note    Bedside shift change report given to Fly (oncoming nurse) by Rolando Chairez RN (offgoing nurse). Report included the following information SBAR, Kardex, and MAR    Shift worked:  7a-7p     Shift summary and any significant changes: All scheduled meds given and caring rounding completed. Patient ate small amount of solid food for breakfast and tolerated well. Patient had episode of nausea at 1130 treated with PRN antiemetic, see MAR. Patient's family at bedside throughout shift. No complaints of pain throughout shift. Patient is up ad alexx. Concerns for physician to address:       [facility]          Activity:  Activity Level: Up ad alexx  Number times ambulated in hallways past shift: 0  Number of times OOB to chair past shift: 1    Cardiac:   Cardiac Monitoring: Yes      Cardiac Rhythm: Sinus Tachy    Access:  Current line(s): PIV     Genitourinary:   Urinary status: voiding    Respiratory:   O2 Device: None (Room air)  Chronic home O2 use?: NO  Incentive spirometer at bedside: NO       GI:  Last Bowel Movement Date: 09/21/22  Current diet:  ADULT ORAL NUTRITION SUPPLEMENT Breakfast, Dinner; Clear Liquid  DIET NPO  Passing flatus: YES  Tolerating current diet: NO       Pain Management:   Patient states pain is manageable on current regimen: YES    Skin:  Lemuel Score: 19  Interventions: increase time out of bed and nutritional support     Patient Safety:  Fall Score:  Total Score: 1  Interventions: gripper socks       Length of Stay:  Expected LOS: 2d 21h  Actual LOS: 0      Rolando Chairez RN

## 2022-09-23 NOTE — DISCHARGE INSTRUCTIONS
HOSPITALIST DISCHARGE INSTRUCTIONS    NAME: Ann Keene   :  1980   MRN:  [id number]     Date/Time:  2022 11:58 AM    ADMIT DATE: 2022   DISCHARGE DATE: 2022     ntractable nausea/vomiting-suspected gastroparesis  CLAUDIA POA  DMT2  MDD/ALON  Leukocytosis  DAVID-  Recent cholecystectomy-  Morbid obesity-    It is important that you take the medication exactly as they are prescribed. Keep your medication in the bottles provided by the pharmacist and keep a list of the medication names, dosages, and times to be taken in your wallet. Do not take other medications without consulting your doctor. What to do at Home    Recommended diet:  Clear liquids, advance as tolerated    Recommended activity: Activity as tolerated    For now, decrease Lantus to 60 units daily, can increase back to 100 units once your diet return to normal.      If you have questions regarding the hospital related prescriptions or hospital related issues please call [address] [place] [address]' office at [phone]. You can always direct your questions to your primary care doctor if you are unable to reach your hospital physician; your PCP works as an extension of your hospital doctor just like your hospital doctor is an extension of your PCP for your time at the hospital [facility])    If you experience any of the following symptoms then please call your primary care physician or return to the emergency room if you cannot get hold of your doctor:    Fever, chills, nausea, vomiting, or persistent diarrhea  Worsening weakness or new problems with your speech or balance  Dark stools or visible blood in your stools  New Leg swelling or shortness of breath as these could be signs of a clot    Additional Instructions:      Bring these papers with you to your follow up appointments. The papers will help your doctors be sure to continue the care plan from the hospital.              Information obtained by :   I understand that if any problems occur once I am at home I am to contact my physician. I understand and acknowledge receipt of the instructions indicated above.                                                                                                                                            Physician's or R.N.'s Signature                                                                  Date/Time                                                                                                                                              Patient or Representative Signature

## 2022-09-23 NOTE — PROGRESS NOTES
Gastroenterology Daily Progress Note   GIOVANA Galvan for Dr. Leona Guevara)   [address] [place] Date: 9/18/2022     Follow up of diabetic gastroparesis     Subjective:       Patient was seen earlier this AM on rounds and was feeling better. Was NPO for UGI series. States yesterday got nauseous after drinking apple flavored Ensure clear. Never vomited  Added IV Emycin    XR Results (most recent):  Results from Hospital Encounter encounter on 09/18/22    XR UPPER GI W KUB AIR CONT    Narrative  INDICATION: Gastroparesis. Double contrast upper GI series:    Direct comparison is made to prior CT dated September 18, 2022. Preliminary plain radiograph of the abdomen is performed. No dilated loop of  large or small bowel is seen. There is a nonspecific bowel gas pattern. No  pathologic calcification is visualized. The osseous structures are intact. Double-contrast upper GI series is performed after the oral administration of  bicarbonate crystals and thick barium contrast. There is prompt stripping of  contrast and the esophageal mucosa is normal in appearance. There is no  stricture, mass or hiatal hernia. The patient swallowed 1.3 cm barium tablet  passed easily into the stomach. No gastroesophageal reflux is demonstrated. Gastric mucosa is normal. Duodenal bulb and C-sweep are normal. There is prompt  emptying of the liquid barium from the stomach. FLUOROSCOPY DOSE (air kerma): 105.3 mGy    Impression  Normal double contrast upper GI series.           Lab Results   Component Value Date/Time    Sodium 143 09/23/2022 05:55 AM    Potassium 3.6 09/23/2022 05:55 AM    Chloride 114 (H) 09/23/2022 05:55 AM    CO2 23 09/23/2022 05:55 AM    Anion gap 6 09/23/2022 05:55 AM    Glucose 76 09/23/2022 05:55 AM    BUN 8 09/23/2022 05:55 AM    Creatinine 1.14 (H) 09/23/2022 05:55 AM    BUN/Creatinine ratio 7 (L) 09/23/2022 05:55 AM    GFR est AA >60 09/23/2022 05:55 AM    GFR est non-AA 53 (L) 09/23/2022 05:55 AM    Calcium 8.5 09/23/2022 05:55 AM    Bilirubin, total 0.5 09/18/2022 05:30 PM    Alk.  phosphatase 98 09/18/2022 05:30 PM    Protein, total 8.1 09/18/2022 05:30 PM    Albumin 2.7 (L) 09/21/2022 09:50 AM    Globulin 4.9 (H) 09/18/2022 05:30 PM    A-G Ratio 0.7 (L) 09/18/2022 05:30 PM    ALT (SGPT) 28 09/18/2022 05:30 PM    AST (SGOT) 13 (L) 09/18/2022 05:30 PM         Lab Results   Component Value Date/Time    WBC 15.6 (H) 09/21/2022 09:50 AM    HGB 10.0 (L) 09/21/2022 09:50 AM    HCT 31.0 (L) 09/21/2022 09:50 AM    PLATELET 319 87/46/3504 09:50 AM    MCV 83.8 09/21/2022 09:50 AM       Current Facility-Administered Medications   Medication Dose Route Frequency    valsartan (DIOVAN) tablet 80 mg  80 mg Oral BID    erythromycin lactobionate (ERYTHROCIN) 250 mg in 0.9% sodium chloride 100 mL IVPB  250 mg IntraVENous Q8H    amLODIPine (NORVASC) tablet 10 mg  10 mg Oral DAILY    hydrALAZINE (APRESOLINE) 20 mg/mL injection 10 mg  10 mg IntraVENous Q2H PRN    ondansetron (ZOFRAN ODT) tablet 8 mg  8 mg Oral Q8H PRN    Or    ondansetron (ZOFRAN) injection 8 mg  8 mg IntraVENous Q6H PRN    enoxaparin (LOVENOX) injection 30 mg  30 mg SubCUTAneous BID    acetaminophen (TYLENOL) tablet 650 mg  650 mg Oral Q6H PRN    sodium chloride (NS) flush 5-40 mL  5-40 mL IntraVENous Q8H    sodium chloride (NS) flush 5-40 mL  5-40 mL IntraVENous PRN    acetaminophen (TYLENOL) tablet 650 mg  650 mg Oral Q6H PRN    Or    acetaminophen (TYLENOL) suppository 650 mg  650 mg Rectal Q6H PRN    polyethylene glycol (MIRALAX) packet 17 g  17 g Oral DAILY PRN    pantoprazole (PROTONIX) tablet 40 mg  40 mg Oral BID    vortioxetine (TRINTELLIX) tablet 10 mg  10 mg Oral DAILY    insulin glargine (LANTUS) injection 60 Units  60 Units SubCUTAneous QHS    0.9% sodium chloride infusion  100 mL/hr IntraVENous CONTINUOUS    insulin lispro (HUMALOG) injection   SubCUTAneous AC&HS    glucose chewable tablet 16 g  4 Tablet Oral PRN glucagon (GLUCAGEN) injection 1 mg  1 mg IntraMUSCular PRN    dextrose 10% infusion 0-250 mL  0-250 mL IntraVENous PRN    metoclopramide HCl (REGLAN) injection 10 mg  10 mg IntraVENous Q6H        Objective:     Visit Vitals  BP (!) 151/89   Pulse 93   Temp 98.3 °F (36.8 °C)   Resp 16   Ht 5' 2\" (1.575 m)   Wt 127.7 kg (281 lb 8.4 oz)   SpO2 98%   BMI 51.49 kg/m²   Blood pressure (!) 151/89, pulse 93, temperature 98.3 °F (36.8 °C), resp. rate 16, height 5' 2\" (1.575 m), weight 127.7 kg (281 lb 8.4 oz), last menstrual period 09/02/2022, SpO2 98 %. No intake/output data recorded. 09/21 1901 - 09/23 0700  In: 4080 [P.O.:900; I.V.:3180]  Out: -       Intake/Output Summary (Last 24 hours) at 9/23/2022 1258  Last data filed at 9/23/2022 7727  Gross per 24 hour   Intake 1300 ml   Output --   Net 1300 ml         Physical Exam:       General: obese white female, NAD  Chest:  CTA, No rhonchi, rales or rubs.   Heart: S1, S2, RRR  GI: Soft, NT, ND + bowel sounds  Extremities: no edema   CNS: CN II-XII normal.      Labs:       Recent Results (from the past 24 hour(s))   GLUCOSE, POC    Collection Time: 09/22/22  4:55 PM   Result Value Ref Range    Glucose (POC) 107 65 - 117 mg/dL    Performed by Irene Fletcher PCT    GLUCOSE, POC    Collection Time: 09/22/22  9:14 PM   Result Value Ref Range    Glucose (POC) 183 (H) 65 - 117 mg/dL    Performed by Palomares Brisk*    METABOLIC PANEL, BASIC    Collection Time: 09/23/22  5:55 AM   Result Value Ref Range    Sodium 143 136 - 145 mmol/L    Potassium 3.6 3.5 - 5.1 mmol/L    Chloride 114 (H) 97 - 108 mmol/L    CO2 23 21 - 32 mmol/L    Anion gap 6 5 - 15 mmol/L    Glucose 76 65 - 100 mg/dL    BUN 8 6 - 20 MG/DL    Creatinine 1.14 (H) 0.55 - 1.02 MG/DL    BUN/Creatinine ratio 7 (L) 12 - 20      GFR est AA >60 >60 ml/min/1.73m2    GFR est non-AA 53 (L) >60 ml/min/1.73m2    Calcium 8.5 8.5 - 10.1 MG/DL   PHOSPHORUS    Collection Time: 09/23/22  5:55 AM   Result Value Ref Range Phosphorus 3.6 2.6 - 4.7 MG/DL   GLUCOSE, POC    Collection Time: 09/23/22  9:48 AM   Result Value Ref Range    Glucose (POC) 74 65 - 117 mg/dL    Performed by Deepak Lal    GLUCOSE, POC    Collection Time: 09/23/22 12:00 PM   Result Value Ref Range    Glucose (POC) 59 (L) 65 - 117 mg/dL    Performed by Tammi Anand    GLUCOSE, POC    Collection Time: 09/23/22 12:04 PM   Result Value Ref Range    Glucose (POC) 74 65 - 117 mg/dL    Performed by [address], POC    Collection Time: 09/23/22 12:24 PM   Result Value Ref Range    Glucose (POC) 95 65 - 117 mg/dL    Performed by Tammi Anand    LABRCNT(wbc:2,hgb:2,hct:2,plt:2,)  Recent Labs     09/23/22  0555 09/22/22  0229 09/21/22  0950    143 141   K 3.6 3.0* 3.7   * 112* 110*   CO2 23 24 24   BUN 8 13 17   CREA 1.14* 1.09* 1.24*   GLU 76 84 203*   CA 8.5 8.3* 8.5   MG  --   --  2.0   PHOS 3.6  --  2.4*   LABRCNT(sgot:3,gpt:3,ap:3,tbiL:3,TP:3,ALB:3,GLOB:3,ggt:3,aml:3,amyp:3,lpse:3,hlpse:3)No results for input(s): INR, PTP, APTT, INREXT, INREXT in the last 72 hours. Recent Labs     09/21/22  0950   ALB 2.7*   BRIEFLAB(B12,FOL,FOLAT,RBCF)No results found for: FOL, RBCFLABRCNT(CPK:3,CpKMB:3,ckndx:3,troiq:3)No components found for: GLPOCBRIEFLAB(CHOL,CHOLX,CHOLP,CHLST,CHOLV,HDL,HDLC,HDLP,LDL,DLDL,LDLC,DLDLP,TGL,TGLX,TRIGL,TRIGP,CHHD,CHHDX)No results for input(s): PH, PCO2, PO2 in the last 72 hours. LABRCNT(CPK:3,CpKMB:3,ckndx:3,troiq:3)GIOVANA Plunkett  No results for input(s): CPK, CKNDX, TROIQ in the last 72 hours. No lab exists for component: GIOVANA Gonzalez      Problem List:     Active Problems:    Intractable nausea and vomiting (9/19/2022)      Intractable vomiting (9/22/2022)      Impression:  Diabetic gastroparesis  Morbid obesity  Poorly controlled diabetes         Plan:  UGI normal.  Advance to regular bland, low fat diabetic diet. Continue reglan 10mg TID plus Zofran PRN at discharge.   IV Emycin added yesterday, continue for 2 weeks post discharge. Appreciate endocrine's input given that her poorly controlled diabetes is negatively impacting her gastric emptying. Consider gastric bypass surgery in the future for morbid obesity and comorbidities. Should see Dr. Jon Peter or associate as outpatient. Will have call partners see on request over the weekend. Hopefully can d/c soon.         GIOVANA Méndez  9/23/2022 [address] [facility], [address] [place] [address]  [phone] [id number] [address] [address] [place]: [phone]

## 2022-09-23 NOTE — PROGRESS NOTES
Problem: Falls - Risk of  Goal: *Absence of Falls  Description: Document Leafy Schwartz Fall Risk and appropriate interventions in the flowsheet. Outcome: Resolved/Met     Problem: Patient Education: Go to Patient Education Activity  Goal: Patient/Family Education  Outcome: Resolved/Met     Problem: Diabetes Self-Management  Goal: *Disease process and treatment process  Description: Define diabetes and identify own type of diabetes; list 3 options for treating diabetes. Outcome: Resolved/Met  Goal: *Incorporating nutritional management into lifestyle  Description: Describe effect of type, amount and timing of food on blood glucose; list 3 methods for planning meals. Outcome: Resolved/Met  Goal: *Incorporating physical activity into lifestyle  Description: State effect of exercise on blood glucose levels. Outcome: Resolved/Met  Goal: *Developing strategies to promote health/change behavior  Description: Define the ABC's of diabetes; identify appropriate screenings, schedule and personal plan for screenings. Outcome: Resolved/Met  Goal: *Using medications safely  Description: State effect of diabetes medications on diabetes; name diabetes medication taking, action and side effects. Outcome: Resolved/Met  Goal: *Monitoring blood glucose, interpreting and using results  Description: Identify recommended blood glucose targets  and personal targets. Outcome: Resolved/Met  Goal: *Prevention, detection, treatment of acute complications  Description: List symptoms of hyper- and hypoglycemia; describe how to treat low blood sugar and actions for lowering  high blood glucose level. Outcome: Resolved/Met  Goal: *Prevention, detection and treatment of chronic complications  Description: Define the natural course of diabetes and describe the relationship of blood glucose levels to long term complications of diabetes.   Outcome: Resolved/Met  Goal: *Developing strategies to address psychosocial issues  Description: Describe feelings about living with diabetes; identify support needed and support network  Outcome: Resolved/Met  Goal: *Insulin pump training  Outcome: Resolved/Met  Goal: *Sick day guidelines  Outcome: Resolved/Met  Goal: *Patient Specific Goal (EDIT GOAL, INSERT TEXT)  Outcome: Resolved/Met     Problem: Patient Education: Go to Patient Education Activity  Goal: Patient/Family Education  Outcome: Resolved/Met

## 2022-09-23 NOTE — PROGRESS NOTES
.  [address] [facility]. September 23, 2022       RE: Sana Swan      To Whom It May Concern,    This is to certify that Sana Swan  may return to work on Sep 26,2022    Please feel free to contact my office if you have any questions or concerns. Thank you for your assistance in this matter.       Sincerely,  Renato Grimaldo MD

## 2022-10-13 ENCOUNTER — TELEPHONE (OUTPATIENT)
Dept: ENDOCRINOLOGY | Age: 42
End: 2022-10-13

## 2022-10-13 NOTE — TELEPHONE ENCOUNTER
10/13/2022  2:22 PM      Pt called and made an appointment but its not til next year. Pt stated Edna Hoffmann may want to see her sooner. #[phone]      Thanks,  Luiz Tuttle

## 2022-10-21 DIAGNOSIS — Z79.4 TYPE 2 DIABETES MELLITUS WITH MICROALBUMINURIA, WITH LONG-TERM CURRENT USE OF INSULIN (HCC): ICD-10-CM

## 2022-10-21 DIAGNOSIS — R80.9 TYPE 2 DIABETES MELLITUS WITH MICROALBUMINURIA, WITH LONG-TERM CURRENT USE OF INSULIN (HCC): ICD-10-CM

## 2022-10-21 DIAGNOSIS — E11.29 TYPE 2 DIABETES MELLITUS WITH MICROALBUMINURIA, WITH LONG-TERM CURRENT USE OF INSULIN (HCC): ICD-10-CM

## 2022-10-21 RX ORDER — INSULIN LISPRO 200 [IU]/ML
INJECTION, SOLUTION SUBCUTANEOUS
Qty: 90 ML | Refills: 2 | Status: SHIPPED | OUTPATIENT
Start: 2022-10-21

## 2022-10-28 ENCOUNTER — OFFICE VISIT (OUTPATIENT)
Dept: ENDOCRINOLOGY | Age: 42
End: 2022-10-28
Payer: COMMERCIAL

## 2022-10-28 VITALS
SYSTOLIC BLOOD PRESSURE: 120 MMHG | HEIGHT: 62 IN | WEIGHT: 293 LBS | DIASTOLIC BLOOD PRESSURE: 69 MMHG | BODY MASS INDEX: 53.92 KG/M2 | HEART RATE: 89 BPM

## 2022-10-28 DIAGNOSIS — R80.9 TYPE 2 DIABETES MELLITUS WITH MICROALBUMINURIA, WITH LONG-TERM CURRENT USE OF INSULIN (HCC): Primary | ICD-10-CM

## 2022-10-28 DIAGNOSIS — E11.29 TYPE 2 DIABETES MELLITUS WITH MICROALBUMINURIA, WITH LONG-TERM CURRENT USE OF INSULIN (HCC): Primary | ICD-10-CM

## 2022-10-28 DIAGNOSIS — Z79.4 TYPE 2 DIABETES MELLITUS WITH MICROALBUMINURIA, WITH LONG-TERM CURRENT USE OF INSULIN (HCC): Primary | ICD-10-CM

## 2022-10-28 LAB — HBA1C MFR BLD HPLC: 6.9 %

## 2022-10-28 PROCEDURE — 83036 HEMOGLOBIN GLYCOSYLATED A1C: CPT | Performed by: INTERNAL MEDICINE

## 2022-10-28 PROCEDURE — 99214 OFFICE O/P EST MOD 30 MIN: CPT | Performed by: INTERNAL MEDICINE

## 2022-10-28 PROCEDURE — 3044F HG A1C LEVEL LT 7.0%: CPT | Performed by: INTERNAL MEDICINE

## 2022-10-28 PROCEDURE — 3078F DIAST BP <80 MM HG: CPT | Performed by: INTERNAL MEDICINE

## 2022-10-28 PROCEDURE — 3074F SYST BP LT 130 MM HG: CPT | Performed by: INTERNAL MEDICINE

## 2022-10-28 RX ORDER — LOSARTAN POTASSIUM 100 MG/1
100 TABLET ORAL DAILY
Qty: 90 TABLET | Refills: 3 | Status: SHIPPED | OUTPATIENT
Start: 2022-10-28

## 2022-10-28 RX ORDER — BLOOD-GLUCOSE SENSOR
EACH MISCELLANEOUS
COMMUNITY
Start: 2022-10-21

## 2022-10-28 RX ORDER — METOCLOPRAMIDE 10 MG/1
10 TABLET ORAL
COMMUNITY

## 2022-10-28 RX ORDER — PANTOPRAZOLE SODIUM 40 MG/1
40 TABLET, DELAYED RELEASE ORAL DAILY
Qty: 90 TABLET | Refills: 3 | Status: SHIPPED | OUTPATIENT
Start: 2022-10-28

## 2022-10-28 NOTE — PROGRESS NOTES
This is a 79-year-old woman with a history of type 2 diabetes mellitus who I previously followed. I have not seen her since May 2021. Since I saw her last she developed symptoms of gallbladder disease and she underwent cholecystectomy in August.  Since that time she has had symptoms suggestive of gastroparesis. She presented to [facility] with symptoms of gastroparesis following a gastric emptying study. On presentation 9/22/2022, she had a glucose of 379, creatinine 1.65, CO2 22, anion gap 8 , AST of 13, ALT 28, A1c 8.1%. Urinalysis 4+ glucose and 2+ ketones with moderate blood. Treated with metoclopramide and Zofran. Has been on GLP's in the past but has not tolerated them. Regarding her diabetes, the patient has a longstanding history of hyperglycemia poorly controlled. Current Diabetes Medication  Lantus insulin 100 units AM  Humalog 20 to 30 units of Humalog with meals. At our last visit her A1c was 10.4%. Her A1c in Sept was 8.1%. Her A1c today is 6.9%. She tells me that she recently got a Dexcom and that has really helped her and may explain the improved A1c over the last several weeks. However beginning in November 2021, she began developing symptoms that were initially felt to be gallbladder in origin. She underwent cholecystectomy but unfortunately the symptoms have persisted and in fact worsened. .  Tells me today that she has thought perhaps she might have a gluten enteropathy and that may explain her symptoms. Emanation  Blood pressure 120/69  Pulse 64  Weight 299  BMI 54.8  Extremities  There is trace edema  Diabetic foot exam:     Left Foot:   Visual Exam: normal    Pulse DP: 2+ (normal)   Filament test: normal sensation    Vibratory sensation: normal      Right Foot:   Visual Exam: normal    Pulse DP: 2+ (normal)   Filament test: normal sensation    Vibratory sensation: normal     Impression  1.   Type 2 diabetes mellitus with a significant improvement in glucose control after starting the Dexcom CGM  2. Obesity/insulin resistance  3. Gastroparesis currently on Reglan 5 mg 3 times daily  4. Hypertension    Plan:  1. I have continue the 100 units of Lantus daily which does a very nice job of providing basal coverage for 24 hours  2. We have continued the 20 to 30 units of mealtime insulin  3. My hope is that with continued monitoring with the Dexcom we can begin to back down on her insulin to help her accomplish some weight loss  4. I will see her back in 3 to 4 months.

## 2022-12-20 RX ORDER — INSULIN GLARGINE 100 [IU]/ML
INJECTION, SOLUTION SUBCUTANEOUS
Qty: 30 ML | Refills: 10 | Status: SHIPPED | OUTPATIENT
Start: 2022-12-20

## 2022-12-20 NOTE — TELEPHONE ENCOUNTER
Requested Prescriptions     Pending Prescriptions Disp Refills    insulin glargine (Lantus Solostar U-100 Insulin) 100 unit/mL (3 mL) inpn  10     Sig: INJECT 100  UNITS SUBCUTANEOUSLY ROUTE DAILY

## 2023-01-11 ENCOUNTER — OFFICE VISIT (OUTPATIENT)
Dept: ENDOCRINOLOGY | Age: 43
End: 2023-01-11
Payer: COMMERCIAL

## 2023-01-11 VITALS
HEART RATE: 88 BPM | BODY MASS INDEX: 53.92 KG/M2 | SYSTOLIC BLOOD PRESSURE: 164 MMHG | WEIGHT: 293 LBS | HEIGHT: 62 IN | DIASTOLIC BLOOD PRESSURE: 86 MMHG

## 2023-01-11 DIAGNOSIS — R80.9 TYPE 2 DIABETES MELLITUS WITH MICROALBUMINURIA, WITH LONG-TERM CURRENT USE OF INSULIN (HCC): Primary | ICD-10-CM

## 2023-01-11 DIAGNOSIS — E11.29 TYPE 2 DIABETES MELLITUS WITH MICROALBUMINURIA, WITH LONG-TERM CURRENT USE OF INSULIN (HCC): Primary | ICD-10-CM

## 2023-01-11 DIAGNOSIS — Z79.4 TYPE 2 DIABETES MELLITUS WITH MICROALBUMINURIA, WITH LONG-TERM CURRENT USE OF INSULIN (HCC): Primary | ICD-10-CM

## 2023-01-11 PROCEDURE — 3077F SYST BP >= 140 MM HG: CPT | Performed by: INTERNAL MEDICINE

## 2023-01-11 PROCEDURE — 99214 OFFICE O/P EST MOD 30 MIN: CPT | Performed by: INTERNAL MEDICINE

## 2023-01-11 PROCEDURE — 3079F DIAST BP 80-89 MM HG: CPT | Performed by: INTERNAL MEDICINE

## 2023-01-11 RX ORDER — BUPROPION HYDROCHLORIDE 150 MG/1
2 TABLET ORAL DAILY
COMMUNITY
Start: 2022-11-09

## 2023-01-11 NOTE — PROGRESS NOTES
This is a 19-year-old woman with a history of type 2 diabetes mellitus who I previously followed. I have not seen her since May 2021. Since I saw her last she developed symptoms of gallbladder disease and she underwent cholecystectomy in August.  Since that time she has had symptoms suggestive of gastroparesis. She presented to [facility] with symptoms of gastroparesis following a gastric emptying study. On presentation 9/22/2022, she had a glucose of 379, creatinine 1.65, CO2 22, anion gap 8 , AST of 13, ALT 28, A1c 8.1%. Urinalysis 4+ glucose and 2+ ketones with moderate blood. Treated with metoclopramide and Zofran. Has been on GLP's in the past but has not tolerated them. Regarding her diabetes, the patient has a longstanding history of hyperglycemia poorly controlled. Current Diabetes Medication  Lantus insulin 100 units AM  Humalog 20 to 30 units of Humalog with meals. At our last visit her A1c was 10.4%. Her A1c in Sept was 8.1%. Her A1c in October was  6.9%. She tells me that she recently got a Dexcom and that has really helped her and may explain the improved A1c over the last several weeks. However beginning in November 2021, she began developing symptoms that were initially felt to be gallbladder in origin. She underwent cholecystectomy but unfortunately the symptoms have persisted and in fact worsened. .  Tells me today that she has thought perhaps she might have a gluten enteropathy and that may explain her symptoms. She admits that she has had difficulty over the holidays. Her blood sugars have been higher and the A1c on her Dexcom reflects that. Her blood sugar first thing in the morning is the best of the day but then she climbs throughout the day as demonstrated on the Dexcom.   She admits that she has been missing doses of Humalog and likely that explains the problem    Examination  Blood pressure 164/86  Pulse 80 and regular  Weight 305  BMI 55.8  HEENT unremarkable  Lungs clear  Heart reveals a regular rate and rhythm  Abdomen obese  Extremities unremarkable    Impression  1. Type 2 diabetes mellitus with worsening glucose control secondary to nonadherence to the insulin regimen  2. Obesity    Plan:  1. Given the fact that she is intolerant to the GLP's after trying multiple GLP's, we have focused on the mealtime insulin. The dose of long-acting insulin seems appropriate  2. I will see her back in 3 months. This note that she is highly motivated to get better blood sugars better because she does not want to be hospitalized for an exacerbation of her gastroparesis yet again.

## 2023-03-16 ENCOUNTER — ANESTHESIA EVENT (OUTPATIENT)
Dept: ENDOSCOPY | Age: 43
End: 2023-03-16
Payer: COMMERCIAL

## 2023-03-16 NOTE — ANESTHESIA PREPROCEDURE EVALUATION
Anesthetic History   No history of anesthetic complications            Review of Systems / Medical History  Patient summary reviewed, nursing notes reviewed and pertinent labs reviewed    Pulmonary        Sleep apnea: CPAP    Asthma : well controlled       Neuro/Psych         Psychiatric history     Cardiovascular    Hypertension              Exercise tolerance: <4 METS  Comments: No ECG on file   GI/Hepatic/Renal         Renal disease: CRI      Comments: Biliary Dyskinesia    Hx Appendectomy (5/23/16) Endo/Other    Diabetes: type 2, using insulin    Morbid obesity     Other Findings              Physical Exam    Airway  Mallampati: I    Neck ROM: normal range of motion   Mouth opening: Normal     Cardiovascular  Regular rate and rhythm,  S1 and S2 normal,  no murmur, click, rub, or gallop             Dental      Comments: 1 missing, none loose   Pulmonary  Breath sounds clear to auscultation               Abdominal  GI exam deferred       Other Findings            Anesthetic Plan    ASA: 3  Anesthesia type: MAC          Induction: Intravenous  Anesthetic plan and risks discussed with: Patient

## 2023-03-17 ENCOUNTER — ANESTHESIA (OUTPATIENT)
Dept: ENDOSCOPY | Age: 43
End: 2023-03-17
Payer: COMMERCIAL

## 2023-03-17 ENCOUNTER — HOSPITAL ENCOUNTER (OUTPATIENT)
Age: 43
Setting detail: OUTPATIENT SURGERY
Discharge: HOME | End: 2023-03-17
Attending: SPECIALIST | Admitting: SPECIALIST
Payer: COMMERCIAL

## 2023-03-17 VITALS
OXYGEN SATURATION: 100 % | TEMPERATURE: 98 F | HEIGHT: 63 IN | BODY MASS INDEX: 51.91 KG/M2 | HEART RATE: 82 BPM | DIASTOLIC BLOOD PRESSURE: 79 MMHG | RESPIRATION RATE: 17 BRPM | WEIGHT: 293 LBS | SYSTOLIC BLOOD PRESSURE: 162 MMHG

## 2023-03-17 LAB
GLUCOSE BLD STRIP.AUTO-MCNC: 84 MG/DL (ref 65–117)
HCG UR QL: NEGATIVE
SERVICE CMNT-IMP: NORMAL

## 2023-03-17 PROCEDURE — 77030039825 HC MSK NSL PAP SUPERNO2VA VYRM -B: Performed by: ANESTHESIOLOGY

## 2023-03-17 PROCEDURE — 2709999900 HC NON-CHARGEABLE SUPPLY: Performed by: SPECIALIST

## 2023-03-17 PROCEDURE — 74011250636 HC RX REV CODE- 250/636: Performed by: NURSE ANESTHETIST, CERTIFIED REGISTERED

## 2023-03-17 PROCEDURE — 77030013990 HC SNR POLYP ACUSNR COOK -B: Performed by: SPECIALIST

## 2023-03-17 PROCEDURE — 74011000250 HC RX REV CODE- 250: Performed by: NURSE ANESTHETIST, CERTIFIED REGISTERED

## 2023-03-17 PROCEDURE — 88305 TISSUE EXAM BY PATHOLOGIST: CPT

## 2023-03-17 PROCEDURE — 76060000032 HC ANESTHESIA 0.5 TO 1 HR: Performed by: SPECIALIST

## 2023-03-17 PROCEDURE — 74011250636 HC RX REV CODE- 250/636: Performed by: SPECIALIST

## 2023-03-17 PROCEDURE — 82962 GLUCOSE BLOOD TEST: CPT

## 2023-03-17 PROCEDURE — 76040000007: Performed by: SPECIALIST

## 2023-03-17 PROCEDURE — 81025 URINE PREGNANCY TEST: CPT

## 2023-03-17 RX ORDER — SODIUM CHLORIDE 0.9 % (FLUSH) 0.9 %
5-40 SYRINGE (ML) INJECTION EVERY 8 HOURS
Status: DISCONTINUED | OUTPATIENT
Start: 2023-03-17 | End: 2023-03-17 | Stop reason: HOSPADM

## 2023-03-17 RX ORDER — PROPOFOL 10 MG/ML
INJECTION, EMULSION INTRAVENOUS AS NEEDED
Status: DISCONTINUED | OUTPATIENT
Start: 2023-03-17 | End: 2023-03-17 | Stop reason: HOSPADM

## 2023-03-17 RX ORDER — DEXTROMETHORPHAN/PSEUDOEPHED 2.5-7.5/.8
1.2 DROPS ORAL
Status: DISCONTINUED | OUTPATIENT
Start: 2023-03-17 | End: 2023-03-17 | Stop reason: HOSPADM

## 2023-03-17 RX ORDER — SODIUM CHLORIDE 0.9 % (FLUSH) 0.9 %
5-40 SYRINGE (ML) INJECTION AS NEEDED
Status: DISCONTINUED | OUTPATIENT
Start: 2023-03-17 | End: 2023-03-17 | Stop reason: HOSPADM

## 2023-03-17 RX ORDER — LIDOCAINE HYDROCHLORIDE 20 MG/ML
INJECTION, SOLUTION EPIDURAL; INFILTRATION; INTRACAUDAL; PERINEURAL AS NEEDED
Status: DISCONTINUED | OUTPATIENT
Start: 2023-03-17 | End: 2023-03-17 | Stop reason: HOSPADM

## 2023-03-17 RX ORDER — SODIUM CHLORIDE 9 MG/ML
50 INJECTION, SOLUTION INTRAVENOUS CONTINUOUS
Status: DISCONTINUED | OUTPATIENT
Start: 2023-03-17 | End: 2023-03-17 | Stop reason: HOSPADM

## 2023-03-17 RX ADMIN — PROPOFOL 30 MG: 10 INJECTION, EMULSION INTRAVENOUS at 07:24

## 2023-03-17 RX ADMIN — PROPOFOL 30 MG: 10 INJECTION, EMULSION INTRAVENOUS at 07:18

## 2023-03-17 RX ADMIN — PROPOFOL 50 MG: 10 INJECTION, EMULSION INTRAVENOUS at 07:22

## 2023-03-17 RX ADMIN — PROPOFOL 30 MG: 10 INJECTION, EMULSION INTRAVENOUS at 07:14

## 2023-03-17 RX ADMIN — PROPOFOL 50 MG: 10 INJECTION, EMULSION INTRAVENOUS at 07:12

## 2023-03-17 RX ADMIN — SODIUM CHLORIDE 50 ML/HR: 9 INJECTION, SOLUTION INTRAVENOUS at 06:52

## 2023-03-17 RX ADMIN — PROPOFOL 50 MG: 10 INJECTION, EMULSION INTRAVENOUS at 07:26

## 2023-03-17 RX ADMIN — PROPOFOL 50 MG: 10 INJECTION, EMULSION INTRAVENOUS at 07:20

## 2023-03-17 RX ADMIN — PROPOFOL 100 MG: 10 INJECTION, EMULSION INTRAVENOUS at 07:09

## 2023-03-17 RX ADMIN — LIDOCAINE HYDROCHLORIDE 100 MG: 20 INJECTION, SOLUTION INTRAVENOUS at 07:07

## 2023-03-17 RX ADMIN — PROPOFOL 30 MG: 10 INJECTION, EMULSION INTRAVENOUS at 07:27

## 2023-03-17 RX ADMIN — PROPOFOL 30 MG: 10 INJECTION, EMULSION INTRAVENOUS at 07:17

## 2023-03-17 RX ADMIN — PROPOFOL 50 MG: 10 INJECTION, EMULSION INTRAVENOUS at 07:15

## 2023-03-17 NOTE — PROCEDURES
Colonoscopy Procedure Note    Indications:   Screening colonoscopy    Referring Physician: Angela Garcia MD  Anesthesia/Sedation: MAC anesthesia Propofol  Endoscopist:  Dr. Bonds Daily    Procedure in Detail:  Informed consent was obtained for the procedure, including sedation. Risks of perforation, hemorrhage, adverse drug reaction, and aspiration were discussed. The patient was placed in the left lateral decubitus position. Based on the pre-procedure assessment, including review of the patient's medical history, medications, allergies, and review of systems, she had been deemed to be an appropriate candidate for moderate sedation; she was therefore sedated with the medications listed above. The patient was monitored continuously with ECG tracing, pulse oximetry, blood pressure monitoring, and direct observations. A rectal examination was performed. The [id number] was inserted into the rectum and advanced under direct vision to the cecum, which was identified by the ileocecal valve and appendiceal orifice. The quality of the colonic preparation was adequate. A careful inspection was made as the colonoscope was withdrawn, including a retroflexed view of the rectum; findings and interventions are described below. Appropriate photodocumentation was obtained. Findings:    Scope advanced to the cecum. Preparation was adequate. (1) sessile polyp 1 cm in the transverse colon s/p hot snare removal.   (1) pedunculated polyp 8 mm friable appearing in the splenic flexure s/p hot snare removal.   Scattered diverticulosis in the sigmoid colon. Therapies:  see above    Specimen: Specimens were collected as described above and sent to pathology. Complications: None were encountered during the procedure. EBL: < 10 ml.     Recommendations:   -F/U Path  -Repeat Colonoscopy 3 years     Signed By: Hillary Obando MD                        March 17, 2023

## 2023-03-17 NOTE — ROUTINE PROCESS
Shweta Luis Antonio  1980  [id number]    Situation:  Verbal report received from: Bolivar Muniz  Procedure: Procedure(s):  COLONOSCOPY  ENDOSCOPIC POLYPECTOMY    Background:    Preoperative diagnosis: Anemia, unspecified type [D64.9]  Nausea and vomiting, unspecified vomiting type [R11.2]  Obesity, unspecified classification, unspecified obesity type, unspecified whether serious comorbidity present [E66.9]  Type 2 diabetes mellitus with other specified complication, unspecified whether long term insulin use ([facility] [E11.69]  Postoperative diagnosis: colon - polyps and diverticulosis    :  Dr. Lucas Rojo  Assistant(s): Endoscopy Technician-1: Kip Her  Endoscopy RN-1: Evelyn Hunt RN    Specimens:   ID Type Source Tests Collected by Time Destination   1 : Colon, Transverse polyp Preservative Colon, Transverse  Elizabeth Palomino MD 3/17/2023 1572 Pathology   2 : Splenic Flexure polyp Preservative Splenic Flexure  Elizabeth Palomino MD 3/17/2023 1602 Pathology     H. Pylori  no    Assessment:  Intra-procedure medications     Anesthesia gave intra-procedure sedation and medications, see anesthesia flow sheet.     Intravenous fluids: NS@ KVO     Vital signs stable    Abdominal assessment: round and soft    Recommendation:  Discharge patient per MD order  Family  Permission to share finding with family or friend yes

## 2023-03-17 NOTE — PROGRESS NOTES
Patient complains of pain at the tip of her tongue. Upon assessment patient has what appears to be a tooth imprint at the tip of her tongue.    The opening of the mouth was not accessed intraprocedure

## 2023-03-17 NOTE — H&P
Gastroenterology Outpatient History and Physical    Patient: Ajit Corrigan    Physician: Aurelia Shah MD    Vital Signs: Blood pressure (!) 160/72, pulse 87, temperature 97.7 °F (36.5 °C), resp. rate 16, height 5' 3\" (1.6 m), weight 146.5 kg (322 lb 14.4 oz), last menstrual period 2023, SpO2 99 %, not currently breastfeeding. Allergies: Allergies   Allergen Reactions    Amoxicillin Angioedema    Celexa [Citalopram] Anxiety    Penicillins Angioedema     As an adult       Chief Complaint: Screening colonoscopy    History of Present Illness: above    Justification for Procedure: above    History:  Past Medical History:   Diagnosis Date    Asthma     BMI 50.0-59.9, adult ([facility] 2016    Diabetes mellitus type II     Hypertension     Psychiatric disorder     anxiety/depression    Sleep apnea     cpap 5 YEARS      Past Surgical History:   Procedure Laterality Date    HX APPENDECTOMY  2016    Kevin Rodriguez MD    HX  SECTION      HX CHOLECYSTECTOMY      HX ENDOSCOPY      HX LAP CHOLECYSTECTOMY  2022    lap shayan, Intraoperative cholangiogram with intraoperative interpretation      Social History     Socioeconomic History    Marital status:    Tobacco Use    Smoking status: Never    Smokeless tobacco: Never   Vaping Use    Vaping Use: Former   Substance and Sexual Activity    Alcohol use: No    Drug use: No      Family History   Problem Relation Age of Onset    Cancer Mother         BREAST    No Known Problems Father     Diabetes Maternal Grandfather     Diabetes Paternal Grandmother        Medications:   Prior to Admission medications    Medication Sig Start Date End Date Taking? Authorizing Provider   insulin glargine (Lantus Solostar U-100 Insulin) 100 unit/mL (3 mL) inpn INJECT 100  UNITS SUBCUTANEOUSLY ROUTE DAILY 22  Yes Josh Blevins MD   Dexcom G6 Sensor leonie  10/21/22  Yes Provider, Historical   metoclopramide HCl (REGLAN) 10 mg tablet Take 10 mg by mouth as needed. Yes Provider, Historical   pantoprazole (PROTONIX) 40 mg tablet Take 1 Tablet by mouth daily. 10/28/22  Yes Uvaldo Bailey MD   losartan (COZAAR) 100 mg tablet Take 1 Tablet by mouth daily. 10/28/22  Yes Uvaldo Bailey MD   insulin lispro (HumaLOG KwikPen Insulin) 200 unit/mL (3 mL) inpn INJECT 20 TO 40 UNITS THREE TIMES A DAY BEFORE MEALS  Patient taking differently: 15-30 units before breakfast, 15-30 units before lunch and 15-30 units before dinner 10/21/22  Yes Uvaldo Bailey MD   Dexcom G6  misc See Admin Instructions. 8/19/22  Yes Provider, Historical   Insulin Needles, Disposable, (Pamella Pen Needle) 32 gauge x 5/32\" ndle For insulin up to 5 times daily 3/18/20  Yes Uvaldo Bailey MD   Insulin Needles, Disposable, (PAMELLA PEN NEEDLE) 32 gauge x 5/32\" ndle 4 times daily 1/17/17  Yes Lisa Izquierdo MD       Physical Exam:   General: alert, no distress   HEENT: Head: Normocephalic, no lesions, without obvious abnormality.    Heart: regular rate and rhythm, S1, S2 normal, no murmur, click, rub or gallop   Lungs: chest clear, no wheezing, rales, normal symmetric air entry   Abdominal: Soft, NT/ND + Bowel sounds are normal, liver is not enlarged, spleen is not enlarged   Neurological: Grossly normal   Extremities: extremities normal, atraumatic, no cyanosis or edema     Findings/Diagnosis: Screening colonoscopy    Plan of Care/Planned Procedure: Colonoscopy

## 2023-03-17 NOTE — ROUTINE PROCESS
Endoscope was pre-cleaned at the bedside immediately following procedure by Tres Reyes RN    Medications       lidocaine (PF) 2% (mg)       Date/Time Rate/Dose/Volume Action Route Admin User Audit       03/17/23  0707 100 mg Given IntraVENous Last Batters, CRNA                propofol 10 mg/mL (mg)       Date/Time Rate/Dose/Volume Action Route Admin User Audit       03/17/23  0709 100 mg Given IntraVENous Last Batters, CRNA       3283 50 mg Given IntraVENous Last Batters, CRNA       2698 30 mg Given IntraVENous Last Batters, CRNA       0715 50 mg Given IntraVENous Last Batters, CRNA       8468 30 mg Given IntraVENous Last Batters, CRNA       9957 30 mg Given IntraVENous Last Batters, CRNA       0720 50 mg Given IntraVENous Last Batters, CRNA       9635 50 mg Given IntraVENous Last Batters, CRNA       4072 30 mg Given IntraVENous Last Batters, CRNA       6263 50 mg Given IntraVENous Last Batters, CRNA       0608 30 mg Given IntraVENous Last Batters, CRNA                0.9% sodium chloride infusion (mL/hr)       Date/Time Rate/Dose/Volume Action Route Admin User Audit       03/17/23  0702 50 mL/hr Rate Verify IntraVENous Last Batters, CRNA edited      0730 600 mL/hr Rate Change IntraVENous Last Batters, CRNA                    Glasses returned to patient.

## 2023-03-17 NOTE — ANESTHESIA POSTPROCEDURE EVALUATION
Procedure(s):  COLONOSCOPY  ENDOSCOPIC POLYPECTOMY. MAC    Anesthesia Post Evaluation      Multimodal analgesia: multimodal analgesia used between 6 hours prior to anesthesia start to PACU discharge  Patient location during evaluation: bedside  Patient participation: complete - patient participated  Level of consciousness: awake  Pain management: adequate  Airway patency: patent  Anesthetic complications: no  Cardiovascular status: acceptable  Respiratory status: acceptable  Hydration status: acceptable  Post anesthesia nausea and vomiting:  controlled  Final Post Anesthesia Temperature Assessment:  Normothermia (36.0-37.5 degrees C)      INITIAL Post-op Vital signs:   Vitals Value Taken Time   /84 03/17/23 0751   Temp 36.7 °C (98 °F) 03/17/23 0746   Pulse 83 03/17/23 0753   Resp 16 03/17/23 0753   SpO2 100 % 03/17/23 0753   Vitals shown include unvalidated device data.

## 2023-03-17 NOTE — DISCHARGE INSTRUCTIONS
Amy Jimenez  [id number]  1980    COLON DISCHARGE INSTRUCTIONS  Discomfort:  Redness at IV site- apply warm compress to area; if redness or soreness persist- contact your physician  There may be a slight amount of blood passed from the rectum  Gaseous discomfort- walking, belching will help relieve any discomfort  You may not operate a vehicle for 12 hours  You may not engage in an occupation involving machinery or appliances for rest of today  You may not drink alcoholic beverages for at least 12 hours  Avoid making any critical decisions for at least 24 hour  DIET:   Regular diet. - however -  remember your colon is empty and a heavy meal will produce gas. Avoid these foods:  vegetables, fried / greasy foods, carbonated drinks for today. MEDICATIONS:        Regarding Aspirin or Nonsteroidal medications, please see below. ACTIVITY:  You may resume your normal daily activities it is recommended that you spend the remainder of the day resting -  avoid any strenuous activity. CALL M.D. ANY SIGN OF:  Increasing pain, nausea, vomiting  Abdominal distension (swelling)  New increased bleeding (oral or rectal)  Fever (chills)  Pain in chest area  Bloody discharge from nose or mouth  Shortness of breath  Tylenol as needed for pain.       Follow-up Instructions:   Call Dr. Alicea Figures for results of procedure / biopsy in 4-5 days at telephone #  [phone]              Repeat Colonoscopy in 3 years    Patient Education on Sedation / Analgesia Administered for Procedure      For 24 hours after general anesthesia or intravenous analgesia / sedation:  Have someone responsible help you with your care  Limit your activities  Do not drive and operate hazardous machinery  Do not make important personal, legal or business decisions  Do not drink alcoholic beverages  If you have not urinated within 8 hours after discharge, please contact your physician  Resume your medications unless otherwise instructed    For 24 hours after general anesthesia or intravenous analgesia / sedation  you may experience:  Drowsiness, dizziness, sleepiness, or confusion  Difficulty remembering or delayed reaction times  Difficulty with your balance, especially while walking, move slowly and carefully, do not make sudden position changes  Difficulty focusing or blurred vision    You may not be aware of slight changes in your behavior and/or your reaction time because of the medication used during and after your procedure.     Report the following to your physician:  Excessive pain, swelling, redness or odor of or around the surgical area  Temperature over 100.5  Nausea and vomiting lasting longer than 4 hours or if unable to take medications  Any signs of decreased circulation or nerve impairment to extremity: change in color, persistent numbness, tingling, coldness or increase pain  Any questions or concerns    IF YOU REPORT TO AN EMERGENCY ROOM, DOCTOR'S OFFICE OR HOSPITAL WITHIN 24 HOURS AFTER YOUR PROCEDURE, BRING THIS SHEET AND YOUR AFTER VISIT SUMMARY WITH YOU AND GIVE IT TO THE PHYSICIAN OR NURSE ATTENDING YOU.

## 2023-07-18 NOTE — TELEPHONE ENCOUNTER
Requested Prescriptions     Pending Prescriptions Disp Refills    Continuous Blood Gluc Transmit (DEXCOM G6 TRANSMITTER) MISC 1 each 4     Sig: Change every 90 days.

## 2023-07-19 RX ORDER — PROCHLORPERAZINE 25 MG/1
SUPPOSITORY RECTAL
Qty: 1 EACH | Refills: 4 | Status: SHIPPED | OUTPATIENT
Start: 2023-07-19

## 2023-07-21 ENCOUNTER — OFFICE VISIT (OUTPATIENT)
Age: 43
End: 2023-07-21
Payer: COMMERCIAL

## 2023-07-21 VITALS
DIASTOLIC BLOOD PRESSURE: 83 MMHG | WEIGHT: 293 LBS | SYSTOLIC BLOOD PRESSURE: 143 MMHG | HEART RATE: 113 BPM | BODY MASS INDEX: 51.91 KG/M2 | HEIGHT: 63 IN

## 2023-07-21 DIAGNOSIS — E11.29 TYPE 2 DIABETES MELLITUS WITH OTHER DIABETIC KIDNEY COMPLICATION (HCC): Primary | ICD-10-CM

## 2023-07-21 LAB — HBA1C MFR BLD: 10 %

## 2023-07-21 PROCEDURE — 3077F SYST BP >= 140 MM HG: CPT | Performed by: INTERNAL MEDICINE

## 2023-07-21 PROCEDURE — 83036 HEMOGLOBIN GLYCOSYLATED A1C: CPT | Performed by: INTERNAL MEDICINE

## 2023-07-21 PROCEDURE — 3079F DIAST BP 80-89 MM HG: CPT | Performed by: INTERNAL MEDICINE

## 2023-07-21 PROCEDURE — 99214 OFFICE O/P EST MOD 30 MIN: CPT | Performed by: INTERNAL MEDICINE

## 2023-07-21 RX ORDER — BLOOD SUGAR DIAGNOSTIC
1 STRIP MISCELLANEOUS DAILY
Qty: 100 EACH | Refills: 3 | Status: SHIPPED | OUTPATIENT
Start: 2023-07-21

## 2023-07-21 RX ORDER — PROCHLORPERAZINE 25 MG/1
SUPPOSITORY RECTAL
COMMUNITY
Start: 2023-07-06

## 2023-07-21 RX ORDER — DEXTROAMPHETAMINE SACCHARATE, AMPHETAMINE ASPARTATE MONOHYDRATE, DEXTROAMPHETAMINE SULFATE AND AMPHETAMINE SULFATE 7.5; 7.5; 7.5; 7.5 MG/1; MG/1; MG/1; MG/1
CAPSULE, EXTENDED RELEASE ORAL
COMMUNITY
Start: 2023-07-18

## 2023-07-21 NOTE — PROGRESS NOTES
This is a 72-year-old woman with a history of type 2 diabetes mellitus. She has had diabetes since around 2007  In 1/2019 C-peptide was 1.1 with glucose of 169. Has been on GLP's in the past but has not tolerated them. She developed symptoms of gallbladder disease and she underwent cholecystectomy in August.2022. Since that time  she has had symptoms suggestive of gastroparesis. She presented to [facility] with symptoms of gastroparesis following  a gastric emptying study. On presentation 9/22/2022, she had a glucose of 379, creatinine 1.65, CO2 22, anion gap 8 , AST of 13, ALT 28, A1c 8.1%. Urinalysis 4+ glucose and 2+ ketones with moderate blood. Treated with metoclopramide and Zofran. At our last visit her A1c was  9.3%. Her A1c today is 10.0%. Current Diabetes Medication   Lantus insulin 100 units AM   Humalog 20 to 30 units of Humalog with meals. The patient tells me that she has tried to fast and has gone days without eating anything. Despite that her blood sugars are elevated and she is very frustrated. At the same time, she admits that she frequently misses the insulin. In part she says she just forgets. Examination  Blood pressure 140/83  Pulse 88  06 (she is lost 18 pounds)  BMI 54.2  HEENT unremarkable  Lungs clear  Heart is regular rate rhythm  Abdomen obese  Extremities unremarkable    Impression  1. Type 2 diabetes mellitus with worsening glucose control  2. Morbid obesity  3. History gastroparesis    Plan:  24-hour fast after taking 100 units of Lantus once  2. I will review her Dexcom data and get back to her.   Continue believe that the optimal strategy for this woman is bariatric surgery given her intolerance to GLP1.  3.  Perhaps a new GLP/agonists

## 2023-07-26 ENCOUNTER — PATIENT MESSAGE (OUTPATIENT)
Age: 43
End: 2023-07-26

## 2023-07-26 DIAGNOSIS — E11.29 TYPE 2 DIABETES MELLITUS WITH OTHER DIABETIC KIDNEY COMPLICATION (HCC): Primary | ICD-10-CM

## 2023-07-26 RX ORDER — PEN NEEDLE, DIABETIC 32GX 5/32"
1 NEEDLE, DISPOSABLE MISCELLANEOUS 3 TIMES DAILY
Qty: 300 EACH | Refills: 3 | Status: SHIPPED | OUTPATIENT
Start: 2023-07-26

## 2023-07-26 NOTE — TELEPHONE ENCOUNTER
From: Phillip Rodriguez  To: Dr. Yamila Torres  Sent: 7/26/2023 1:20 PM EDT  Subject: Needles for insulin pens. .    I completely forgot to mention at my recent visit that I'm getting low on needles for my insulin pens. It has been so long, I'd need a new Rx for them. I have the BD Nanos Ultra-Fine 4mm x 32G. .. if you could send a script to [facility] when you get a chance? Thank You!

## 2023-11-07 RX ORDER — PANTOPRAZOLE SODIUM 40 MG/1
40 TABLET, DELAYED RELEASE ORAL DAILY
Qty: 90 TABLET | Refills: 3 | Status: SHIPPED | OUTPATIENT
Start: 2023-11-07

## 2023-11-07 RX ORDER — INSULIN LISPRO 200 [IU]/ML
INJECTION, SOLUTION SUBCUTANEOUS
Qty: 90 ML | Refills: 3 | Status: SHIPPED | OUTPATIENT
Start: 2023-11-07

## 2023-11-07 RX ORDER — LOSARTAN POTASSIUM 100 MG/1
100 TABLET ORAL DAILY
Qty: 90 TABLET | Refills: 3 | Status: SHIPPED | OUTPATIENT
Start: 2023-11-07

## 2023-11-10 ENCOUNTER — OFFICE VISIT (OUTPATIENT)
Age: 43
End: 2023-11-10
Payer: COMMERCIAL

## 2023-11-10 VITALS
SYSTOLIC BLOOD PRESSURE: 168 MMHG | HEART RATE: 95 BPM | WEIGHT: 293 LBS | BODY MASS INDEX: 51.91 KG/M2 | DIASTOLIC BLOOD PRESSURE: 88 MMHG | HEIGHT: 63 IN

## 2023-11-10 DIAGNOSIS — E11.29 TYPE 2 DIABETES MELLITUS WITH OTHER DIABETIC KIDNEY COMPLICATION (HCC): Primary | ICD-10-CM

## 2023-11-10 LAB — HBA1C MFR BLD: 9.3 %

## 2023-11-10 PROCEDURE — 3077F SYST BP >= 140 MM HG: CPT | Performed by: INTERNAL MEDICINE

## 2023-11-10 PROCEDURE — 3046F HEMOGLOBIN A1C LEVEL >9.0%: CPT | Performed by: INTERNAL MEDICINE

## 2023-11-10 PROCEDURE — 2022F DILAT RTA XM EVC RTNOPTHY: CPT | Performed by: INTERNAL MEDICINE

## 2023-11-10 PROCEDURE — 1036F TOBACCO NON-USER: CPT | Performed by: INTERNAL MEDICINE

## 2023-11-10 PROCEDURE — 83036 HEMOGLOBIN GLYCOSYLATED A1C: CPT | Performed by: INTERNAL MEDICINE

## 2023-11-10 PROCEDURE — G8428 CUR MEDS NOT DOCUMENT: HCPCS | Performed by: INTERNAL MEDICINE

## 2023-11-10 PROCEDURE — G8417 CALC BMI ABV UP PARAM F/U: HCPCS | Performed by: INTERNAL MEDICINE

## 2023-11-10 PROCEDURE — 99214 OFFICE O/P EST MOD 30 MIN: CPT | Performed by: INTERNAL MEDICINE

## 2023-11-10 PROCEDURE — 3078F DIAST BP <80 MM HG: CPT | Performed by: INTERNAL MEDICINE

## 2023-11-10 PROCEDURE — G8484 FLU IMMUNIZE NO ADMIN: HCPCS | Performed by: INTERNAL MEDICINE

## 2023-11-10 RX ORDER — ACYCLOVIR 400 MG/1
1 TABLET ORAL
Qty: 9 EACH | Refills: 4 | Status: SHIPPED | OUTPATIENT
Start: 2023-11-10

## 2023-11-10 NOTE — PROGRESS NOTES
This is a 37-year-old woman with a history of type 2 diabetes mellitus. She has had diabetes since around 2007  In 1/2019 C-peptide was 1.1 with glucose of 169. Has been on GLP's in the past but has not tolerated them. She developed symptoms of gallbladder disease and she underwent cholecystectomy in August.2022. Since that time  she has had symptoms suggestive of gastroparesis. She presented to [facility] with symptoms of gastroparesis following  a gastric emptying study. On presentation 9/22/2022, she had a glucose of 379, creatinine 1.65, CO2 22, anion gap 8 , AST of 13, ALT 28, A1c 8.1%. Urinalysis 4+ glucose and 2+ ketones with moderate blood. Treated with metoclopramide and Zofran. At our last visit her A1c was  10.0%. Her A1c today is 9.3%. Current Diabetes Medication   Lantus insulin 100 units AM   Humalog 20 to 30 units of Humalog with meals. The patient tells me that she has tried to fast and has gone days without eating anything. Despite that her blood sugars are elevated and she is very frustrated. At the same time, she admits that she frequently misses the insulin. In part she says she just forgets. She presents today with exactly same issue since the last visit. At her last visit, we considered The possibility of bariatric surgery and she is considering this more more. Examination  Blood pressure 168/88  Pulse 100  Weight 300 pounds  BMI 53.1  HEENT unremarkable  Lungs clear  Heart is regular rate and rhythm  Abdomen benign  Extremities unremarkable    Impression  1. Type 2 diabetes mellitus with an A1c of 9.3%  2. Morbid obesity  3. Significant ADHD    Plan:  1. I have sent a referral for bariatric surgery. I would favor [facility]  2. I have encouraged her to take all of her insulin doses, both long-acting and short acting  3.   I will see her back in 3 months    Please note that this dictation was completed with [employer], the computer

## 2023-12-07 ENCOUNTER — TELEPHONE (OUTPATIENT)
Age: 43
End: 2023-12-07

## 2023-12-07 NOTE — TELEPHONE ENCOUNTER
Attempted to contact patient regarding referral from Dr. Malena Gramajo to Dr. Mariia Perry for SWL. No answer, left voicemail for a return call.

## 2024-01-30 ENCOUNTER — PATIENT MESSAGE (OUTPATIENT)
Age: 44
End: 2024-01-30

## 2024-01-30 DIAGNOSIS — E11.29 TYPE 2 DIABETES MELLITUS WITH OTHER DIABETIC KIDNEY COMPLICATION (HCC): Primary | ICD-10-CM

## 2024-01-30 DIAGNOSIS — Z79.4 LONG TERM (CURRENT) USE OF INSULIN (HCC): ICD-10-CM

## 2024-01-30 RX ORDER — INSULIN DEGLUDEC 200 U/ML
100 INJECTION, SOLUTION SUBCUTANEOUS DAILY
Qty: 45 ML | Refills: 3 | Status: SHIPPED | OUTPATIENT
Start: 2024-01-30

## 2024-01-30 RX ORDER — ACYCLOVIR 400 MG/1
1 TABLET ORAL DAILY
Qty: 1 EACH | Refills: 0 | Status: SHIPPED | OUTPATIENT
Start: 2024-01-30

## 2024-03-04 ENCOUNTER — OFFICE VISIT (OUTPATIENT)
Age: 44
End: 2024-03-04
Payer: COMMERCIAL

## 2024-03-04 VITALS
HEIGHT: 63 IN | SYSTOLIC BLOOD PRESSURE: 150 MMHG | WEIGHT: 293 LBS | BODY MASS INDEX: 51.91 KG/M2 | HEART RATE: 97 BPM | DIASTOLIC BLOOD PRESSURE: 82 MMHG

## 2024-03-04 DIAGNOSIS — E11.29 TYPE 2 DIABETES MELLITUS WITH OTHER DIABETIC KIDNEY COMPLICATION (HCC): Primary | ICD-10-CM

## 2024-03-04 LAB — HBA1C MFR BLD: 7.7 %

## 2024-03-04 PROCEDURE — G8428 CUR MEDS NOT DOCUMENT: HCPCS | Performed by: INTERNAL MEDICINE

## 2024-03-04 PROCEDURE — G8484 FLU IMMUNIZE NO ADMIN: HCPCS | Performed by: INTERNAL MEDICINE

## 2024-03-04 PROCEDURE — 3079F DIAST BP 80-89 MM HG: CPT | Performed by: INTERNAL MEDICINE

## 2024-03-04 PROCEDURE — 3046F HEMOGLOBIN A1C LEVEL >9.0%: CPT | Performed by: INTERNAL MEDICINE

## 2024-03-04 PROCEDURE — 1036F TOBACCO NON-USER: CPT | Performed by: INTERNAL MEDICINE

## 2024-03-04 PROCEDURE — 83036 HEMOGLOBIN GLYCOSYLATED A1C: CPT | Performed by: INTERNAL MEDICINE

## 2024-03-04 PROCEDURE — 3077F SYST BP >= 140 MM HG: CPT | Performed by: INTERNAL MEDICINE

## 2024-03-04 PROCEDURE — 2022F DILAT RTA XM EVC RTNOPTHY: CPT | Performed by: INTERNAL MEDICINE

## 2024-03-04 PROCEDURE — 99214 OFFICE O/P EST MOD 30 MIN: CPT | Performed by: INTERNAL MEDICINE

## 2024-03-04 PROCEDURE — G8417 CALC BMI ABV UP PARAM F/U: HCPCS | Performed by: INTERNAL MEDICINE

## 2024-03-04 RX ORDER — AMLODIPINE BESYLATE 5 MG/1
TABLET ORAL
COMMUNITY
Start: 2024-01-30

## 2024-03-04 RX ORDER — LANOLIN ALCOHOL/MO/W.PET/CERES
325 CREAM (GRAM) TOPICAL
COMMUNITY
Start: 2023-12-08 | End: 2024-12-07

## 2024-03-04 RX ORDER — DAPAGLIFLOZIN 10 MG/1
10 TABLET, FILM COATED ORAL DAILY
COMMUNITY
Start: 2023-12-22 | End: 2024-12-21

## 2024-03-04 RX ORDER — ATORVASTATIN CALCIUM 20 MG/1
20 TABLET, FILM COATED ORAL DAILY
COMMUNITY
Start: 2024-01-10

## 2024-03-04 NOTE — PROGRESS NOTES
This is a 43-year-old woman with a history of type 2 diabetes mellitus.  She has had diabetes since around 2007 In 1/2019 C-peptide was 1.1 with glucose of 169.Has been on GLP's in the past but has not tolerated them.       She developed symptoms of gallbladder disease and she underwent cholecystectomy in August.2022.  Since that time  she has had symptoms suggestive of gastroparesis.   She presented to [facility] with symptoms of gastroparesis following  a gastric emptying study.  On presentation 9/22/2022, she had a glucose of 379, creatinine 1.65, CO2 22, anion gap 8 , AST of 13, ALT 28, A1c 8.1%.  Urinalysis 4+ glucose and 2+ ketones with moderate blood. Treated with metoclopramide and Zofran.   At our last visit her A1c was  10.0%.   Her A1c today is 7.7%.        Current Diabetes Medication   Tresiba insulin 100 units AM   Humalog 20 to 30 units of Humalog with meals.         When I saw her last, she agreed to explore bariatric surgery.  She is done part of the work and wants the videos but has not yet had the virtual visit with the bariatric team.  I reviewed her Dexcom data from the week prior to February 17 and the week after February 17 and prior to this the week of the February 17, her blood sugars were really very much better consistent with the A1c of 7.7%.  The last 2 weeks she has fallen off the wagon and she admits that and her blood sugars are much higher.    Examination  Blood pressure 150/82  Pulse 97  Weight 138.8 kg  BMI 54.2  HEENT unremarkable  Lungs clear  Heart reveals a regular rate and rhythm  Abdomen obese  Extremities unremarkable  Diabetic foot exam:   Left Foot:   Visual Exam: normal   Pulse DP: 2+ (normal)   Filament test: normal sensation   Vibratory Sensation: normal  Right Foot:   Visual Exam: normal   Pulse DP: 2+ (normal)   Filament test: normal sensation   Vibratory Sensation: normal     Impression  1.  Type 2 diabetes mellitus with improved A1c

## 2024-07-08 ENCOUNTER — OFFICE VISIT (OUTPATIENT)
Age: 44
End: 2024-07-08
Payer: COMMERCIAL

## 2024-07-08 VITALS
DIASTOLIC BLOOD PRESSURE: 71 MMHG | SYSTOLIC BLOOD PRESSURE: 129 MMHG | HEIGHT: 63 IN | HEART RATE: 73 BPM | WEIGHT: 293 LBS | OXYGEN SATURATION: 100 % | RESPIRATION RATE: 16 BRPM | BODY MASS INDEX: 51.91 KG/M2

## 2024-07-08 DIAGNOSIS — E11.29 TYPE 2 DIABETES MELLITUS WITH OTHER DIABETIC KIDNEY COMPLICATION (HCC): Primary | ICD-10-CM

## 2024-07-08 LAB — HBA1C MFR BLD: 8.1 %

## 2024-07-08 PROCEDURE — 83036 HEMOGLOBIN GLYCOSYLATED A1C: CPT | Performed by: INTERNAL MEDICINE

## 2024-07-08 PROCEDURE — 3078F DIAST BP <80 MM HG: CPT | Performed by: INTERNAL MEDICINE

## 2024-07-08 PROCEDURE — 99214 OFFICE O/P EST MOD 30 MIN: CPT | Performed by: INTERNAL MEDICINE

## 2024-07-08 PROCEDURE — 3074F SYST BP LT 130 MM HG: CPT | Performed by: INTERNAL MEDICINE

## 2024-07-08 RX ORDER — DEXTROAMPHETAMINE SACCHARATE, AMPHETAMINE ASPARTATE MONOHYDRATE, DEXTROAMPHETAMINE SULFATE AND AMPHETAMINE SULFATE 6.25; 6.25; 6.25; 6.25 MG/1; MG/1; MG/1; MG/1
CAPSULE, EXTENDED RELEASE ORAL
COMMUNITY
Start: 2024-07-01

## 2024-07-08 RX ORDER — CHLORTHALIDONE 25 MG/1
TABLET ORAL
COMMUNITY
Start: 2024-04-15

## 2024-07-08 NOTE — PROGRESS NOTES
Identified pt with two pt identifiers(name and ). Reviewed record in preparation for visit and have obtained necessary documentation. All patient medications has been reviewed.  Chief Complaint   Patient presents with    Follow-up    Diabetes             Wt Readings from Last 3 Encounters:   24 133.4 kg (294 lb)   24 (!) 138.8 kg (306 lb)   11/10/23 (!) 136.1 kg (300 lb 1.6 oz)     Temp Readings from Last 3 Encounters:   No data found for Temp     BP Readings from Last 3 Encounters:   24 129/71   24 (!) 150/82   11/10/23 (!) 168/88     Pulse Readings from Last 3 Encounters:   24 73   24 97   11/10/23 95       \"Have you been to the ER, urgent care clinic since your last visit?  Hospitalized since your last visit?\"    NO    “Have you seen or consulted any other health care providers outside of [facility] since your last visit?”    NO    Click Here for Release of Records Request          
 I have strongly encouraged her to pursue bariatric surgery.  She is being followed by nephrology as well and I encouraged her to adhere to that regimen.  2.  I focused on using her Dexcom to keep her blood sugar in range.  She was cautioned that better blood sugar control is an important part of but not all of the strategies necessary to keep her from developing end-stage renal disease  3.  I will see her back in 3 months.    Please note that this dictation was completed with BLUEPHO[employer]IX, the computer voice recognition software.  Quite often unanticipated grammatical, syntax, homophones, and other interpretive errors are inadvertently transcribed by the computer software.  Please disregard these errors.  Please excuse any errors that have escaped final proofreading.

## 2024-11-08 ENCOUNTER — TELEPHONE (OUTPATIENT)
Age: 44
End: 2024-11-08

## 2024-11-08 NOTE — TELEPHONE ENCOUNTER
Patient has completed the new bariatric seminar. Call has been placed to patient informing them that the bariatric paperwork packet has been sent via email and to check junk/spam. Once paperwork is completed and sent back,we will schedule a consultation. No answer, left voicemail advising patient to return call.

## 2024-11-11 ENCOUNTER — OFFICE VISIT (OUTPATIENT)
Age: 44
End: 2024-11-11
Payer: COMMERCIAL

## 2024-11-11 VITALS
OXYGEN SATURATION: 97 % | HEART RATE: 97 BPM | BODY MASS INDEX: 51.91 KG/M2 | SYSTOLIC BLOOD PRESSURE: 131 MMHG | HEIGHT: 63 IN | DIASTOLIC BLOOD PRESSURE: 84 MMHG | WEIGHT: 293 LBS

## 2024-11-11 DIAGNOSIS — E11.29 TYPE 2 DIABETES MELLITUS WITH OTHER DIABETIC KIDNEY COMPLICATION (HCC): Primary | ICD-10-CM

## 2024-11-11 LAB — HBA1C MFR BLD: 8.5 %

## 2024-11-11 PROCEDURE — 83036 HEMOGLOBIN GLYCOSYLATED A1C: CPT | Performed by: INTERNAL MEDICINE

## 2024-11-11 PROCEDURE — 3075F SYST BP GE 130 - 139MM HG: CPT | Performed by: INTERNAL MEDICINE

## 2024-11-11 PROCEDURE — 3079F DIAST BP 80-89 MM HG: CPT | Performed by: INTERNAL MEDICINE

## 2024-11-11 PROCEDURE — 99214 OFFICE O/P EST MOD 30 MIN: CPT | Performed by: INTERNAL MEDICINE

## 2024-11-11 NOTE — PROGRESS NOTES
This is a 43-year-old woman with a history of type 2 diabetes mellitus.  She has had diabetes since around 2007 In 1/2019 C-peptide was 1.1 with glucose of 169.  Has been on GLP's in the past but has not tolerated them.       She developed symptoms of gallbladder disease and she underwent cholecystectomy in August.2022.  Since that time  she has had symptoms suggestive of gastroparesis.   She presented to [facility] with symptoms of gastroparesis following  a gastric emptying study.  On presentation 9/22/2022, she had a glucose of 379, creatinine 1.65, CO2 22, anion gap 8 , AST of 13, ALT 28, A1c 8.1%.  Urinalysis 4+ glucose and 2+ ketones with moderate blood. Treated with metoclopramide and Zofran.   At our last visit her A1c was  8.1%. Her A1c today is 8.5%.        Current Diabetes Medication   Tresiba insulin 100 units AM   Humalog 20 to 30 units of Humalog with meals.     Farxiga 10 mg      Patient tells me today that she has tried to be adherent to her insulin regimen but has not been the best. She saw her nephrologist recently and had an extensive workup. She is now on farxiga and finerenone is being added. Her GFR is less than 30.    Examination  Blood pressure 131/84  Pulse 97  Weight 138 kg  BMI 53.8  HEENT unremarkable  Lungs clear  Heart reveals a regular rate and rhythm  Abdomen benign  Extremities unremarkable      Impression  1.  Type 2 diabetes mellitus with poor blood sugar control, worse with greater nonadherence to her insulin regimen  2.  Morbid obesity  3.  Chronic kidney disease stage IIIb/IV    Plan:  1.  I have encouraged her to put alarms on her phone to remember to take her insulin.  She is missing both long-acting and short acting doses of insulin  2.  I have advised her to take her insulin prior to eating meals rather than after when the alarm goes off on the sensor  3.  I will see her in follow-up.  4.  We had another conversation about the possibility of bariatric

## 2024-11-11 NOTE — PROGRESS NOTES
Patient identified by name and date of birth  Belinda Chin is a 44 y.o. female   Chief Complaint   Patient presents with    Diabetes     Type 2 diabetes mellitus with other diabetic kidney complication (HCC)      Vitals:    11/11/24 1540   BP: 131/84   Site: Left Upper Arm   Pulse: 97   SpO2: 97%   Weight: (!) 138.2 kg (304 lb 9.6 oz)   Height: 1.6 m (5' 3\")       Patient's last menstrual period was 11/11/2024.         \"Have you been to the ER, urgent care clinic since your last visit?  Hospitalized since your last visit?\"    NO    “Have you seen or consulted any other health care providers outside of [facility] since your last visit?”    Yes seen by [facility].

## 2024-11-15 RX ORDER — LOSARTAN POTASSIUM 100 MG/1
100 TABLET ORAL DAILY
Qty: 90 TABLET | Refills: 3 | Status: SHIPPED | OUTPATIENT
Start: 2024-11-15

## 2024-11-15 RX ORDER — PANTOPRAZOLE SODIUM 40 MG/1
40 TABLET, DELAYED RELEASE ORAL DAILY
Qty: 90 TABLET | Refills: 3 | Status: SHIPPED | OUTPATIENT
Start: 2024-11-15

## 2024-11-21 DIAGNOSIS — E11.29 TYPE 2 DIABETES MELLITUS WITH OTHER DIABETIC KIDNEY COMPLICATION (HCC): ICD-10-CM

## 2024-11-22 RX ORDER — INSULIN LISPRO 200 [IU]/ML
30 INJECTION, SOLUTION SUBCUTANEOUS
Qty: 15 ML | Refills: 3 | Status: SHIPPED | OUTPATIENT
Start: 2024-11-22

## 2024-11-22 RX ORDER — ACYCLOVIR 400 MG/1
TABLET ORAL
Qty: 9 EACH | Refills: 4 | Status: SHIPPED | OUTPATIENT
Start: 2024-11-22

## 2024-12-04 DIAGNOSIS — E11.29 TYPE 2 DIABETES MELLITUS WITH OTHER DIABETIC KIDNEY COMPLICATION (HCC): Primary | ICD-10-CM

## 2024-12-04 RX ORDER — INSULIN DEGLUDEC 100 U/ML
50 INJECTION, SOLUTION SUBCUTANEOUS 2 TIMES DAILY
Qty: 45 ML | Refills: 5 | Status: SHIPPED | OUTPATIENT
Start: 2024-12-04

## 2025-02-19 ENCOUNTER — TELEMEDICINE (OUTPATIENT)
Age: 45
End: 2025-02-19
Payer: COMMERCIAL

## 2025-02-19 DIAGNOSIS — E11.22 TYPE 2 DIABETES MELLITUS WITH STAGE 3B CHRONIC KIDNEY DISEASE, WITH LONG-TERM CURRENT USE OF INSULIN (HCC): Primary | ICD-10-CM

## 2025-02-19 DIAGNOSIS — Z79.4 TYPE 2 DIABETES MELLITUS WITH STAGE 3B CHRONIC KIDNEY DISEASE, WITH LONG-TERM CURRENT USE OF INSULIN (HCC): Primary | ICD-10-CM

## 2025-02-19 DIAGNOSIS — N18.32 TYPE 2 DIABETES MELLITUS WITH STAGE 3B CHRONIC KIDNEY DISEASE, WITH LONG-TERM CURRENT USE OF INSULIN (HCC): Primary | ICD-10-CM

## 2025-02-19 PROCEDURE — 95251 CONT GLUC MNTR ANALYSIS I&R: CPT | Performed by: INTERNAL MEDICINE

## 2025-02-19 PROCEDURE — 99214 OFFICE O/P EST MOD 30 MIN: CPT | Performed by: INTERNAL MEDICINE

## 2025-02-19 RX ORDER — FINERENONE 10 MG/1
1 TABLET, FILM COATED ORAL 2 TIMES DAILY
COMMUNITY
Start: 2025-02-11

## 2025-02-19 NOTE — PROGRESS NOTES
Belinda Chin, was evaluated through a synchronous (real-time) audio-video encounter. The patient (or guardian if applicable) is aware that this is a billable service, which includes applicable co-pays. This Virtual Visit was conducted with patient's (and/or legal guardian's) consent. Patient identification was verified, and a caregiver was present when appropriate.   The patient was located at Home: [address] [address]  Provider was located at Facility (Appt Dept): [address]  [place] [address]  Confirm you are appropriately licensed, registered, or certified to deliver care in the state where the patient is located as indicated above. If you are not or unsure, please re-schedule the visit: Yes, I confirm.      Total time spent for this encounter:  20    --Ricardo Daugherty MD on 2/19/2025 at 9:00 AM    An electronic signature was used to authenticate this note.     This is a 43-year-old woman with a history of type 2 diabetes mellitus.  She has had diabetes since around 2007 In 1/2019 C-peptide was 1.1 with glucose of 169.  Has been on GLP's in the past but has not tolerated them.       She developed symptoms of gallbladder disease and she underwent cholecystectomy in August.2022.  Since that time  she has had symptoms suggestive of gastroparesis.   She presented to [facility] with symptoms of gastroparesis following  a gastric emptying study.  On presentation 9/22/2022, she had a glucose of 379, creatinine 1.65, CO2 22, anion gap 8 , AST of 13, ALT 28, A1c 8.1%.  Urinalysis 4+ glucose and 2+ ketones with moderate blood. Treated with metoclopramide and Zofran.       She is followed by nephrology for her chronic kidney disease.  She is now on farxiga and finerenone Her GFR is 36.    Regarding her diabetes, she has been reluctant to consistently take her insulin.  We put alarms on her phone and she appears to be doing much better now.  She claims today

## 2025-06-30 ENCOUNTER — OFFICE VISIT (OUTPATIENT)
Age: 45
End: 2025-06-30
Payer: COMMERCIAL

## 2025-06-30 VITALS
HEART RATE: 83 BPM | SYSTOLIC BLOOD PRESSURE: 128 MMHG | HEIGHT: 63 IN | OXYGEN SATURATION: 98 % | WEIGHT: 293 LBS | DIASTOLIC BLOOD PRESSURE: 79 MMHG | RESPIRATION RATE: 18 BRPM | BODY MASS INDEX: 51.91 KG/M2 | TEMPERATURE: 98 F

## 2025-06-30 DIAGNOSIS — Z79.4 LONG TERM (CURRENT) USE OF INSULIN (HCC): ICD-10-CM

## 2025-06-30 DIAGNOSIS — E11.29 TYPE 2 DIABETES MELLITUS WITH OTHER DIABETIC KIDNEY COMPLICATION (HCC): ICD-10-CM

## 2025-06-30 DIAGNOSIS — Z79.4 TYPE 2 DIABETES MELLITUS WITH STAGE 3B CHRONIC KIDNEY DISEASE, WITH LONG-TERM CURRENT USE OF INSULIN (HCC): Primary | ICD-10-CM

## 2025-06-30 DIAGNOSIS — N18.32 TYPE 2 DIABETES MELLITUS WITH STAGE 3B CHRONIC KIDNEY DISEASE, WITH LONG-TERM CURRENT USE OF INSULIN (HCC): Primary | ICD-10-CM

## 2025-06-30 DIAGNOSIS — E11.22 TYPE 2 DIABETES MELLITUS WITH STAGE 3B CHRONIC KIDNEY DISEASE, WITH LONG-TERM CURRENT USE OF INSULIN (HCC): Primary | ICD-10-CM

## 2025-06-30 LAB — HBA1C MFR BLD: 7.2 %

## 2025-06-30 PROCEDURE — 3051F HG A1C>EQUAL 7.0%<8.0%: CPT | Performed by: INTERNAL MEDICINE

## 2025-06-30 PROCEDURE — 3078F DIAST BP <80 MM HG: CPT | Performed by: INTERNAL MEDICINE

## 2025-06-30 PROCEDURE — 99214 OFFICE O/P EST MOD 30 MIN: CPT | Performed by: INTERNAL MEDICINE

## 2025-06-30 PROCEDURE — 3074F SYST BP LT 130 MM HG: CPT | Performed by: INTERNAL MEDICINE

## 2025-06-30 PROCEDURE — 83036 HEMOGLOBIN GLYCOSYLATED A1C: CPT | Performed by: INTERNAL MEDICINE

## 2025-06-30 PROCEDURE — 95251 CONT GLUC MNTR ANALYSIS I&R: CPT | Performed by: INTERNAL MEDICINE

## 2025-06-30 RX ORDER — INSULIN DEGLUDEC 200 U/ML
100 INJECTION, SOLUTION SUBCUTANEOUS DAILY
Qty: 45 ML | Refills: 3 | Status: SHIPPED | OUTPATIENT
Start: 2025-06-30

## 2025-06-30 RX ORDER — INSULIN LISPRO 200 [IU]/ML
30 INJECTION, SOLUTION SUBCUTANEOUS
Qty: 90 ML | Refills: 3 | Status: SHIPPED | OUTPATIENT
Start: 2025-06-30

## 2025-06-30 NOTE — PROGRESS NOTES
Chief Complaint   Patient presents with    Follow-up    Discuss Medications     \"Have you been to the ER, urgent care clinic since your last visit?  Hospitalized since your last visit?\"    YES - When: approximately 3 months ago.  Where and Why: [facility].    “Have you seen or consulted any other health care providers outside of [facility] since your last visit?”    NO

## 2025-06-30 NOTE — PROGRESS NOTES
This is a 45-year-old woman with a history of type 2 diabetes mellitus.  She has had diabetes since around 2007 In 1/2019 C-peptide was 1.1 with glucose of 169.  Has been on GLP's in the past but has not tolerated them.       She developed symptoms of gallbladder disease and she underwent cholecystectomy in August.2022.  Since that time  she has had symptoms suggestive of gastroparesis.   She presented to [facility] with symptoms of gastroparesis following  a gastric emptying study.  On presentation 9/22/2022, she had a glucose of 379, creatinine 1.65, CO2 22, anion gap 8 , AST of 13, ALT 28, A1c 8.1%.  Urinalysis 4+ glucose and 2+ ketones with moderate blood. Treated with metoclopramide and Zofran.        She is followed by nephrology for her chronic kidney disease.  She is now on farxiga and finerenone Her GFR is 36.     The patient has been much better and taking her insulin.  She is taking 100 units of Tresiba once every day and Humalog 20 to 30 units with her meals and her A1c is improving.  .  At our last visit her A1c was  7.9%. Her A1c today is 7.2%.        Current Diabetes Medication   Tresiba insulin 100 units AM   Humalog 20 to 30 units of Humalog with meals.     Farxiga 10 mg    She tells me today that she stopped her Adderall and promptly gained 25 pounds.  She lost her job for a while and so she did not have a sensors but has that back with a new job.  She usually does not eat breakfast.  Lunch is a sandwich with an apple and granola bar.  Dinner is chicken with 2 vegetables and a carb.  Last night she had chicken corn green beans and macaroni and cheese.  Another meal is spaghetti.  She occasionally has ice cream at bedtime.  Her  is on Ozempic for his diabetes.    Examination  Blood pressure 128/79  Pulse 83  Weight 147 kg  BMI 57.5  HEENT unremarkable  Lungs clear  Heart reveals a regular rate and rhythm  Abdomen obese  Extremities unremarkable  Diabetic foot exam:

## 2025-07-01 RX ORDER — INSULIN LISPRO 200 [IU]/ML
INJECTION, SOLUTION SUBCUTANEOUS
Qty: 45 ML | Refills: 3 | Status: SHIPPED | OUTPATIENT
Start: 2025-07-01

## 2025-08-17 DIAGNOSIS — Z79.4 LONG TERM (CURRENT) USE OF INSULIN (HCC): ICD-10-CM

## 2025-08-17 DIAGNOSIS — Z79.4 TYPE 2 DIABETES MELLITUS WITH STAGE 3B CHRONIC KIDNEY DISEASE, WITH LONG-TERM CURRENT USE OF INSULIN (HCC): Primary | ICD-10-CM

## 2025-08-17 DIAGNOSIS — E11.22 TYPE 2 DIABETES MELLITUS WITH STAGE 3B CHRONIC KIDNEY DISEASE, WITH LONG-TERM CURRENT USE OF INSULIN (HCC): Primary | ICD-10-CM

## 2025-08-17 DIAGNOSIS — N18.32 TYPE 2 DIABETES MELLITUS WITH STAGE 3B CHRONIC KIDNEY DISEASE, WITH LONG-TERM CURRENT USE OF INSULIN (HCC): Primary | ICD-10-CM

## (undated) DEVICE — TRAP,MUCUS SPECIMEN, 80CC: Brand: MEDLINE

## (undated) DEVICE — Device

## (undated) DEVICE — CATH IV AUTOGRD BC PNK 20GA 25 -- INSYTE

## (undated) DEVICE — HYPODERMIC SAFETY NEEDLE: Brand: MAGELLAN

## (undated) DEVICE — KIT,1200CC CANISTER,3/16"X6' TUBING: Brand: MEDLINE INDUSTRIES, INC.

## (undated) DEVICE — SET ADMIN 16ML TBNG L100IN 2 Y INJ SITE IV PIGGY BK DISP (ORDER IN MULIPLES OF 48)

## (undated) DEVICE — Z DISCONTINUED NO SUB IDED SET EXTN W/ 4 W STPCOCK M SPIN LOK 36IN

## (undated) DEVICE — C-ARM: Brand: UNBRANDED

## (undated) DEVICE — SUTURE MCRYL SZ 4-0 L27IN ABSRB UD L19MM PS-2 1/2 CIR PRIM Y426H

## (undated) DEVICE — STRAINER URIN CALC RNL MSH -- CONVERT TO ITEM 357634

## (undated) DEVICE — LAPAROSCOPIC TROCAR SLEEVE/SINGLE USE: Brand: KII® OPTICAL ACCESS SYSTEM

## (undated) DEVICE — GENERAL LAPAROSCOPY-MRMC: Brand: MEDLINE INDUSTRIES, INC.

## (undated) DEVICE — TROCAR LAP L100MM DIA5MM BLDELSS W/ STBL SL ENDOPATH XCEL

## (undated) DEVICE — TUBING, SUCTION, 1/4" X 10', STRAIGHT: Brand: MEDLINE

## (undated) DEVICE — CATHETER CHOLGM 4.5FR L18IN W/ MTL SUPP TB

## (undated) DEVICE — CONTAINER SPEC 20 ML LID NEUT BUFF FORMALIN 10 % POLYPR STS

## (undated) DEVICE — ELECTRODE ES SHFT L29CM HK OD5MM ENDOPATH PRB + II

## (undated) DEVICE — APPLIER CLP M/L SHFT DIA5MM 15 LIG LIGAMAX 5

## (undated) DEVICE — CATHETER IV 14GA L2IN POLYUR STR ORNG HUB SFTY RADPQ DISP

## (undated) DEVICE — NON-REM POLYHESIVE PATIENT RETURN ELECTRODE: Brand: VALLEYLAB

## (undated) DEVICE — DECANTER BAG 9": Brand: MEDLINE INDUSTRIES, INC.

## (undated) DEVICE — TROCAR: Brand: KII® SLEEVE

## (undated) DEVICE — GLOVE SURG SZ 65 THK91MIL LTX FREE SYN POLYISOPRENE

## (undated) DEVICE — BASIN EMSIS 16OZ GRAPHITE PLAS KID SHP MOLD GRAD FOR ORAL

## (undated) DEVICE — ELECTRODE,RADIOTRANSLUCENT,FOAM,5PK: Brand: MEDLINE

## (undated) DEVICE — VISUALIZATION SYSTEM: Brand: CLEARIFY

## (undated) DEVICE — Z DISCONTINUED PER MEDLINE LINE GAS SAMPLING O2/CO2 LNG AD 13 FT NSL W/ TBNG FILTERLINE

## (undated) DEVICE — DERMABOND SKIN ADH 0.7ML -- DERMABOND ADVANCED 12/BX

## (undated) DEVICE — TOWEL 4 PLY TISS 19X30 SUE WHT

## (undated) DEVICE — SOL INJ SOD CL 0.9% 500ML BG --

## (undated) DEVICE — 1200 GUARD II KIT W/5MM TUBE W/O VAC TUBE: Brand: GUARDIAN

## (undated) DEVICE — SYR 3ML LL TIP 1/10ML GRAD --

## (undated) DEVICE — SYR 10ML LUER LOK 1/5ML GRAD --

## (undated) DEVICE — ACUSNARE POLYPECTOMY SNARE: Brand: ACUSNARE

## (undated) DEVICE — 1000ML,PRESSURE INFUSER W/STOPCOCK: Brand: MEDLINE

## (undated) DEVICE — SUTURE SZ 0 27IN 5/8 CIR UR-6  TAPER PT VIOLET ABSRB VICRYL J603H